# Patient Record
Sex: FEMALE | Race: WHITE | NOT HISPANIC OR LATINO | Employment: OTHER | URBAN - NONMETROPOLITAN AREA
[De-identification: names, ages, dates, MRNs, and addresses within clinical notes are randomized per-mention and may not be internally consistent; named-entity substitution may affect disease eponyms.]

---

## 2017-11-13 ENCOUNTER — OFFICE VISIT (OUTPATIENT)
Dept: FAMILY MEDICINE CLINIC | Facility: CLINIC | Age: 72
End: 2017-11-13

## 2017-11-13 VITALS
WEIGHT: 197 LBS | BODY MASS INDEX: 31.66 KG/M2 | RESPIRATION RATE: 16 BRPM | TEMPERATURE: 97.8 F | SYSTOLIC BLOOD PRESSURE: 118 MMHG | DIASTOLIC BLOOD PRESSURE: 57 MMHG | HEIGHT: 66 IN | HEART RATE: 71 BPM | OXYGEN SATURATION: 97 %

## 2017-11-13 DIAGNOSIS — M79.10 MYALGIA: ICD-10-CM

## 2017-11-13 DIAGNOSIS — G89.29 CHRONIC PAIN OF BOTH KNEES: ICD-10-CM

## 2017-11-13 DIAGNOSIS — M25.562 CHRONIC PAIN OF BOTH KNEES: ICD-10-CM

## 2017-11-13 DIAGNOSIS — F51.01 PRIMARY INSOMNIA: ICD-10-CM

## 2017-11-13 DIAGNOSIS — Z11.59 ENCOUNTER FOR HEPATITIS C SCREENING TEST FOR LOW RISK PATIENT: ICD-10-CM

## 2017-11-13 DIAGNOSIS — M54.42 CHRONIC BILATERAL LOW BACK PAIN WITH LEFT-SIDED SCIATICA: ICD-10-CM

## 2017-11-13 DIAGNOSIS — N39.0 URINARY TRACT INFECTION WITHOUT HEMATURIA, SITE UNSPECIFIED: ICD-10-CM

## 2017-11-13 DIAGNOSIS — M25.561 CHRONIC PAIN OF BOTH KNEES: ICD-10-CM

## 2017-11-13 DIAGNOSIS — R53.83 FATIGUE, UNSPECIFIED TYPE: ICD-10-CM

## 2017-11-13 DIAGNOSIS — G47.33 OSA (OBSTRUCTIVE SLEEP APNEA): Primary | ICD-10-CM

## 2017-11-13 DIAGNOSIS — L40.9 PSORIASIS: ICD-10-CM

## 2017-11-13 DIAGNOSIS — G89.29 CHRONIC BILATERAL LOW BACK PAIN WITH LEFT-SIDED SCIATICA: ICD-10-CM

## 2017-11-13 DIAGNOSIS — M19.90 ARTHRITIS: ICD-10-CM

## 2017-11-13 DIAGNOSIS — I10 ESSENTIAL HYPERTENSION: ICD-10-CM

## 2017-11-13 DIAGNOSIS — J43.8 OTHER EMPHYSEMA (HCC): ICD-10-CM

## 2017-11-13 DIAGNOSIS — E11.9 DIABETES MELLITUS WITHOUT COMPLICATION (HCC): ICD-10-CM

## 2017-11-13 PROCEDURE — 99204 OFFICE O/P NEW MOD 45 MIN: CPT | Performed by: INTERNAL MEDICINE

## 2017-11-13 RX ORDER — HYDROCODONE BITARTRATE AND ACETAMINOPHEN 10; 325 MG/1; MG/1
1 TABLET ORAL EVERY 6 HOURS PRN
COMMUNITY
End: 2017-11-13

## 2017-11-13 RX ORDER — ISOSORBIDE MONONITRATE 30 MG/1
30 TABLET, EXTENDED RELEASE ORAL DAILY
COMMUNITY
End: 2017-11-13 | Stop reason: SDUPTHER

## 2017-11-13 RX ORDER — VENLAFAXINE HYDROCHLORIDE 150 MG/1
150 CAPSULE, EXTENDED RELEASE ORAL 2 TIMES DAILY
COMMUNITY
End: 2017-11-13 | Stop reason: SDUPTHER

## 2017-11-13 RX ORDER — ISOSORBIDE MONONITRATE 30 MG/1
30 TABLET, EXTENDED RELEASE ORAL DAILY
Qty: 90 TABLET | Refills: 1 | Status: SHIPPED | OUTPATIENT
Start: 2017-11-13 | End: 2019-07-01

## 2017-11-13 RX ORDER — FENOFIBRATE 160 MG/1
160 TABLET ORAL DAILY
Qty: 90 TABLET | Refills: 1 | Status: SHIPPED | OUTPATIENT
Start: 2017-11-13 | End: 2018-07-30

## 2017-11-13 RX ORDER — METHOCARBAMOL 500 MG/1
1000 TABLET, FILM COATED ORAL 2 TIMES DAILY
COMMUNITY

## 2017-11-13 RX ORDER — RANITIDINE 150 MG/1
150 TABLET ORAL NIGHTLY
COMMUNITY
End: 2017-11-13 | Stop reason: SDUPTHER

## 2017-11-13 RX ORDER — METOPROLOL TARTRATE 50 MG/1
50 TABLET, FILM COATED ORAL 2 TIMES DAILY
Qty: 180 TABLET | Refills: 3 | Status: SHIPPED | OUTPATIENT
Start: 2017-11-13 | End: 2021-12-10 | Stop reason: DRUGHIGH

## 2017-11-13 RX ORDER — AMLODIPINE BESYLATE 10 MG/1
10 TABLET ORAL DAILY
Qty: 90 TABLET | Refills: 1 | Status: SHIPPED | OUTPATIENT
Start: 2017-11-13 | End: 2018-07-30

## 2017-11-13 RX ORDER — QUINAPRIL 40 MG/1
40 TABLET ORAL 2 TIMES DAILY
Qty: 180 TABLET | Refills: 1 | Status: SHIPPED | OUTPATIENT
Start: 2017-11-13 | End: 2018-07-30 | Stop reason: SINTOL

## 2017-11-13 RX ORDER — NITROFURANTOIN MACROCRYSTALS 100 MG/1
100 CAPSULE ORAL DAILY
COMMUNITY
End: 2017-11-13 | Stop reason: SDUPTHER

## 2017-11-13 RX ORDER — HYDROCODONE BITARTRATE AND ACETAMINOPHEN 5; 325 MG/1; MG/1
1 TABLET ORAL EVERY 6 HOURS PRN
COMMUNITY
End: 2017-11-13

## 2017-11-13 RX ORDER — LORAZEPAM 1 MG/1
2 TABLET ORAL 2 TIMES DAILY
COMMUNITY
End: 2020-12-23

## 2017-11-13 RX ORDER — FENOFIBRATE 160 MG/1
160 TABLET ORAL 4 TIMES DAILY
COMMUNITY
End: 2017-11-13 | Stop reason: SDUPTHER

## 2017-11-13 RX ORDER — NITROFURANTOIN MACROCRYSTALS 100 MG/1
100 CAPSULE ORAL DAILY
Qty: 30 CAPSULE | Refills: 5 | Status: SHIPPED | OUTPATIENT
Start: 2017-11-13 | End: 2020-12-24

## 2017-11-13 RX ORDER — METOPROLOL TARTRATE 50 MG/1
50 TABLET, FILM COATED ORAL 2 TIMES DAILY
COMMUNITY
End: 2017-11-13 | Stop reason: SDUPTHER

## 2017-11-13 RX ORDER — RANITIDINE 150 MG/1
150 TABLET ORAL 2 TIMES DAILY
Qty: 180 TABLET | Refills: 3 | Status: SHIPPED | OUTPATIENT
Start: 2017-11-13 | End: 2019-07-01

## 2017-11-13 RX ORDER — VENLAFAXINE HYDROCHLORIDE 150 MG/1
150 CAPSULE, EXTENDED RELEASE ORAL 2 TIMES DAILY
Qty: 180 CAPSULE | Refills: 3 | Status: SHIPPED | OUTPATIENT
Start: 2017-11-13 | End: 2018-12-20 | Stop reason: SDUPTHER

## 2017-11-13 RX ORDER — QUINAPRIL 40 MG/1
40 TABLET ORAL 2 TIMES DAILY
Qty: 60 TABLET | Refills: 11 | Status: SHIPPED | OUTPATIENT
Start: 2017-11-13 | End: 2017-11-13 | Stop reason: SDUPTHER

## 2017-11-13 RX ORDER — ZOLPIDEM TARTRATE 10 MG/1
10 TABLET ORAL NIGHTLY PRN
COMMUNITY
End: 2017-11-13

## 2017-11-13 RX ORDER — QUINAPRIL 40 MG/1
40 TABLET ORAL 2 TIMES DAILY
COMMUNITY
End: 2017-11-13 | Stop reason: SDUPTHER

## 2017-11-13 RX ORDER — CYCLOBENZAPRINE HCL 10 MG
10 TABLET ORAL 2 TIMES DAILY
COMMUNITY
End: 2019-07-01 | Stop reason: ALTCHOICE

## 2017-11-13 NOTE — PROGRESS NOTES
Subjective     Patient ID: Katharine Fitzpatrick is a 72 y.o. female. Patient is here for management of multiple medical problems.     Chief Complaint   Patient presents with   • Arthritis     Initial visit to establish care, patient states she is here for joint and back pain and headaches     History of Present Illness       meve her to be with family 1 month ago.. Pt was in [place].      C/o arthritis in knees and back.   Pt has bee on motrin and Celebrex.   Pt uses hydrocodone prn.    Pt not drinking water regularly.    \    Pt with poor sleep.  Pt sates she is no longer on Ambien.  Pt tried all the time. Wakes tired.           Smokes 1/2 ppd or less. Smoked 50 + year            The following portions of the patient's history were reviewed and updated as appropriate: allergies, current medications, past family history, past medical history, past social history, past surgical history and problem list.    Review of Systems   Constitutional: Positive for fatigue.   Respiratory: Negative for cough and shortness of breath.    Psychiatric/Behavioral: Positive for sleep disturbance. Negative for dysphoric mood.   All other systems reviewed and are negative.      Current Outpatient Prescriptions:   •  adalimumab (HUMIRA) 40 MG/0.8ML Prefilled Syringe Kit injection, Inject 40 mg under the skin 1 (One) Time., Disp: , Rfl:   •  amLODIPine (NORVASC) 10 MG tablet, Take 1 tablet by mouth Daily., Disp: 90 tablet, Rfl: 1  •  cyclobenzaprine (FLEXERIL) 10 MG tablet, Take 10 mg by mouth 2 (Two) Times a Day., Disp: , Rfl:   •  fenofibrate 160 MG tablet, Take 1 tablet by mouth Daily., Disp: 90 tablet, Rfl: 1  •  fluticasone-salmeterol (ADVAIR DISKUS) 250-50 MCG/DOSE DISKUS, Inhale 1 puff 2 (Two) Times a Day., Disp: 60 each, Rfl: 5  •  insulin aspart (novoLOG FLEXPEN) 100 UNIT/ML solution pen-injector sc pen, Inject  under the skin 3 (Three) Times a Day With Meals. Patient taking 25 units as needed, Disp: , Rfl:   •  Insulin Glargine (LANTUS  "SOLOSTAR) 100 UNIT/ML injection pen, Inject 38 Units under the skin 2 (Two) Times a Day., Disp: , Rfl:   •  isosorbide mononitrate (IMDUR) 30 MG 24 hr tablet, Take 1 tablet by mouth Daily., Disp: 90 tablet, Rfl: 1  •  LORazepam (ATIVAN) 1 MG tablet, Take 2 mg by mouth 2 (Two) Times a Day. Patient takes 2 tablets 2 times a day., Disp: , Rfl:   •  methocarbamol (ROBAXIN) 500 MG tablet, Take 1,000 mg by mouth 2 (Two) Times a Day. Patient takes 2 tablets 2 times a day., Disp: , Rfl:   •  metoprolol tartrate (LOPRESSOR) 50 MG tablet, Take 1 tablet by mouth 2 (Two) Times a Day., Disp: 180 tablet, Rfl: 3  •  nitrofurantoin (MACRODANTIN) 100 MG capsule, Take 1 capsule by mouth Daily., Disp: 30 capsule, Rfl: 5  •  quinapril (ACCUPRIL) 40 MG tablet, Take 1 tablet by mouth 2 (Two) Times a Day., Disp: 60 tablet, Rfl: 11  •  raNITIdine (ZANTAC) 150 MG tablet, Take 1 tablet by mouth 2 (Two) Times a Day., Disp: 180 tablet, Rfl: 3  •  venlafaxine XR (EFFEXOR-XR) 150 MG 24 hr capsule, Take 1 capsule by mouth 2 (Two) Times a Day., Disp: 180 capsule, Rfl: 3  •  Tdap (BOOSTRIX) 5-2.5-18.5 LF-MCG/0.5 injection, Inject 0.5 mL into the shoulder, thigh, or buttocks 1 (One) Time for 1 dose., Disp: 0.5 mL, Rfl: 0    Current Facility-Administered Medications:   •  pneumococcal polysaccharide 23-valent (PNEUMOVAX-23) vaccine 0.5 mL, 0.5 mL, Intramuscular, Once, Adam Glasgow MD    Objective      Blood pressure 118/57, pulse 71, temperature 97.8 °F (36.6 °C), temperature source Temporal Artery , resp. rate 16, height 66\" (167.6 cm), weight 197 lb (89.4 kg), SpO2 97 %.    Physical Exam     General Appearance:    Alert, cooperative, no distress, appears stated age   Head:    Normocephalic, without obvious abnormality, atraumatic   Eyes:    PERRL, conjunctiva/corneas clear, EOM's intact   Ears:    Normal TM's and external ear canals, both ears   Nose:   Nares normal, septum midline, mucosa normal, no drainage   or sinus tenderness   Throat:   " Dry tacky mm.    Neck:   Supple, symmetrical, trachea midline, no adenopathy;        thyroid:  No enlargement/tenderness/nodules; no carotid    bruit or JVD   Back:     Symmetric, no curvature, ROM normal, no CVA tenderness   Lungs:     Wheezing and prolonged bs to auscultation bilaterally, respirations unlabored   Chest wall:    No tenderness or deformity   Heart:    Regular rate and rhythm, S1 and S2 normal, no murmur,        rub or gallop   Abdomen:     Soft, non-tender, bowel sounds active all four quadrants,     no masses, no organomegaly   Extremities:   Extremities normal, atraumatic, no cyanosis or edema   Pulses:   2+ and symmetric all extremities   Skin:   Skin color, texture, turgor normal, no rashes or lesions   Lymph nodes:   Cervical, supraclavicular, and axillary nodes normal   Neurologic:   CNII-XII intact. Normal strength, sensation and reflexes       throughout      No results found for this or any previous visit.      Assessment/Plan   Pt needs to drink water and stop soda and juice.    Pt declined chantix.      Katharine was seen today for arthritis.    Diagnoses and all orders for this visit:    VERN (obstructive sleep apnea)  -     Ambulatory Referral to Rheumatology  -     Tdap (BOOSTRIX) 5-2.5-18.5 LF-MCG/0.5 injection; Inject 0.5 mL into the shoulder, thigh, or buttocks 1 (One) Time for 1 dose.  -     pneumococcal polysaccharide 23-valent (PNEUMOVAX-23) vaccine 0.5 mL; Inject 0.5 mL into the shoulder, thigh, or buttocks 1 (One) Time.  -     Hepatitis panel, acute; Future  -     Hemoglobin A1c  -     Lipid Panel  -     MicroAlbumin, Urine, Random - Urine, Clean Catch  -     Urinalysis With Microscopic - Urine, Clean Catch  -     Urine Culture - Urine, Urine, Clean Catch  -     CBC & Differential  -     Comprehensive Metabolic Panel  -     TSH  -     T4, Free  -     Vitamin B12  -     Ambulatory Referral to Neurology    Primary insomnia  -     Ambulatory Referral to Rheumatology  -     Tdap  (BOOSTRIX) 5-2.5-18.5 LF-MCG/0.5 injection; Inject 0.5 mL into the shoulder, thigh, or buttocks 1 (One) Time for 1 dose.  -     pneumococcal polysaccharide 23-valent (PNEUMOVAX-23) vaccine 0.5 mL; Inject 0.5 mL into the shoulder, thigh, or buttocks 1 (One) Time.  -     Hepatitis panel, acute; Future  -     Hemoglobin A1c  -     Lipid Panel  -     MicroAlbumin, Urine, Random - Urine, Clean Catch  -     Urinalysis With Microscopic - Urine, Clean Catch  -     Urine Culture - Urine, Urine, Clean Catch  -     CBC & Differential  -     Comprehensive Metabolic Panel  -     TSH  -     T4, Free  -     Vitamin B12    Psoriasis  -     Ambulatory Referral to Rheumatology  -     Tdap (BOOSTRIX) 5-2.5-18.5 LF-MCG/0.5 injection; Inject 0.5 mL into the shoulder, thigh, or buttocks 1 (One) Time for 1 dose.  -     pneumococcal polysaccharide 23-valent (PNEUMOVAX-23) vaccine 0.5 mL; Inject 0.5 mL into the shoulder, thigh, or buttocks 1 (One) Time.  -     Hepatitis panel, acute; Future  -     Hemoglobin A1c  -     Lipid Panel  -     MicroAlbumin, Urine, Random - Urine, Clean Catch  -     Urinalysis With Microscopic - Urine, Clean Catch  -     Urine Culture - Urine, Urine, Clean Catch  -     CBC & Differential  -     Comprehensive Metabolic Panel  -     TSH  -     T4, Free  -     Vitamin B12    Chronic bilateral low back pain with left-sided sciatica  -     Ambulatory Referral to Rheumatology  -     Tdap (BOOSTRIX) 5-2.5-18.5 LF-MCG/0.5 injection; Inject 0.5 mL into the shoulder, thigh, or buttocks 1 (One) Time for 1 dose.  -     pneumococcal polysaccharide 23-valent (PNEUMOVAX-23) vaccine 0.5 mL; Inject 0.5 mL into the shoulder, thigh, or buttocks 1 (One) Time.  -     Hepatitis panel, acute; Future  -     Hemoglobin A1c  -     Lipid Panel  -     MicroAlbumin, Urine, Random - Urine, Clean Catch  -     Urinalysis With Microscopic - Urine, Clean Catch  -     Urine Culture - Urine, Urine, Clean Catch  -     CBC & Differential  -      Comprehensive Metabolic Panel  -     TSH  -     T4, Free  -     Vitamin B12    Essential hypertension  -     Ambulatory Referral to Rheumatology  -     Tdap (BOOSTRIX) 5-2.5-18.5 LF-MCG/0.5 injection; Inject 0.5 mL into the shoulder, thigh, or buttocks 1 (One) Time for 1 dose.  -     pneumococcal polysaccharide 23-valent (PNEUMOVAX-23) vaccine 0.5 mL; Inject 0.5 mL into the shoulder, thigh, or buttocks 1 (One) Time.  -     Hepatitis panel, acute; Future  -     Hemoglobin A1c  -     Lipid Panel  -     MicroAlbumin, Urine, Random - Urine, Clean Catch  -     Urinalysis With Microscopic - Urine, Clean Catch  -     Urine Culture - Urine, Urine, Clean Catch  -     CBC & Differential  -     Comprehensive Metabolic Panel  -     TSH  -     T4, Free  -     Vitamin B12    Chronic pain of both knees  -     Ambulatory Referral to Rheumatology  -     Tdap (BOOSTRIX) 5-2.5-18.5 LF-MCG/0.5 injection; Inject 0.5 mL into the shoulder, thigh, or buttocks 1 (One) Time for 1 dose.  -     pneumococcal polysaccharide 23-valent (PNEUMOVAX-23) vaccine 0.5 mL; Inject 0.5 mL into the shoulder, thigh, or buttocks 1 (One) Time.  -     Hepatitis panel, acute; Future  -     Hemoglobin A1c  -     Lipid Panel  -     MicroAlbumin, Urine, Random - Urine, Clean Catch  -     Urinalysis With Microscopic - Urine, Clean Catch  -     Urine Culture - Urine, Urine, Clean Catch  -     CBC & Differential  -     Comprehensive Metabolic Panel  -     TSH  -     T4, Free  -     Vitamin B12    Other emphysema  -     Ambulatory Referral to Rheumatology  -     Tdap (BOOSTRIX) 5-2.5-18.5 LF-MCG/0.5 injection; Inject 0.5 mL into the shoulder, thigh, or buttocks 1 (One) Time for 1 dose.  -     pneumococcal polysaccharide 23-valent (PNEUMOVAX-23) vaccine 0.5 mL; Inject 0.5 mL into the shoulder, thigh, or buttocks 1 (One) Time.  -     Hepatitis panel, acute; Future  -     Hemoglobin A1c  -     Lipid Panel  -     MicroAlbumin, Urine, Random - Urine, Clean Catch  -      Urinalysis With Microscopic - Urine, Clean Catch  -     Urine Culture - Urine, Urine, Clean Catch  -     CBC & Differential  -     Comprehensive Metabolic Panel  -     TSH  -     T4, Free  -     Vitamin B12    Arthritis  -     Ambulatory Referral to Rheumatology  -     Tdap (BOOSTRIX) 5-2.5-18.5 LF-MCG/0.5 injection; Inject 0.5 mL into the shoulder, thigh, or buttocks 1 (One) Time for 1 dose.  -     pneumococcal polysaccharide 23-valent (PNEUMOVAX-23) vaccine 0.5 mL; Inject 0.5 mL into the shoulder, thigh, or buttocks 1 (One) Time.  -     Hepatitis panel, acute; Future  -     Hemoglobin A1c  -     Lipid Panel  -     MicroAlbumin, Urine, Random - Urine, Clean Catch  -     Urinalysis With Microscopic - Urine, Clean Catch  -     Urine Culture - Urine, Urine, Clean Catch  -     CBC & Differential  -     Comprehensive Metabolic Panel  -     TSH  -     T4, Free  -     Vitamin B12    Urinary tract infection without hematuria, site unspecified  -     Ambulatory Referral to Rheumatology  -     Tdap (BOOSTRIX) 5-2.5-18.5 LF-MCG/0.5 injection; Inject 0.5 mL into the shoulder, thigh, or buttocks 1 (One) Time for 1 dose.  -     pneumococcal polysaccharide 23-valent (PNEUMOVAX-23) vaccine 0.5 mL; Inject 0.5 mL into the shoulder, thigh, or buttocks 1 (One) Time.  -     Hepatitis panel, acute; Future  -     Hemoglobin A1c  -     Lipid Panel  -     MicroAlbumin, Urine, Random - Urine, Clean Catch  -     Urinalysis With Microscopic - Urine, Clean Catch  -     Urine Culture - Urine, Urine, Clean Catch  -     CBC & Differential  -     Comprehensive Metabolic Panel  -     TSH  -     T4, Free  -     Vitamin B12    Diabetes mellitus without complication  -     Ambulatory Referral to Rheumatology  -     Tdap (BOOSTRIX) 5-2.5-18.5 LF-MCG/0.5 injection; Inject 0.5 mL into the shoulder, thigh, or buttocks 1 (One) Time for 1 dose.  -     pneumococcal polysaccharide 23-valent (PNEUMOVAX-23) vaccine 0.5 mL; Inject 0.5 mL into the shoulder,  thigh, or buttocks 1 (One) Time.  -     Hepatitis panel, acute; Future  -     Hemoglobin A1c  -     Lipid Panel  -     MicroAlbumin, Urine, Random - Urine, Clean Catch  -     Urinalysis With Microscopic - Urine, Clean Catch  -     Urine Culture - Urine, Urine, Clean Catch  -     CBC & Differential  -     Comprehensive Metabolic Panel  -     TSH  -     T4, Free  -     Vitamin B12    Encounter for hepatitis C screening test for low risk patient  -     Ambulatory Referral to Rheumatology  -     Tdap (BOOSTRIX) 5-2.5-18.5 LF-MCG/0.5 injection; Inject 0.5 mL into the shoulder, thigh, or buttocks 1 (One) Time for 1 dose.  -     pneumococcal polysaccharide 23-valent (PNEUMOVAX-23) vaccine 0.5 mL; Inject 0.5 mL into the shoulder, thigh, or buttocks 1 (One) Time.  -     Hepatitis panel, acute; Future  -     Hemoglobin A1c  -     Lipid Panel  -     MicroAlbumin, Urine, Random - Urine, Clean Catch  -     Urinalysis With Microscopic - Urine, Clean Catch  -     Urine Culture - Urine, Urine, Clean Catch  -     CBC & Differential  -     Comprehensive Metabolic Panel  -     TSH  -     T4, Free  -     Vitamin B12    Fatigue, unspecified type  -     Ambulatory Referral to Rheumatology  -     Tdap (BOOSTRIX) 5-2.5-18.5 LF-MCG/0.5 injection; Inject 0.5 mL into the shoulder, thigh, or buttocks 1 (One) Time for 1 dose.  -     pneumococcal polysaccharide 23-valent (PNEUMOVAX-23) vaccine 0.5 mL; Inject 0.5 mL into the shoulder, thigh, or buttocks 1 (One) Time.  -     Hepatitis panel, acute; Future  -     Hemoglobin A1c  -     Lipid Panel  -     MicroAlbumin, Urine, Random - Urine, Clean Catch  -     Urinalysis With Microscopic - Urine, Clean Catch  -     Urine Culture - Urine, Urine, Clean Catch  -     CBC & Differential  -     Comprehensive Metabolic Panel  -     TSH  -     T4, Free  -     Vitamin B12    Myalgia  -     Myoglobin, Serum  -     CK Total & CKMB    Other orders  -     venlafaxine XR (EFFEXOR-XR) 150 MG 24 hr capsule; Take 1  capsule by mouth 2 (Two) Times a Day.  -     raNITIdine (ZANTAC) 150 MG tablet; Take 1 tablet by mouth 2 (Two) Times a Day.  -     quinapril (ACCUPRIL) 40 MG tablet; Take 1 tablet by mouth 2 (Two) Times a Day.  -     nitrofurantoin (MACRODANTIN) 100 MG capsule; Take 1 capsule by mouth Daily.  -     metoprolol tartrate (LOPRESSOR) 50 MG tablet; Take 1 tablet by mouth 2 (Two) Times a Day.  -     isosorbide mononitrate (IMDUR) 30 MG 24 hr tablet; Take 1 tablet by mouth Daily.  -     fluticasone-salmeterol (ADVAIR DISKUS) 250-50 MCG/DOSE DISKUS; Inhale 1 puff 2 (Two) Times a Day.  -     fenofibrate 160 MG tablet; Take 1 tablet by mouth Daily.  -     amLODIPine (NORVASC) 10 MG tablet; Take 1 tablet by mouth Daily.      No Follow-up on file.          Patient Instructions   D-mannose  1 pill 2 x a day.        Adam Glasgow MD    Assessment/Plan

## 2017-11-18 ENCOUNTER — RESULTS ENCOUNTER (OUTPATIENT)
Dept: FAMILY MEDICINE CLINIC | Facility: CLINIC | Age: 72
End: 2017-11-18

## 2017-11-18 DIAGNOSIS — I10 ESSENTIAL HYPERTENSION: ICD-10-CM

## 2017-11-18 DIAGNOSIS — L40.9 PSORIASIS: ICD-10-CM

## 2017-11-18 DIAGNOSIS — Z11.59 ENCOUNTER FOR HEPATITIS C SCREENING TEST FOR LOW RISK PATIENT: ICD-10-CM

## 2017-11-18 DIAGNOSIS — M25.562 CHRONIC PAIN OF BOTH KNEES: ICD-10-CM

## 2017-11-18 DIAGNOSIS — G89.29 CHRONIC BILATERAL LOW BACK PAIN WITH LEFT-SIDED SCIATICA: ICD-10-CM

## 2017-11-18 DIAGNOSIS — N39.0 URINARY TRACT INFECTION WITHOUT HEMATURIA, SITE UNSPECIFIED: ICD-10-CM

## 2017-11-18 DIAGNOSIS — R53.83 FATIGUE, UNSPECIFIED TYPE: ICD-10-CM

## 2017-11-18 DIAGNOSIS — M19.90 ARTHRITIS: ICD-10-CM

## 2017-11-18 DIAGNOSIS — G89.29 CHRONIC PAIN OF BOTH KNEES: ICD-10-CM

## 2017-11-18 DIAGNOSIS — F51.01 PRIMARY INSOMNIA: ICD-10-CM

## 2017-11-18 DIAGNOSIS — G47.33 OSA (OBSTRUCTIVE SLEEP APNEA): ICD-10-CM

## 2017-11-18 DIAGNOSIS — M54.42 CHRONIC BILATERAL LOW BACK PAIN WITH LEFT-SIDED SCIATICA: ICD-10-CM

## 2017-11-18 DIAGNOSIS — M25.561 CHRONIC PAIN OF BOTH KNEES: ICD-10-CM

## 2017-11-18 DIAGNOSIS — E11.9 DIABETES MELLITUS WITHOUT COMPLICATION (HCC): ICD-10-CM

## 2017-11-18 DIAGNOSIS — J43.8 OTHER EMPHYSEMA (HCC): ICD-10-CM

## 2018-01-25 ENCOUNTER — OFFICE VISIT (OUTPATIENT)
Dept: CARDIOLOGY | Facility: CLINIC | Age: 73
End: 2018-01-25

## 2018-01-25 VITALS
HEART RATE: 59 BPM | SYSTOLIC BLOOD PRESSURE: 110 MMHG | WEIGHT: 199 LBS | BODY MASS INDEX: 31.98 KG/M2 | DIASTOLIC BLOOD PRESSURE: 68 MMHG | HEIGHT: 66 IN

## 2018-01-25 DIAGNOSIS — R07.2 PRECORDIAL PAIN: Primary | ICD-10-CM

## 2018-01-25 DIAGNOSIS — Z79.4 TYPE 2 DIABETES MELLITUS WITHOUT COMPLICATION, WITH LONG-TERM CURRENT USE OF INSULIN (HCC): ICD-10-CM

## 2018-01-25 DIAGNOSIS — E11.9 TYPE 2 DIABETES MELLITUS WITHOUT COMPLICATION, WITH LONG-TERM CURRENT USE OF INSULIN (HCC): ICD-10-CM

## 2018-01-25 DIAGNOSIS — E78.5 DYSLIPIDEMIA: ICD-10-CM

## 2018-01-25 DIAGNOSIS — Z72.0 TOBACCO ABUSE: ICD-10-CM

## 2018-01-25 DIAGNOSIS — I10 ESSENTIAL HYPERTENSION: ICD-10-CM

## 2018-01-25 DIAGNOSIS — R01.1 MURMUR, CARDIAC: ICD-10-CM

## 2018-01-25 PROCEDURE — 99204 OFFICE O/P NEW MOD 45 MIN: CPT | Performed by: INTERNAL MEDICINE

## 2018-01-25 PROCEDURE — 93000 ELECTROCARDIOGRAM COMPLETE: CPT | Performed by: INTERNAL MEDICINE

## 2018-01-25 PROCEDURE — 99406 BEHAV CHNG SMOKING 3-10 MIN: CPT | Performed by: INTERNAL MEDICINE

## 2018-01-25 NOTE — PROGRESS NOTES
[facility]  Consultation H&P  Katharine Fitzpatrick  1945  [phone]    VISIT DATE:  01/25/2018    PCP: Adam Glasgow MD  [address] [place] [id number]    IDENTIFICATION: A 72 y.o. female, originally from [place], a retired sutrgical. RN. Moved to be closer to family    CC:  Chief Complaint   Patient presents with   • Shortness of Breath     New Patient    • Chest Pain       PROBLEM LIST:  1. CP  2. HTN  3. HLD, on crestor   1. 1/10/18 lipids:   HDL 45 LDL 87  4. DM II  1. Dx 2010, insulin since 2004  2. 1/10/18 A1c 7.3  5. Tobacco abuse  1. 1/2 PPD  6. RA  1. Sees Dr. Morgan, on humria  7. Depression/anxiety    Allergies  Allergies   Allergen Reactions   • Penicillins Rash       Current Medications    Current Outpatient Prescriptions:   •  adalimumab (HUMIRA) 40 MG/0.8ML Prefilled Syringe Kit injection, Inject 40 mg under the skin 1 (One) Time., Disp: , Rfl:   •  amLODIPine (NORVASC) 10 MG tablet, Take 1 tablet by mouth Daily., Disp: 90 tablet, Rfl: 1  •  cyclobenzaprine (FLEXERIL) 10 MG tablet, Take 10 mg by mouth 2 (Two) Times a Day., Disp: , Rfl:   •  fenofibrate 160 MG tablet, Take 1 tablet by mouth Daily., Disp: 90 tablet, Rfl: 1  •  fluticasone-salmeterol (ADVAIR DISKUS) 250-50 MCG/DOSE DISKUS, Inhale 1 puff 2 (Two) Times a Day., Disp: 60 each, Rfl: 5  •  insulin aspart (novoLOG FLEXPEN) 100 UNIT/ML solution pen-injector sc pen, Inject  under the skin 3 (Three) Times a Day With Meals. Patient taking 25 units as needed, Disp: , Rfl:   •  Insulin Glargine (LANTUS SOLOSTAR) 100 UNIT/ML injection pen, Inject 38 Units under the skin 2 (Two) Times a Day., Disp: , Rfl:   •  isosorbide mononitrate (IMDUR) 30 MG 24 hr tablet, Take 1 tablet by mouth Daily., Disp: 90 tablet, Rfl: 1  •  LORazepam (ATIVAN) 1 MG tablet, Take 2 mg by mouth 2 (Two) Times a Day. Patient takes 2 tablets 2 times a day., Disp: , Rfl:   •  methocarbamol (ROBAXIN) 500 MG tablet, Take 1,000  mg by mouth 2 (Two) Times a Day. Patient takes 2 tablets 2 times a day., Disp: , Rfl:   •  metoprolol tartrate (LOPRESSOR) 50 MG tablet, Take 1 tablet by mouth 2 (Two) Times a Day., Disp: 180 tablet, Rfl: 3  •  nitrofurantoin (MACRODANTIN) 100 MG capsule, Take 1 capsule by mouth Daily., Disp: 30 capsule, Rfl: 5  •  quinapril (ACCUPRIL) 40 MG tablet, Take 1 tablet by mouth 2 (Two) Times a Day., Disp: 180 tablet, Rfl: 1  •  raNITIdine (ZANTAC) 150 MG tablet, Take 1 tablet by mouth 2 (Two) Times a Day. (Patient taking differently: Take 150 mg by mouth Daily.), Disp: 180 tablet, Rfl: 3  •  venlafaxine XR (EFFEXOR-XR) 150 MG 24 hr capsule, Take 1 capsule by mouth 2 (Two) Times a Day., Disp: 180 capsule, Rfl: 3  •  ZOLPIDEM TARTRATE PO, Take  by mouth At Night As Needed., Disp: , Rfl:      History of Present Illness   HPI  This is a 72-year-old female with the above mentioned PMH who presents for consult from PCP for evaluation of chest pain.    Pt reports CP that occurs 1-2 times per week most weeks. She describes crushing pressure that has occurred randomly, usually at rest.  She endorses dyspnea on exertion with walking for longer periods doing activities like walking through the grocery store.  She has been diabetic for 8 years and on insulin for 5 years.  Her most recent A1c was 7.3.  She is treated for hyperlipidemia and hypertension with no recent changes.  She is a long-time smoker and has recently cut back to one half PPD. She had previously seen cardiology 15 years ago where she had a stress and echo, data deficient.    Pt denies any dyspnea at rest, orthopnea, PND, palpitations, lower extremity edema, or claudication. Pt denies history of CHF, DVT, PE, MI, CVA, TIA, or rheumatic fever.  She does have family history of early CAD.       ROS  Review of Systems   Constitution: Positive for malaise/fatigue.   Cardiovascular: Positive for chest pain and dyspnea on exertion.   Respiratory: Positive for shortness of  "breath.    Skin: Positive for dry skin.   Musculoskeletal: Positive for arthritis and joint pain.   Neurological: Positive for headaches.   All other systems reviewed and are negative.      SOCIAL HX  Social History     Social History   • Marital status:      Spouse name: N/A   • Number of children: N/A   • Years of education: N/A     Occupational History   • Not on file.     Social History Main Topics   • Smoking status: Current Every Day Smoker     Packs/day: 0.50   • Smokeless tobacco: Never Used   • Alcohol use No   • Drug use: No   • Sexual activity: Defer     Other Topics Concern   • Not on file     Social History Narrative       FAMILY HX  Family History   Problem Relation Age of Onset   • Cancer Mother    • Uterine cancer Mother    • Heart attack Father        Vitals:    01/25/18 1016   BP: 110/68   BP Location: Right arm   Patient Position: Sitting   Pulse: 59   Weight: 90.3 kg (199 lb)   Height: 167.6 cm (66\")       PHYSICAL EXAMINATION:  Physical Exam   Constitutional: She is oriented to person, place, and time. She appears well-developed and well-nourished. No distress.   obese   HENT:   Head: Normocephalic and atraumatic.   Right Ear: External ear normal.   Left Ear: External ear normal.   Nose: Nose normal.   Eyes: Conjunctivae and EOM are normal.   Neck: Neck supple. No hepatojugular reflux and no JVD present. Carotid bruit is not present. No thyromegaly present.   Cardiovascular: Normal rate, regular rhythm, S1 normal, S2 normal, intact distal pulses and normal pulses.  Exam reveals no gallop, no distant heart sounds and no midsystolic click.    Murmur heard.  Pulses:       Radial pulses are 2+ on the right side, and 2+ on the left side.        Dorsalis pedis pulses are 2+ on the right side, and 2+ on the left side.        Posterior tibial pulses are 2+ on the right side, and 2+ on the left side.   Pulmonary/Chest: Effort normal and breath sounds normal. No respiratory distress. She has no " decreased breath sounds. She has no wheezes. She has no rhonchi. She has no rales.   Abdominal: Soft. Bowel sounds are normal. There is no hepatosplenomegaly. There is no tenderness.   Musculoskeletal: Normal range of motion. She exhibits edema (trace).   Neurological: She is alert and oriented to person, place, and time.   No focal deficits.   Skin: Skin is warm and dry. No erythema.   Psychiatric: She has a normal mood and affect. Thought content normal.   Nursing note and vitals reviewed.      Diagnostic Data:    ECG 12 Lead  Date/Time: 1/25/2018 10:40 AM  Performed by: CESIA HERNANDEZ  Authorized by: CESIA HERNANDEZ   Rhythm: sinus bradycardia  BPM: 59  QRS axis: left  Comments: Poor r wave progression        ASSESSMENT:   Diagnosis Plan   1. Precordial pain  ECG 12 Lead    Stress Test With Myocardial Perfusion (1 Day)   2. Essential hypertension     3. Dyslipidemia     4. Type 2 diabetes mellitus without complication, with long-term current use of insulin     5. Tobacco abuse     6. Murmur, cardiac  Adult Transthoracic Echo Complete W/ Cont if Necessary Per Protocol     PLAN:  1. With atypical CP in a 72 year old female diabetic, we will treat as an anginal equivalent and risk stratify with noninvasive ischemic eval  2. Controlled, continue antihypertensives  3. Controlled, continue statin therapy with crestor  4. DM II modestly controlled on insulin only. Pt counseled that cardiovascular risk increases with a1cs >7.0.   5. Pt counseled for 5 mn on the importance of smoking cessation and methods to aid in cessation. Pt desires to quit this year and would like to try it on her own.   6. Heart murmur heard. Will evaluate with echocardiogram on day of stress as well.     Scribed for Cesia Hernandez MD by Geraldine Lou PA-C. 1/25/2018  10:53 AM  ICesia MD, personally performed the services described in this documentation as scribed by the above named individual in my presence, and it is both  accurate and complete.  1/25/2018  12:45 PM    Joe Hernandez MD, FACC

## 2018-01-30 ENCOUNTER — TELEPHONE (OUTPATIENT)
Dept: CARDIOLOGY | Facility: CLINIC | Age: 73
End: 2018-01-30

## 2018-01-30 NOTE — TELEPHONE ENCOUNTER
Patient called and stated that she has no idea what is going on with her heart as she was not told a thing and she needs another appt. Advised patient that at her appt we ordered some testing to help determine what is going on with her heart and they will be setting this up soon. Patients said she was not told this. Informed her that Im sure she was giving lots of information and that's why Im reviewing with her. Patient verbalized understanding.

## 2018-02-07 ENCOUNTER — HOSPITAL ENCOUNTER (OUTPATIENT)
Dept: CARDIOLOGY | Facility: HOSPITAL | Age: 73
Discharge: HOME | End: 2018-02-07
Admitting: PHYSICIAN ASSISTANT

## 2018-02-07 ENCOUNTER — HOSPITAL ENCOUNTER (OUTPATIENT)
Dept: CARDIOLOGY | Facility: HOSPITAL | Age: 73
Discharge: HOME | End: 2018-02-07

## 2018-02-07 DIAGNOSIS — R07.2 PRECORDIAL PAIN: ICD-10-CM

## 2018-02-07 LAB
BH CV ECHO MEAS - AO MAX PG (FULL): 13.8 MMHG
BH CV ECHO MEAS - AO MAX PG: 18.8 MMHG
BH CV ECHO MEAS - AO MEAN PG (FULL): 6 MMHG
BH CV ECHO MEAS - AO MEAN PG: 9 MMHG
BH CV ECHO MEAS - AO ROOT AREA (BSA CORRECTED): 1.3
BH CV ECHO MEAS - AO ROOT AREA: 5.3 CM^2
BH CV ECHO MEAS - AO ROOT DIAM: 2.6 CM
BH CV ECHO MEAS - AO V2 MAX: 217 CM/SEC
BH CV ECHO MEAS - AO V2 MEAN: 136 CM/SEC
BH CV ECHO MEAS - AO V2 VTI: 53.3 CM
BH CV ECHO MEAS - AVA(I,A): 2 CM^2
BH CV ECHO MEAS - AVA(I,D): 2 CM^2
BH CV ECHO MEAS - AVA(V,A): 1.6 CM^2
BH CV ECHO MEAS - AVA(V,D): 1.6 CM^2
BH CV ECHO MEAS - BSA(HAYCOCK): 2.1 M^2
BH CV ECHO MEAS - BSA: 2 M^2
BH CV ECHO MEAS - BZI_BMI: 32.1 KILOGRAMS/M^2
BH CV ECHO MEAS - BZI_METRIC_HEIGHT: 167.6 CM
BH CV ECHO MEAS - BZI_METRIC_WEIGHT: 90.3 KG
BH CV ECHO MEAS - CONTRAST EF (2CH): 79.2 ML/M^2
BH CV ECHO MEAS - CONTRAST EF 4CH: 86.1 ML/M^2
BH CV ECHO MEAS - EDV(CUBED): 88.7 ML
BH CV ECHO MEAS - EDV(MOD-SP2): 53 ML
BH CV ECHO MEAS - EDV(MOD-SP4): 72 ML
BH CV ECHO MEAS - EDV(TEICH): 90.5 ML
BH CV ECHO MEAS - EF(CUBED): 89.7 %
BH CV ECHO MEAS - EF(MOD-SP2): 79.2 %
BH CV ECHO MEAS - EF(MOD-SP4): 86.1 %
BH CV ECHO MEAS - EF(TEICH): 84.3 %
BH CV ECHO MEAS - ESV(CUBED): 9.1 ML
BH CV ECHO MEAS - ESV(MOD-SP2): 11 ML
BH CV ECHO MEAS - ESV(MOD-SP4): 10 ML
BH CV ECHO MEAS - ESV(TEICH): 14.2 ML
BH CV ECHO MEAS - FS: 53.1 %
BH CV ECHO MEAS - IVS/LVPW: 1.2
BH CV ECHO MEAS - IVSD: 1.3 CM
BH CV ECHO MEAS - LA DIMENSION: 3.8 CM
BH CV ECHO MEAS - LA/AO: 1.5
BH CV ECHO MEAS - LV DIASTOLIC VOL/BSA (35-75): 36.1 ML/M^2
BH CV ECHO MEAS - LV MASS(C)D: 195.4 GRAMS
BH CV ECHO MEAS - LV MASS(C)DI: 97.9 GRAMS/M^2
BH CV ECHO MEAS - LV MAX PG: 5 MMHG
BH CV ECHO MEAS - LV MEAN PG: 3 MMHG
BH CV ECHO MEAS - LV SYSTOLIC VOL/BSA (12-30): 5 ML/M^2
BH CV ECHO MEAS - LV V1 MAX: 112 CM/SEC
BH CV ECHO MEAS - LV V1 MEAN: 76.1 CM/SEC
BH CV ECHO MEAS - LV V1 VTI: 34.6 CM
BH CV ECHO MEAS - LVIDD: 4.5 CM
BH CV ECHO MEAS - LVIDS: 2.1 CM
BH CV ECHO MEAS - LVLD AP2: 5.8 CM
BH CV ECHO MEAS - LVLD AP4: 6.4 CM
BH CV ECHO MEAS - LVLS AP2: 4.5 CM
BH CV ECHO MEAS - LVLS AP4: 4.4 CM
BH CV ECHO MEAS - LVOT AREA (M): 3.1 CM^2
BH CV ECHO MEAS - LVOT AREA: 3.1 CM^2
BH CV ECHO MEAS - LVOT DIAM: 2 CM
BH CV ECHO MEAS - LVPWD: 1.1 CM
BH CV ECHO MEAS - MV A MAX VEL: 128 CM/SEC
BH CV ECHO MEAS - MV E MAX VEL: 113 CM/SEC
BH CV ECHO MEAS - MV E/A: 0.88
BH CV ECHO MEAS - RAP SYSTOLE: 8 MMHG
BH CV ECHO MEAS - RVDD: 3.1 CM
BH CV ECHO MEAS - RVSP: 18 MMHG
BH CV ECHO MEAS - SI(AO): 141.8 ML/M^2
BH CV ECHO MEAS - SI(CUBED): 39.9 ML/M^2
BH CV ECHO MEAS - SI(LVOT): 54.5 ML/M^2
BH CV ECHO MEAS - SI(MOD-SP2): 21 ML/M^2
BH CV ECHO MEAS - SI(MOD-SP4): 31.1 ML/M^2
BH CV ECHO MEAS - SI(TEICH): 38.2 ML/M^2
BH CV ECHO MEAS - SV(AO): 283 ML
BH CV ECHO MEAS - SV(CUBED): 79.6 ML
BH CV ECHO MEAS - SV(LVOT): 108.7 ML
BH CV ECHO MEAS - SV(MOD-SP2): 42 ML
BH CV ECHO MEAS - SV(MOD-SP4): 62 ML
BH CV ECHO MEAS - SV(TEICH): 76.3 ML
BH CV ECHO MEAS - TR MAX VEL: 157 CM/SEC
BH CV NUCLEAR PRIOR STUDY: 2
BH CV STRESS BP STAGE 1: NORMAL
BH CV STRESS BP STAGE 2: NORMAL
BH CV STRESS BP STAGE 3: NORMAL
BH CV STRESS COMMENTS STAGE 1: NORMAL
BH CV STRESS COMMENTS STAGE 2: NORMAL
BH CV STRESS DOSE REGADENOSON STAGE 1: 0.4
BH CV STRESS DURATION MIN STAGE 1: 1
BH CV STRESS DURATION MIN STAGE 2: 3
BH CV STRESS DURATION MIN STAGE 3: 2
BH CV STRESS DURATION SEC STAGE 1: 0
BH CV STRESS DURATION SEC STAGE 2: 0
BH CV STRESS DURATION SEC STAGE 3: 0
BH CV STRESS HR STAGE 1: 60
BH CV STRESS HR STAGE 2: 72
BH CV STRESS HR STAGE 3: 76
BH CV STRESS PROTOCOL 1: NORMAL
BH CV STRESS RECOVERY BP: NORMAL MMHG
BH CV STRESS RECOVERY HR: 71 BPM
BH CV STRESS STAGE 1: 1
BH CV STRESS STAGE 2: 2
BH CV STRESS STAGE 3: 3
BH CV XLRA - RV BASE: 3.6 CM
BH CV XLRA - RV LENGTH: 5.4 CM
BH CV XLRA - RV MID: 2.5 CM
LV EF 2D ECHO EST: 85 %
LV EF NUC BP: 75 %
MAXIMAL PREDICTED HEART RATE: 148 BPM
PERCENT MAX PREDICTED HR: 55.41 %
STRESS BASELINE BP: NORMAL MMHG
STRESS BASELINE HR: 56 BPM
STRESS PERCENT HR: 65 %
STRESS POST PEAK BP: NORMAL MMHG
STRESS POST PEAK HR: 82 BPM
STRESS TARGET HR: 126 BPM

## 2018-02-07 PROCEDURE — 93018 CV STRESS TEST I&R ONLY: CPT | Performed by: INTERNAL MEDICINE

## 2018-02-07 PROCEDURE — 25010000002 SULFUR HEXAFLUORIDE MICROSPH 60.7-25 MG RECONSTITUTED SUSPENSION: Performed by: INTERNAL MEDICINE

## 2018-02-07 PROCEDURE — 78452 HT MUSCLE IMAGE SPECT MULT: CPT | Performed by: INTERNAL MEDICINE

## 2018-02-07 PROCEDURE — 25010000002 REGADENOSON 0.4 MG/5ML SOLUTION: Performed by: INTERNAL MEDICINE

## 2018-02-07 PROCEDURE — A9500 TC99M SESTAMIBI: HCPCS | Performed by: INTERNAL MEDICINE

## 2018-02-07 PROCEDURE — 93306 TTE W/DOPPLER COMPLETE: CPT

## 2018-02-07 PROCEDURE — 93306 TTE W/DOPPLER COMPLETE: CPT | Performed by: INTERNAL MEDICINE

## 2018-02-07 PROCEDURE — 93017 CV STRESS TEST TRACING ONLY: CPT

## 2018-02-07 PROCEDURE — 0 TECHNETIUM SESTAMIBI: Performed by: INTERNAL MEDICINE

## 2018-02-07 PROCEDURE — 78452 HT MUSCLE IMAGE SPECT MULT: CPT

## 2018-02-07 RX ADMIN — SULFUR HEXAFLUORIDE 3 ML: KIT at 11:45

## 2018-02-07 RX ADMIN — TECHNETIUM TC 99M SESTAMIBI 1 DOSE: 1 INJECTION INTRAVENOUS at 08:25

## 2018-02-07 RX ADMIN — TECHNETIUM TC 99M SESTAMIBI 1 DOSE: 1 INJECTION INTRAVENOUS at 10:15

## 2018-02-07 RX ADMIN — REGADENOSON 0.4 MG: 0.08 INJECTION, SOLUTION INTRAVENOUS at 10:20

## 2018-02-16 ENCOUNTER — TELEPHONE (OUTPATIENT)
Dept: CARDIOLOGY | Facility: CLINIC | Age: 73
End: 2018-02-16

## 2018-02-16 NOTE — TELEPHONE ENCOUNTER
Called patient and left VM that she did have some hardening of the aortic valve without leaking or blocking flow. This is something we will just watch. Everything else looked normal and follow up as scheduled.

## 2018-02-16 NOTE — TELEPHONE ENCOUNTER
She has some hardening of the aortic valve without it leaking or blocking flow, which is fine. We will watch it over time. Everything else is normal.

## 2018-02-16 NOTE — TELEPHONE ENCOUNTER
Patient called about echo results. Please advise what to tell patient as she continues to state she has no idea what is going on with her heart.

## 2018-02-26 ENCOUNTER — TRANSCRIBE ORDERS (OUTPATIENT)
Dept: ULTRASOUND IMAGING | Facility: HOSPITAL | Age: 73
End: 2018-02-26

## 2018-02-26 DIAGNOSIS — I73.9 PAD (PERIPHERAL ARTERY DISEASE) (HCC): Primary | ICD-10-CM

## 2018-06-19 ENCOUNTER — OFFICE VISIT (OUTPATIENT)
Dept: SURGERY | Facility: CLINIC | Age: 73
End: 2018-06-19

## 2018-06-19 VITALS
RESPIRATION RATE: 18 BRPM | OXYGEN SATURATION: 92 % | HEART RATE: 88 BPM | TEMPERATURE: 98.4 F | HEIGHT: 67 IN | SYSTOLIC BLOOD PRESSURE: 118 MMHG | WEIGHT: 204 LBS | DIASTOLIC BLOOD PRESSURE: 66 MMHG | BODY MASS INDEX: 32.02 KG/M2

## 2018-06-19 DIAGNOSIS — L98.9 SKIN LESION: Primary | ICD-10-CM

## 2018-06-19 PROCEDURE — 99202 OFFICE O/P NEW SF 15 MIN: CPT | Performed by: SURGERY

## 2018-06-19 NOTE — PROGRESS NOTES
Patient: Katharine Fitzpatrick    YOB: 1945    Date: 06/19/2018    Primary Care Provider: Murtaza Elder MD    Chief Complaint   Patient presents with   • Cyst     on forehead.       Subjective .     History of present illness: Patient with mid forehead skin lesion.  Has been there for years.  Increasing in size and causing her pain.  No drainage.    The following portions of the patient's history were reviewed and updated as appropriate: allergies, current medications, past family history, past medical history, past social history, past surgical history and problem list.      Review of Systems   Constitutional: Negative for chills, fever and unexpected weight change.   HENT: Negative for hearing loss, trouble swallowing and voice change.    Eyes: Negative for visual disturbance.   Respiratory: Negative for apnea, cough, chest tightness, shortness of breath and wheezing.    Cardiovascular: Negative for chest pain, palpitations and leg swelling.   Gastrointestinal: Negative for abdominal distention, abdominal pain, anal bleeding, blood in stool, constipation, diarrhea, nausea, rectal pain and vomiting.   Endocrine: Negative for cold intolerance and heat intolerance.   Genitourinary: Negative for difficulty urinating, dysuria and flank pain.   Musculoskeletal: Negative for back pain and gait problem.   Skin: Positive for color change. Negative for rash and wound.   Neurological: Negative for dizziness, syncope, speech difficulty, weakness, light-headedness, numbness and headaches.   Hematological: Negative for adenopathy. Does not bruise/bleed easily.   Psychiatric/Behavioral: Negative for confusion. The patient is not nervous/anxious.        Allergies:  Allergies   Allergen Reactions   • Penicillins Rash       Medications:    Current Outpatient Prescriptions:   •  adalimumab (HUMIRA) 40 MG/0.8ML Prefilled Syringe Kit injection, Inject 40 mg under the skin 1 (One) Time., Disp: , Rfl:   •  amLODIPine  "(NORVASC) 10 MG tablet, Take 1 tablet by mouth Daily., Disp: 90 tablet, Rfl: 1  •  cyclobenzaprine (FLEXERIL) 10 MG tablet, Take 10 mg by mouth 2 (Two) Times a Day., Disp: , Rfl:   •  fenofibrate 160 MG tablet, Take 1 tablet by mouth Daily., Disp: 90 tablet, Rfl: 1  •  fluticasone-salmeterol (ADVAIR DISKUS) 250-50 MCG/DOSE DISKUS, Inhale 1 puff 2 (Two) Times a Day., Disp: 60 each, Rfl: 5  •  insulin aspart (novoLOG FLEXPEN) 100 UNIT/ML solution pen-injector sc pen, Inject  under the skin 3 (Three) Times a Day With Meals. Patient taking 25 units as needed, Disp: , Rfl:   •  Insulin Glargine (LANTUS SOLOSTAR) 100 UNIT/ML injection pen, Inject 38 Units under the skin 2 (Two) Times a Day., Disp: , Rfl:   •  isosorbide mononitrate (IMDUR) 30 MG 24 hr tablet, Take 1 tablet by mouth Daily., Disp: 90 tablet, Rfl: 1  •  LORazepam (ATIVAN) 1 MG tablet, Take 2 mg by mouth 2 (Two) Times a Day. Patient takes 2 tablets 2 times a day., Disp: , Rfl:   •  methocarbamol (ROBAXIN) 500 MG tablet, Take 1,000 mg by mouth 2 (Two) Times a Day. Patient takes 2 tablets 2 times a day., Disp: , Rfl:   •  metoprolol tartrate (LOPRESSOR) 50 MG tablet, Take 1 tablet by mouth 2 (Two) Times a Day., Disp: 180 tablet, Rfl: 3  •  nitrofurantoin (MACRODANTIN) 100 MG capsule, Take 1 capsule by mouth Daily., Disp: 30 capsule, Rfl: 5  •  quinapril (ACCUPRIL) 40 MG tablet, Take 1 tablet by mouth 2 (Two) Times a Day., Disp: 180 tablet, Rfl: 1  •  raNITIdine (ZANTAC) 150 MG tablet, Take 1 tablet by mouth 2 (Two) Times a Day. (Patient taking differently: Take 150 mg by mouth Daily.), Disp: 180 tablet, Rfl: 3  •  venlafaxine XR (EFFEXOR-XR) 150 MG 24 hr capsule, Take 1 capsule by mouth 2 (Two) Times a Day., Disp: 180 capsule, Rfl: 3    History\"  Past Medical History:   Diagnosis Date   • Anxiety    • Arthritis    • Colitis    • Colon polyp    • Depression    • Diabetes mellitus    • Headache    • Heart murmur    • Hyperlipidemia    • Hypertension    • Peptic " "ulceration    • Urinary tract infection        Past Surgical History:   Procedure Laterality Date   • CHOLECYSTECTOMY  2010   • HYSTERECTOMY  2007   • SALPINGO OOPHORECTOMY     • SPINAL FUSION  2002       Family History   Problem Relation Age of Onset   • Cancer Mother    • Uterine cancer Mother    • Heart attack Father        Social History   Substance Use Topics   • Smoking status: Current Every Day Smoker     Packs/day: 0.50   • Smokeless tobacco: Never Used   • Alcohol use No        Objective     Vital Signs:   Vitals:    06/19/18 1107   BP: 118/66   Pulse: 88   Resp: 18   Temp: 98.4 °F (36.9 °C)   TempSrc: Temporal Artery    SpO2: 92%   Weight: 92.5 kg (204 lb)   Height: 168.9 cm (66.5\")       Physical Exam:   General Appearance:    Alert, cooperative, in no acute distress   Skin:  1cm midforehead had firm lesion of uncertain etiology.       Results Review:   None    Assessment/Plan     1. Skin lesion        Painful lesion of the midforehead of uncertain etiology.  Perhaps a sebaceous cyst.  It is increasing in size and causing her pain.  I offered to remove this for her.  She understands the procedure as well as the risk of bleeding and infection and she wishes to proceed.    Electronically signed by Viktor Fulton MD  06/19/18      .  Portions of this note have been scribed for Viktor Fulton MD by Haylie Bajwa. 6/19/2018  11:27 AM            "

## 2018-07-05 ENCOUNTER — PROCEDURE VISIT (OUTPATIENT)
Dept: SURGERY | Facility: CLINIC | Age: 73
End: 2018-07-05

## 2018-07-05 VITALS
DIASTOLIC BLOOD PRESSURE: 72 MMHG | OXYGEN SATURATION: 95 % | TEMPERATURE: 97.7 F | HEIGHT: 66 IN | WEIGHT: 203.93 LBS | SYSTOLIC BLOOD PRESSURE: 130 MMHG | BODY MASS INDEX: 32.77 KG/M2 | HEART RATE: 65 BPM

## 2018-07-05 DIAGNOSIS — D17.30 SUBCUTANEOUS LIPOMA: Primary | ICD-10-CM

## 2018-07-05 DIAGNOSIS — L98.9 DISORDER OF SKIN: Primary | ICD-10-CM

## 2018-07-05 PROCEDURE — 21011 EXC FACE LES SC <2 CM: CPT | Performed by: SURGERY

## 2018-07-05 NOTE — PROGRESS NOTES
Procedure: Excision  Location: forehead    We have planned for excision of this.  She understood the risks of bleeding and infection and wishes to proceed.     The patient was brought to the procedure room. Consent and time out were performed. The area was prepped and draped in the usual fashion. 1% lidocaine with epinephrine was infused locally. An ellyptical incision was made around the lesion. Full thickness excision was performed.  This was in the subcutaneous location.  The lesion size was 8 mm. The wound was closed with simple interrupted nylon closure.  There was no significant blood loss and hemostasis had been well controlled.  Follow-up in a week for suture removal.  This did appear to be a possible lipoma.

## 2018-07-13 ENCOUNTER — CLINICAL SUPPORT (OUTPATIENT)
Dept: SURGERY | Facility: CLINIC | Age: 73
End: 2018-07-13

## 2018-07-13 VITALS
DIASTOLIC BLOOD PRESSURE: 84 MMHG | WEIGHT: 203 LBS | RESPIRATION RATE: 18 BRPM | HEIGHT: 67 IN | TEMPERATURE: 98.4 F | OXYGEN SATURATION: 98 % | SYSTOLIC BLOOD PRESSURE: 126 MMHG | HEART RATE: 76 BPM | BODY MASS INDEX: 31.86 KG/M2

## 2018-07-13 DIAGNOSIS — Z09 FOLLOW UP: Primary | ICD-10-CM

## 2018-07-13 PROCEDURE — 99024 POSTOP FOLLOW-UP VISIT: CPT | Performed by: SURGERY

## 2018-07-13 NOTE — PROGRESS NOTES
Patient comes in today to have suture removal from a recent excision of skin lesion on the forehead which was done in the office by Dr. Fulton.  Pathology report did show nodular basal cell carcinoma with tumor @ edge and deep margins. The sutures were removed and there was no redness or drainage from the sight.   Pt was given a follow-up appointment to see Dr. Fulton in the office for evaluation for residual basal cell carcinoma. The patient has no complaints.

## 2018-07-17 NOTE — PROGRESS NOTES
I do want to see the patient in follow-up.  You can let her know that this was a small cancer and we may have to re-excise to make sure we got all of it.

## 2018-07-19 ENCOUNTER — OFFICE VISIT (OUTPATIENT)
Dept: SURGERY | Facility: CLINIC | Age: 73
End: 2018-07-19

## 2018-07-19 VITALS
HEART RATE: 92 BPM | OXYGEN SATURATION: 95 % | DIASTOLIC BLOOD PRESSURE: 70 MMHG | WEIGHT: 204 LBS | TEMPERATURE: 98.2 F | HEIGHT: 66 IN | BODY MASS INDEX: 32.78 KG/M2 | SYSTOLIC BLOOD PRESSURE: 140 MMHG

## 2018-07-19 DIAGNOSIS — C44.319 BASAL CELL CARCINOMA OF SKIN OF OTHER PART OF FACE: Primary | ICD-10-CM

## 2018-07-19 DIAGNOSIS — C44.319 BASAL CELL CARCINOMA OF FOREHEAD: Primary | ICD-10-CM

## 2018-07-19 PROCEDURE — 12051 INTMD RPR FACE/MM 2.5 CM/<: CPT | Performed by: SURGERY

## 2018-07-19 PROCEDURE — 11641 EXC F/E/E/N/L MAL+MRG 0.6-1: CPT | Performed by: SURGERY

## 2018-07-19 NOTE — PROGRESS NOTES
"Patient: Katharine Fitzpatrick    YOB: 1945    Date: 07/19/2018    Primary Care Provider: Murtaza Elder MD    Chief Complaint   Patient presents with   • Skin Lesion       History: I saw the patient in the office today for an evaluation following her recent lesion excision performed on 07/06/18. The pathology did show basal cell carcinoma, tumor involves the edge and deep margins. She states she is doing well and has no complaints.     The following portions of the patient's history were reviewed and updated as appropriate: allergies, current medications, past family history, past medical history, past social history, past surgical history and problem list.      Review of Systems   Constitutional: Negative for chills, fatigue and fever.   Respiratory: Negative for cough.    Cardiovascular: Negative for chest pain.   Gastrointestinal: Negative for abdominal pain, diarrhea, nausea and vomiting.       Vital Signs  Vitals:    07/19/18 1346   BP: 140/70   Pulse: 92   Temp: 98.2 °F (36.8 °C)   SpO2: 95%   Weight: 92.5 kg (204 lb)   Height: 168.9 cm (66.5\")       Allergies:  Allergies   Allergen Reactions   • Penicillins Rash       Medications:    Current Outpatient Prescriptions:   •  adalimumab (HUMIRA) 40 MG/0.8ML Prefilled Syringe Kit injection, Inject 40 mg under the skin 1 (One) Time., Disp: , Rfl:   •  amLODIPine (NORVASC) 10 MG tablet, Take 1 tablet by mouth Daily., Disp: 90 tablet, Rfl: 1  •  cyclobenzaprine (FLEXERIL) 10 MG tablet, Take 10 mg by mouth 2 (Two) Times a Day., Disp: , Rfl:   •  fenofibrate 160 MG tablet, Take 1 tablet by mouth Daily., Disp: 90 tablet, Rfl: 1  •  fluticasone-salmeterol (ADVAIR DISKUS) 250-50 MCG/DOSE DISKUS, Inhale 1 puff 2 (Two) Times a Day., Disp: 60 each, Rfl: 5  •  insulin aspart (novoLOG FLEXPEN) 100 UNIT/ML solution pen-injector sc pen, Inject  under the skin 3 (Three) Times a Day With Meals. Patient taking 25 units as needed, Disp: , Rfl:   •  Insulin Glargine " (LANTUS SOLOSTAR) 100 UNIT/ML injection pen, Inject 38 Units under the skin 2 (Two) Times a Day., Disp: , Rfl:   •  isosorbide mononitrate (IMDUR) 30 MG 24 hr tablet, Take 1 tablet by mouth Daily., Disp: 90 tablet, Rfl: 1  •  LORazepam (ATIVAN) 1 MG tablet, Take 2 mg by mouth 2 (Two) Times a Day. Patient takes 2 tablets 2 times a day., Disp: , Rfl:   •  methocarbamol (ROBAXIN) 500 MG tablet, Take 1,000 mg by mouth 2 (Two) Times a Day. Patient takes 2 tablets 2 times a day., Disp: , Rfl:   •  metoprolol tartrate (LOPRESSOR) 50 MG tablet, Take 1 tablet by mouth 2 (Two) Times a Day., Disp: 180 tablet, Rfl: 3  •  nitrofurantoin (MACRODANTIN) 100 MG capsule, Take 1 capsule by mouth Daily., Disp: 30 capsule, Rfl: 5  •  quinapril (ACCUPRIL) 40 MG tablet, Take 1 tablet by mouth 2 (Two) Times a Day., Disp: 180 tablet, Rfl: 1  •  raNITIdine (ZANTAC) 150 MG tablet, Take 1 tablet by mouth 2 (Two) Times a Day. (Patient taking differently: Take 150 mg by mouth Daily.), Disp: 180 tablet, Rfl: 3  •  venlafaxine XR (EFFEXOR-XR) 150 MG 24 hr capsule, Take 1 capsule by mouth 2 (Two) Times a Day., Disp: 180 capsule, Rfl: 3    Physical Exam:   General Appearance:    Alert, cooperative, in no acute distress     Results Review:   None     Assessment/Plan     1. Basal cell carcinoma of skin of other part of face      I recommend reexcision of the forehead.  She understands procedure as well as the reason for the procedure.  Also understands the risk of bleeding and infection.  1% lidocaine was used locally after the area was prepped in the usual fashion.  An elliptical incision was made around the previous excision site and full-thickness excision down to fascia was performed. 1 cm in size.  Additional margins were also performed circumferentially and deep.  Wound was closed with interrupted deep dermal Vicryl suture and then the skin was closed with interrupted simple nylon closure.  There were scant blood loss.  Patient tolerated  procedure well.  There were no complications.  Electronically signed by Viktor Fulton MD  07/19/18    Portions of this note has been scribed for Viktor Fulton MD by Daylin Arreaga. 7/19/2018  3:00 PM

## 2018-07-27 ENCOUNTER — CLINICAL SUPPORT (OUTPATIENT)
Dept: SURGERY | Facility: CLINIC | Age: 73
End: 2018-07-27

## 2018-07-27 VITALS
RESPIRATION RATE: 18 BRPM | WEIGHT: 204 LBS | SYSTOLIC BLOOD PRESSURE: 138 MMHG | BODY MASS INDEX: 32.02 KG/M2 | DIASTOLIC BLOOD PRESSURE: 80 MMHG | OXYGEN SATURATION: 98 % | TEMPERATURE: 98 F | HEIGHT: 67 IN | HEART RATE: 68 BPM

## 2018-07-27 DIAGNOSIS — Z09 S/P EXCISION OF SKIN LESION, FOLLOW-UP EXAM: Primary | ICD-10-CM

## 2018-07-27 PROCEDURE — 99024 POSTOP FOLLOW-UP VISIT: CPT | Performed by: SURGERY

## 2018-07-27 NOTE — PROGRESS NOTES
Patient comes in today to have suture removal from a recent excision of forehead lesion which was done by Dr. Fulton, pathology report was benign. The sutures were removed and there was no redness or drainage from the sight. The patient has no complaints.

## 2018-07-30 ENCOUNTER — OFFICE VISIT (OUTPATIENT)
Dept: CARDIOLOGY | Facility: CLINIC | Age: 73
End: 2018-07-30

## 2018-07-30 VITALS
HEIGHT: 66 IN | WEIGHT: 190 LBS | SYSTOLIC BLOOD PRESSURE: 130 MMHG | DIASTOLIC BLOOD PRESSURE: 64 MMHG | BODY MASS INDEX: 30.53 KG/M2 | HEART RATE: 80 BPM

## 2018-07-30 DIAGNOSIS — F17.200 SMOKER: ICD-10-CM

## 2018-07-30 DIAGNOSIS — I10 ESSENTIAL HYPERTENSION: ICD-10-CM

## 2018-07-30 DIAGNOSIS — E11.65 TYPE 2 DIABETES MELLITUS WITH HYPERGLYCEMIA, WITHOUT LONG-TERM CURRENT USE OF INSULIN (HCC): ICD-10-CM

## 2018-07-30 DIAGNOSIS — I25.10 CORONARY ARTERY DISEASE INVOLVING NATIVE CORONARY ARTERY OF NATIVE HEART WITHOUT ANGINA PECTORIS: Primary | ICD-10-CM

## 2018-07-30 DIAGNOSIS — E78.2 MIXED HYPERLIPIDEMIA: ICD-10-CM

## 2018-07-30 PROCEDURE — 99406 BEHAV CHNG SMOKING 3-10 MIN: CPT | Performed by: INTERNAL MEDICINE

## 2018-07-30 PROCEDURE — 99214 OFFICE O/P EST MOD 30 MIN: CPT | Performed by: INTERNAL MEDICINE

## 2018-07-30 RX ORDER — LOSARTAN POTASSIUM 50 MG/1
50 TABLET ORAL DAILY
Qty: 90 TABLET | Refills: 3 | Status: SHIPPED | OUTPATIENT
Start: 2018-07-30 | End: 2018-07-31 | Stop reason: SDUPTHER

## 2018-07-30 NOTE — PROGRESS NOTES
[facility] at [facility]  Consultation H&P  Katharine Fitzpatrick  1945  [phone]    VISIT DATE:  7/30/2018      PCP: Murtaza Elder MD  [address] [id number]    IDENTIFICATION: A 73 y.o. female, originally from [place], a retired sutrgical. RN. Moved to be closer to family    CC:  Chief Complaint   Patient presents with   • Essential hypertension       PROBLEM LIST:  1. CP  2. HTN  3. Murmur  2/18 echo w LVOT turbulence  4. HLD, on crestor   1. 1/10/18 lipids:   HDL 45 LDL 87  5. DM II  1. Dx 2010, insulin since 2004  2. 1/10/18 A1c 7.3  6. Tobacco abuse  1. 1/2 PPD  7. RA  1. Sees Dr. Morgan, on humria  8. Depression/anxiety    Allergies  Allergies   Allergen Reactions   • Penicillins Rash       Current Medications    Current Outpatient Prescriptions:   •  adalimumab (HUMIRA) 40 MG/0.8ML Prefilled Syringe Kit injection, Inject 40 mg under the skin 1 (One) Time., Disp: , Rfl:   •  cyclobenzaprine (FLEXERIL) 10 MG tablet, Take 10 mg by mouth 2 (Two) Times a Day., Disp: , Rfl:   •  fluticasone-salmeterol (ADVAIR DISKUS) 250-50 MCG/DOSE DISKUS, Inhale 1 puff 2 (Two) Times a Day., Disp: 60 each, Rfl: 5  •  insulin aspart (novoLOG FLEXPEN) 100 UNIT/ML solution pen-injector sc pen, Inject  under the skin 3 (Three) Times a Day With Meals. Patient taking 25 units as needed, Disp: , Rfl:   •  Insulin Glargine (LANTUS SOLOSTAR) 100 UNIT/ML injection pen, Inject 20 Units under the skin into the appropriate area as directed 2 (Two) Times a Day., Disp: , Rfl:   •  isosorbide mononitrate (IMDUR) 30 MG 24 hr tablet, Take 1 tablet by mouth Daily., Disp: 90 tablet, Rfl: 1  •  LORazepam (ATIVAN) 1 MG tablet, Take 2 mg by mouth 2 (Two) Times a Day. Patient takes 2 tablets 2 times a day., Disp: , Rfl:   •  methocarbamol (ROBAXIN) 500 MG tablet, Take 1,000 mg by mouth 2 (Two) Times a Day. Patient takes 2 tablets 2 times a day., Disp: , Rfl:   •  metoprolol tartrate (LOPRESSOR)  50 MG tablet, Take 1 tablet by mouth 2 (Two) Times a Day., Disp: 180 tablet, Rfl: 3  •  nitrofurantoin (MACRODANTIN) 100 MG capsule, Take 1 capsule by mouth Daily., Disp: 30 capsule, Rfl: 5  •  raNITIdine (ZANTAC) 150 MG tablet, Take 1 tablet by mouth 2 (Two) Times a Day. (Patient taking differently: Take 150 mg by mouth Daily.), Disp: 180 tablet, Rfl: 3  •  venlafaxine XR (EFFEXOR-XR) 150 MG 24 hr capsule, Take 1 capsule by mouth 2 (Two) Times a Day., Disp: 180 capsule, Rfl: 3     History of Present Illness   HPI  This is a 72-year-old female with the above mentioned PMH who presents for consult from PCP for evaluation of chest pain.    Pt reports CP that occurs 1-2 times per week most weeks. She describes crushing pressure that has occurred randomly, usually at rest.  She endorses dyspnea on exertion with walking for longer periods doing activities like walking through the grocery store.  She has been diabetic for 8 years and on insulin for 5 years.  Her most recent A1c was 7.3.  She is treated for hyperlipidemia and hypertension with no recent changes.  She is a long-time smoker and has recently cut back to one half PPD. She had previously seen cardiology 15 years ago where she had a stress and echo, data deficient.    Pt denies any dyspnea at rest, orthopnea, PND, palpitations, lower extremity edema, or claudication. Pt denies history of CHF, DVT, PE, MI, CVA, TIA, or rheumatic fever.  She does have family history of early CAD.       ROS  ROS    SOCIAL HX  Social History     Social History   • Marital status:      Spouse name: N/A   • Number of children: N/A   • Years of education: N/A     Occupational History   • Not on file.     Social History Main Topics   • Smoking status: Current Every Day Smoker     Packs/day: 0.50   • Smokeless tobacco: Never Used   • Alcohol use No   • Drug use: No   • Sexual activity: Defer     Other Topics Concern   • Not on file     Social History Narrative   • No narrative on  "file       FAMILY HX  Family History   Problem Relation Age of Onset   • Cancer Mother    • Uterine cancer Mother    • Heart attack Father        Vitals:    07/30/18 1437   BP: 130/64   BP Location: Right arm   Patient Position: Sitting   Pulse: 80   Weight: 86.2 kg (190 lb)   Height: 167.6 cm (66\")       PHYSICAL EXAMINATION:  Physical Exam   Constitutional: She is oriented to person, place, and time. She appears well-developed and well-nourished. No distress.   obese   HENT:   Head: Normocephalic and atraumatic.   Right Ear: External ear normal.   Left Ear: External ear normal.   Nose: Nose normal.   Eyes: Conjunctivae and EOM are normal.   Neck: Neck supple. No hepatojugular reflux and no JVD present. Carotid bruit is not present. No thyromegaly present.   Cardiovascular: Normal rate, regular rhythm, S1 normal, S2 normal, intact distal pulses and normal pulses.  Exam reveals no gallop, no distant heart sounds and no midsystolic click.    Murmur heard.  Pulses:       Radial pulses are 2+ on the right side, and 2+ on the left side.        Dorsalis pedis pulses are 2+ on the right side, and 2+ on the left side.        Posterior tibial pulses are 2+ on the right side, and 2+ on the left side.   Pulmonary/Chest: Effort normal and breath sounds normal. No respiratory distress. She has no decreased breath sounds. She has no wheezes. She has no rhonchi. She has no rales.   Abdominal: Soft. Bowel sounds are normal. There is no hepatosplenomegaly. There is no tenderness.   Musculoskeletal: Normal range of motion. She exhibits edema (trace).   Neurological: She is alert and oriented to person, place, and time.   No focal deficits.   Skin: Skin is warm and dry. No erythema.   Psychiatric: She has a normal mood and affect. Thought content normal.   Nursing note and vitals reviewed.      Diagnostic Data:  ProceduresASSESSMENT:     Diagnosis Plan   1. Coronary artery disease involving native coronary artery of native heart " without angina pectoris     2. Essential hypertension     3. Mixed hyperlipidemia     4. Type 2 diabetes mellitus with hyperglycemia, without long-term current use of insulin (CMS/[facility])     5. Smoker       PLAN:  1. Low risk noninvasive ischemic eval 2/18  2. Controlled, continue antihypertensives.   Transition quinapril to losartan 50 due to cough  3. Controlled, continue statin therapy with crestor  4. DM II modestly controlled on insulin only. Pt counseled that cardiovascular risk increases with a1cs >7.0.   5. Pt counseled for 5 mn on the importance of smoking cessation and methods to aid in cessation. Pt desires to quit this year and would like to try it on her own.   6. Murmur w LVOT turbulence no AS.       7/30/2018  2:55 PM    Joe Hernandez MD, FACC

## 2018-07-31 RX ORDER — LOSARTAN POTASSIUM 50 MG/1
50 TABLET ORAL DAILY
Qty: 90 TABLET | Refills: 3 | Status: SHIPPED | OUTPATIENT
Start: 2018-07-31 | End: 2022-06-21

## 2018-08-03 ENCOUNTER — TRANSCRIBE ORDERS (OUTPATIENT)
Dept: ADMINISTRATIVE | Facility: HOSPITAL | Age: 73
End: 2018-08-03

## 2018-08-03 ENCOUNTER — HOSPITAL ENCOUNTER (OUTPATIENT)
Dept: GENERAL RADIOLOGY | Facility: HOSPITAL | Age: 73
Discharge: HOME | End: 2018-08-03
Attending: INTERNAL MEDICINE | Admitting: INTERNAL MEDICINE

## 2018-08-03 DIAGNOSIS — R05.9 COUGH: Primary | ICD-10-CM

## 2018-08-03 PROCEDURE — 71046 X-RAY EXAM CHEST 2 VIEWS: CPT

## 2018-08-27 ENCOUNTER — TRANSCRIBE ORDERS (OUTPATIENT)
Dept: ADMINISTRATIVE | Facility: HOSPITAL | Age: 73
End: 2018-08-27

## 2018-08-27 ENCOUNTER — APPOINTMENT (OUTPATIENT)
Dept: LAB | Facility: HOSPITAL | Age: 73
End: 2018-08-27
Attending: INTERNAL MEDICINE

## 2018-08-27 DIAGNOSIS — R07.9 CHEST PAIN, UNSPECIFIED TYPE: Primary | ICD-10-CM

## 2018-08-27 DIAGNOSIS — E11.9 DIABETES MELLITUS WITHOUT COMPLICATION (HCC): ICD-10-CM

## 2018-08-27 PROCEDURE — 85025 COMPLETE CBC W/AUTO DIFF WBC: CPT | Performed by: INTERNAL MEDICINE

## 2018-08-27 PROCEDURE — 83036 HEMOGLOBIN GLYCOSYLATED A1C: CPT | Performed by: INTERNAL MEDICINE

## 2018-08-27 PROCEDURE — 84484 ASSAY OF TROPONIN QUANT: CPT | Performed by: INTERNAL MEDICINE

## 2018-08-27 PROCEDURE — 36415 COLL VENOUS BLD VENIPUNCTURE: CPT | Performed by: INTERNAL MEDICINE

## 2018-08-27 PROCEDURE — 80053 COMPREHEN METABOLIC PANEL: CPT | Performed by: INTERNAL MEDICINE

## 2018-10-28 ENCOUNTER — HOSPITAL ENCOUNTER (EMERGENCY)
Facility: HOSPITAL | Age: 73
Discharge: HOME | End: 2018-10-28
Attending: EMERGENCY MEDICINE | Admitting: EMERGENCY MEDICINE

## 2018-10-28 VITALS
DIASTOLIC BLOOD PRESSURE: 77 MMHG | RESPIRATION RATE: 16 BRPM | BODY MASS INDEX: 30.18 KG/M2 | HEIGHT: 66 IN | WEIGHT: 187.8 LBS | HEART RATE: 90 BPM | TEMPERATURE: 98.3 F | SYSTOLIC BLOOD PRESSURE: 160 MMHG | OXYGEN SATURATION: 93 %

## 2018-10-28 DIAGNOSIS — F41.9 ANXIETY DISORDER, UNSPECIFIED TYPE: Primary | ICD-10-CM

## 2018-10-28 PROCEDURE — 99283 EMERGENCY DEPT VISIT LOW MDM: CPT

## 2018-10-28 RX ORDER — HYDROXYZINE PAMOATE 50 MG/1
50 CAPSULE ORAL ONCE
Status: COMPLETED | OUTPATIENT
Start: 2018-10-28 | End: 2018-10-28

## 2018-10-28 RX ORDER — HYDROXYZINE PAMOATE 50 MG/1
50 CAPSULE ORAL 3 TIMES DAILY PRN
Qty: 30 CAPSULE | Refills: 0 | Status: SHIPPED | OUTPATIENT
Start: 2018-10-28 | End: 2020-12-23

## 2018-10-28 RX ADMIN — HYDROXYZINE PAMOATE 50 MG: 50 CAPSULE ORAL at 17:50

## 2018-10-31 ENCOUNTER — HOSPITAL ENCOUNTER (EMERGENCY)
Facility: HOSPITAL | Age: 73
Discharge: HOME | End: 2018-10-31
Attending: EMERGENCY MEDICINE | Admitting: EMERGENCY MEDICINE

## 2018-10-31 VITALS
SYSTOLIC BLOOD PRESSURE: 142 MMHG | DIASTOLIC BLOOD PRESSURE: 66 MMHG | BODY MASS INDEX: 33.13 KG/M2 | HEART RATE: 70 BPM | TEMPERATURE: 98.3 F | OXYGEN SATURATION: 95 % | HEIGHT: 63 IN | WEIGHT: 187 LBS | RESPIRATION RATE: 18 BRPM

## 2018-10-31 DIAGNOSIS — F41.9 ANXIETY: Primary | ICD-10-CM

## 2018-10-31 PROCEDURE — 99282 EMERGENCY DEPT VISIT SF MDM: CPT

## 2018-11-01 NOTE — ED PROVIDER NOTES
Subjective   History of Present Illness  Patient is a 73-year-old female who presents today indicating that she is unable to get into see her primary care provider and she has been out of her lorazepam for 2 weeks.  She usually take 2 mg in the morning and 2 mg at bedtime.  She states that she recently moved here from [place] and has not been able to establish care with a psychiatrist.  She was seen here on the 28th and was given hydroxyzine and she states that it does not help.  She states that she needs me to give her lorazepam until she can follow up with Dr. Elder.  She denies chest pain or shortness of breath.  She states that she is just very anxious and can't sleep.  She denies suicidal or homicidal ideations.  Review of Systems   All other systems reviewed and are negative.      Past Medical History:   Diagnosis Date   • Anxiety    • Arthritis    • Colitis    • Colon polyp    • Depression    • Diabetes mellitus (CMS/HCC)    • Headache    • Heart murmur    • Hyperlipidemia    • Hypertension    • Peptic ulceration    • Urinary tract infection        Allergies   Allergen Reactions   • Penicillins Rash       Past Surgical History:   Procedure Laterality Date   • CHOLECYSTECTOMY  2010   • HYSTERECTOMY  2007   • SALPINGO OOPHORECTOMY     • SPINAL FUSION  2002       Family History   Problem Relation Age of Onset   • Cancer Mother    • Uterine cancer Mother    • Heart attack Father        Social History     Social History   • Marital status:      Social History Main Topics   • Smoking status: Current Every Day Smoker     Packs/day: 0.50   • Smokeless tobacco: Never Used   • Alcohol use No   • Drug use: No   • Sexual activity: Defer     Other Topics Concern   • Not on file           Objective   Physical Exam   Constitutional: She appears well-developed and well-nourished.   HENT:   Head: Normocephalic and atraumatic.   Mouth/Throat: Oropharynx is clear and moist.   Eyes: Pupils are equal, round, and  reactive to light. EOM are normal.   Neck: Normal range of motion.   Cardiovascular: Normal rate.    Pulmonary/Chest: Effort normal.   Musculoskeletal: Normal range of motion.   Skin: Skin is warm and dry. Capillary refill takes less than 2 seconds.   Psychiatric: She has a normal mood and affect. Her behavior is normal. Thought content normal.   Nursing note and vitals reviewed.      Procedures           ED Course        Patient does not appear to be having acute anxiety.  Her vital signs are stable.  Her heart rate 70.  She repeatedly asked for the lorazepam 2 mg twice a day.  She states she has an appointment with someone at 3:00 this afternoon which she believes is a nurse practitioner.  [facility] as she is requesting a psychiatrist.  I explained to her that I would not be giving her lorazepam to the emergency Department because that was against our policy.  She then asked to speak to a physician and so Dr. Horan spoke with her and informed her of the same that we would not be prescribing the rest of him today.  He was discharged in stable condition.          [facility]      Final diagnoses:   Anxiety            Prema Chandler, APRN  10/31/18 [id number]

## 2018-11-30 RX ORDER — AMLODIPINE BESYLATE 10 MG/1
TABLET ORAL
Qty: 90 TABLET | Refills: 1 | OUTPATIENT
Start: 2018-11-30

## 2018-11-30 RX ORDER — FENOFIBRATE 160 MG/1
TABLET ORAL
Qty: 90 TABLET | Refills: 1 | OUTPATIENT
Start: 2018-11-30

## 2018-12-18 ENCOUNTER — TRANSCRIBE ORDERS (OUTPATIENT)
Dept: ADMINISTRATIVE | Facility: HOSPITAL | Age: 73
End: 2018-12-18

## 2018-12-18 DIAGNOSIS — R51.9 NONINTRACTABLE HEADACHE, UNSPECIFIED CHRONICITY PATTERN, UNSPECIFIED HEADACHE TYPE: Primary | ICD-10-CM

## 2018-12-20 ENCOUNTER — HOSPITAL ENCOUNTER (OUTPATIENT)
Dept: MRI IMAGING | Facility: HOSPITAL | Age: 73
Discharge: HOME | End: 2018-12-20
Attending: INTERNAL MEDICINE | Admitting: INTERNAL MEDICINE

## 2018-12-20 DIAGNOSIS — R51.9 NONINTRACTABLE HEADACHE, UNSPECIFIED CHRONICITY PATTERN, UNSPECIFIED HEADACHE TYPE: ICD-10-CM

## 2018-12-20 PROCEDURE — 70551 MRI BRAIN STEM W/O DYE: CPT

## 2018-12-20 RX ORDER — VENLAFAXINE HYDROCHLORIDE 150 MG/1
CAPSULE, EXTENDED RELEASE ORAL
Qty: 180 CAPSULE | Refills: 3 | Status: SHIPPED | OUTPATIENT
Start: 2018-12-20

## 2019-02-08 ENCOUNTER — OFFICE VISIT (OUTPATIENT)
Dept: OBSTETRICS AND GYNECOLOGY | Facility: CLINIC | Age: 74
End: 2019-02-08

## 2019-02-08 VITALS
DIASTOLIC BLOOD PRESSURE: 70 MMHG | HEIGHT: 66 IN | WEIGHT: 191 LBS | BODY MASS INDEX: 30.7 KG/M2 | SYSTOLIC BLOOD PRESSURE: 144 MMHG

## 2019-02-08 DIAGNOSIS — B37.2 YEAST DERMATITIS: Primary | ICD-10-CM

## 2019-02-08 PROCEDURE — 99204 OFFICE O/P NEW MOD 45 MIN: CPT | Performed by: OBSTETRICS & GYNECOLOGY

## 2019-02-08 RX ORDER — SULFAMETHOXAZOLE AND TRIMETHOPRIM 800; 160 MG/1; MG/1
1 TABLET ORAL 2 TIMES DAILY
Qty: 10 TABLET | Refills: 0 | Status: SHIPPED | OUTPATIENT
Start: 2019-02-08 | End: 2019-02-13

## 2019-02-08 RX ORDER — NYSTATIN AND TRIAMCINOLONE ACETONIDE 100000; 1 [USP'U]/G; MG/G
OINTMENT TOPICAL 2 TIMES DAILY
Qty: 60 G | Refills: 2 | Status: SHIPPED | OUTPATIENT
Start: 2019-02-08 | End: 2020-03-02

## 2019-02-08 NOTE — PROGRESS NOTES
Subjective   Chief Complaint   Patient presents with   • Rash     Redness on inner thighs/legs     Katharine Fitzpatrick is a 73 y.o. year old .  No LMP recorded. Patient is postmenopausal.  She presents to be seen because of redness and rash on inner thighs for about one month. Progressive but staying in the same area. Has been using moinstat--no help--pruritus is variable. DM is better controlled with last A1c below 7.      OTHER COMPLAINTS:  Nothing else    The following portions of the patient's history were reviewed and updated as appropriate:  She  has a past medical history of Anxiety, Arthritis, Colitis, Colon polyp, Depression, Diabetes mellitus (CMS/HCC), Headache, Heart murmur, Hyperlipidemia, Hypertension, Peptic ulceration, and Urinary tract infection.  She does not have any pertinent problems on file.  She  has a past surgical history that includes Spinal fusion (); Cholecystectomy (); Hysterectomy (); and Salpingoophorectomy.  Her family history includes Cancer in her mother; Heart attack in her father; Uterine cancer in her mother.  She  reports that she has been smoking.  She has been smoking about 0.50 packs per day. she has never used smokeless tobacco. She reports that she does not drink alcohol or use drugs.  Current Outpatient Medications   Medication Sig Dispense Refill   • adalimumab (HUMIRA) 40 MG/0.8ML Prefilled Syringe Kit injection Inject 40 mg under the skin 1 (One) Time.     • cyclobenzaprine (FLEXERIL) 10 MG tablet Take 10 mg by mouth 2 (Two) Times a Day.     • fluticasone-salmeterol (ADVAIR DISKUS) 250-50 MCG/DOSE DISKUS Inhale 1 puff 2 (Two) Times a Day. 60 each 5   • hydrOXYzine (VISTARIL) 50 MG capsule Take 1 capsule by mouth 3 (Three) Times a Day As Needed for Itching. 30 capsule 0   • insulin aspart (novoLOG FLEXPEN) 100 UNIT/ML solution pen-injector sc pen Inject  under the skin 3 (Three) Times a Day With Meals. Patient taking 25 units as needed     • Insulin  Glargine (LANTUS SOLOSTAR) 100 UNIT/ML injection pen Inject 20 Units under the skin into the appropriate area as directed 2 (Two) Times a Day.     • isosorbide mononitrate (IMDUR) 30 MG 24 hr tablet Take 1 tablet by mouth Daily. 90 tablet 1   • LORazepam (ATIVAN) 1 MG tablet Take 2 mg by mouth 2 (Two) Times a Day. Patient takes 2 tablets 2 times a day.     • losartan (COZAAR) 50 MG tablet Take 1 tablet by mouth Daily. 90 tablet 3   • methocarbamol (ROBAXIN) 500 MG tablet Take 1,000 mg by mouth 2 (Two) Times a Day. Patient takes 2 tablets 2 times a day.     • metoprolol tartrate (LOPRESSOR) 50 MG tablet Take 1 tablet by mouth 2 (Two) Times a Day. 180 tablet 3   • nitrofurantoin (MACRODANTIN) 100 MG capsule Take 1 capsule by mouth Daily. 30 capsule 5   • raNITIdine (ZANTAC) 150 MG tablet Take 1 tablet by mouth 2 (Two) Times a Day. (Patient taking differently: Take 150 mg by mouth Daily.) 180 tablet 3   • venlafaxine XR (EFFEXOR-XR) 150 MG 24 hr capsule TAKE 1 CAPSULE TWICE DAILY 180 capsule 3     No current facility-administered medications for this visit.      Current Outpatient Medications on File Prior to Visit   Medication Sig   • adalimumab (HUMIRA) 40 MG/0.8ML Prefilled Syringe Kit injection Inject 40 mg under the skin 1 (One) Time.   • cyclobenzaprine (FLEXERIL) 10 MG tablet Take 10 mg by mouth 2 (Two) Times a Day.   • fluticasone-salmeterol (ADVAIR DISKUS) 250-50 MCG/DOSE DISKUS Inhale 1 puff 2 (Two) Times a Day.   • hydrOXYzine (VISTARIL) 50 MG capsule Take 1 capsule by mouth 3 (Three) Times a Day As Needed for Itching.   • insulin aspart (novoLOG FLEXPEN) 100 UNIT/ML solution pen-injector sc pen Inject  under the skin 3 (Three) Times a Day With Meals. Patient taking 25 units as needed   • Insulin Glargine (LANTUS SOLOSTAR) 100 UNIT/ML injection pen Inject 20 Units under the skin into the appropriate area as directed 2 (Two) Times a Day.   • isosorbide mononitrate (IMDUR) 30 MG 24 hr tablet Take 1 tablet by  "mouth Daily.   • LORazepam (ATIVAN) 1 MG tablet Take 2 mg by mouth 2 (Two) Times a Day. Patient takes 2 tablets 2 times a day.   • losartan (COZAAR) 50 MG tablet Take 1 tablet by mouth Daily.   • methocarbamol (ROBAXIN) 500 MG tablet Take 1,000 mg by mouth 2 (Two) Times a Day. Patient takes 2 tablets 2 times a day.   • metoprolol tartrate (LOPRESSOR) 50 MG tablet Take 1 tablet by mouth 2 (Two) Times a Day.   • nitrofurantoin (MACRODANTIN) 100 MG capsule Take 1 capsule by mouth Daily.   • raNITIdine (ZANTAC) 150 MG tablet Take 1 tablet by mouth 2 (Two) Times a Day. (Patient taking differently: Take 150 mg by mouth Daily.)   • venlafaxine XR (EFFEXOR-XR) 150 MG 24 hr capsule TAKE 1 CAPSULE TWICE DAILY     No current facility-administered medications on file prior to visit.      She is allergic to penicillins.    Social History    Tobacco Use      Smoking status: Current Every Day Smoker        Packs/day: 0.50      Smokeless tobacco: Never Used    Review of Systems  Consitutional POS: nothing reported    NEG: anorexia or night sweats   Gastointestinal POS: nothing reported    NEG: bloating, change in bowel habits, melena or reflux symptoms   Genitourinary POS: nothing reported    NEG: dysuria or hematuria   Integument POS: rashes    NEG: moles that are changing in size, shape, color   Breast POS: nothing reported    NEG: persistent breast lump, skin dimpling or nipple discharge         Respiratory: negative  Cardiovascular: negative          Objective   /70   Ht 167.6 cm (66\")   Wt 86.6 kg (191 lb)   BMI 30.83 kg/m²     General:  well developed; well nourished  no acute distress   Skin:  No suspicious lesions seen  Rash noted on intertig   Thyroid: not examined   Lungs:  breathing is unlabored   Heart:  Not performed.   Breasts:  Not performed.   Abdomen: soft, non-tender; no masses  no umbilical or inguinal hernias are present  no hepato-splenomegaly   Pelvis: Clinical staff was present for exam  Erythema in " the intra-trigeminus zones as well as under the pannus, no evidence of exudate     Psychiatric: Alert and oriented ×3, mood and affect appropriate  HEENT: Atraumatic, normocephalic, normal scleral icterus  Extremities: 2+ pulses bilaterally, no edema      Lab Review   No data reviewed    Imaging   No data reviewed        Assessment   1. 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 Bactrim DS BID x 5 days, Nystatin/triamcinolone ointment twice a day for 14-21 days  2. If become sreucrernt will then have to do nystatin powder regularly    No orders of the defined types were placed in this encounter.         This note was electronically signed.      February 8, 2019

## 2019-03-19 ENCOUNTER — TRANSCRIBE ORDERS (OUTPATIENT)
Dept: ADMINISTRATIVE | Facility: HOSPITAL | Age: 74
End: 2019-03-19

## 2019-03-19 DIAGNOSIS — Z12.39 SCREENING BREAST EXAMINATION: Primary | ICD-10-CM

## 2019-04-09 ENCOUNTER — HOSPITAL ENCOUNTER (OUTPATIENT)
Dept: GENERAL RADIOLOGY | Facility: HOSPITAL | Age: 74
Discharge: HOME | End: 2019-04-09
Admitting: PAIN MEDICINE

## 2019-04-09 ENCOUNTER — TRANSCRIBE ORDERS (OUTPATIENT)
Dept: GENERAL RADIOLOGY | Facility: HOSPITAL | Age: 74
End: 2019-04-09

## 2019-04-09 DIAGNOSIS — F19.20 OTHER PSYCHOACTIVE SUBSTANCE DEPENDENCE, UNCOMPLICATED (HCC): ICD-10-CM

## 2019-04-09 DIAGNOSIS — F19.20 OTHER PSYCHOACTIVE SUBSTANCE DEPENDENCE, UNCOMPLICATED (HCC): Primary | ICD-10-CM

## 2019-04-09 PROCEDURE — 72100 X-RAY EXAM L-S SPINE 2/3 VWS: CPT

## 2019-04-26 ENCOUNTER — APPOINTMENT (OUTPATIENT)
Dept: MAMMOGRAPHY | Facility: HOSPITAL | Age: 74
End: 2019-04-26

## 2019-06-30 ENCOUNTER — HOSPITAL ENCOUNTER (EMERGENCY)
Facility: HOSPITAL | Age: 74
Discharge: HOME | End: 2019-06-30
Attending: EMERGENCY MEDICINE | Admitting: EMERGENCY MEDICINE

## 2019-06-30 VITALS
HEART RATE: 97 BPM | SYSTOLIC BLOOD PRESSURE: 170 MMHG | DIASTOLIC BLOOD PRESSURE: 80 MMHG | WEIGHT: 182.4 LBS | RESPIRATION RATE: 18 BRPM | TEMPERATURE: 98.5 F | BODY MASS INDEX: 29.32 KG/M2 | HEIGHT: 66 IN | OXYGEN SATURATION: 93 %

## 2019-06-30 DIAGNOSIS — Z76.5 DRUG-SEEKING BEHAVIOR: ICD-10-CM

## 2019-06-30 DIAGNOSIS — F13.930 BENZODIAZEPINE WITHDRAWAL WITHOUT COMPLICATION (HCC): ICD-10-CM

## 2019-06-30 DIAGNOSIS — R46.89 MANIPULATIVE BEHAVIOR: ICD-10-CM

## 2019-06-30 DIAGNOSIS — F13.20 BENZODIAZEPINE DEPENDENCE, CONTINUOUS (HCC): Primary | ICD-10-CM

## 2019-06-30 PROCEDURE — 99283 EMERGENCY DEPT VISIT LOW MDM: CPT

## 2019-06-30 RX ORDER — LORAZEPAM 1 MG/1
1 TABLET ORAL EVERY 6 HOURS PRN
Qty: 14 TABLET | Refills: 0 | Status: SHIPPED | OUTPATIENT
Start: 2019-06-30 | End: 2019-06-30

## 2019-06-30 RX ORDER — LORAZEPAM 0.5 MG/1
2 TABLET ORAL ONCE
Status: COMPLETED | OUTPATIENT
Start: 2019-06-30 | End: 2019-06-30

## 2019-06-30 RX ADMIN — LORAZEPAM 2 MG: 0.5 TABLET ORAL at 11:31

## 2019-07-01 ENCOUNTER — OFFICE VISIT (OUTPATIENT)
Dept: CARDIOLOGY | Facility: CLINIC | Age: 74
End: 2019-07-01

## 2019-07-01 VITALS
HEART RATE: 74 BPM | DIASTOLIC BLOOD PRESSURE: 88 MMHG | SYSTOLIC BLOOD PRESSURE: 142 MMHG | WEIGHT: 180.4 LBS | OXYGEN SATURATION: 96 % | HEIGHT: 67 IN | BODY MASS INDEX: 28.31 KG/M2

## 2019-07-01 DIAGNOSIS — E78.2 MIXED HYPERLIPIDEMIA: ICD-10-CM

## 2019-07-01 DIAGNOSIS — E11.65 TYPE 2 DIABETES MELLITUS WITH HYPERGLYCEMIA, WITH LONG-TERM CURRENT USE OF INSULIN (HCC): ICD-10-CM

## 2019-07-01 DIAGNOSIS — I10 ESSENTIAL HYPERTENSION: ICD-10-CM

## 2019-07-01 DIAGNOSIS — R07.1 CHEST PAIN ON BREATHING: Primary | ICD-10-CM

## 2019-07-01 DIAGNOSIS — Z79.4 TYPE 2 DIABETES MELLITUS WITH HYPERGLYCEMIA, WITH LONG-TERM CURRENT USE OF INSULIN (HCC): ICD-10-CM

## 2019-07-01 PROCEDURE — 99214 OFFICE O/P EST MOD 30 MIN: CPT | Performed by: INTERNAL MEDICINE

## 2019-07-01 PROCEDURE — 93000 ELECTROCARDIOGRAM COMPLETE: CPT | Performed by: INTERNAL MEDICINE

## 2019-07-01 RX ORDER — NITROGLYCERIN 0.4 MG/1
0.4 TABLET SUBLINGUAL
Status: DISCONTINUED | OUTPATIENT
Start: 2019-07-01 | End: 2019-07-02
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
## 2019-07-02 RX ORDER — NITROGLYCERIN 0.4 MG/1
TABLET SUBLINGUAL
Qty: 100 TABLET | Refills: 11 | Status: SHIPPED | OUTPATIENT
Start: 2019-07-02

## 2019-08-01 ENCOUNTER — HOSPITAL ENCOUNTER (OUTPATIENT)
Dept: MAMMOGRAPHY | Facility: HOSPITAL | Age: 74
Discharge: HOME | End: 2019-08-01
Admitting: OBSTETRICS & GYNECOLOGY

## 2019-08-01 ENCOUNTER — APPOINTMENT (OUTPATIENT)
Dept: MAMMOGRAPHY | Facility: HOSPITAL | Age: 74
End: 2019-08-01

## 2019-08-01 DIAGNOSIS — Z12.39 SCREENING BREAST EXAMINATION: ICD-10-CM

## 2019-08-01 PROCEDURE — 77063 BREAST TOMOSYNTHESIS BI: CPT

## 2019-08-01 PROCEDURE — 77067 SCR MAMMO BI INCL CAD: CPT

## 2019-09-27 ENCOUNTER — APPOINTMENT (OUTPATIENT)
Dept: MAMMOGRAPHY | Facility: HOSPITAL | Age: 74
End: 2019-09-27

## 2019-11-05 ENCOUNTER — TRANSCRIBE ORDERS (OUTPATIENT)
Dept: ADMINISTRATIVE | Facility: HOSPITAL | Age: 74
End: 2019-11-05

## 2019-11-05 DIAGNOSIS — R11.0 NAUSEA: Primary | ICD-10-CM

## 2019-12-03 ENCOUNTER — HOSPITAL ENCOUNTER (OUTPATIENT)
Dept: NUCLEAR MEDICINE | Facility: HOSPITAL | Age: 74
Discharge: HOME | End: 2019-12-03

## 2019-12-03 DIAGNOSIS — R11.0 NAUSEA: ICD-10-CM

## 2019-12-03 PROCEDURE — 0 TECHNETIUM SULFUR COLLOID: Performed by: INTERNAL MEDICINE

## 2019-12-03 PROCEDURE — 78264 GASTRIC EMPTYING IMG STUDY: CPT

## 2019-12-03 PROCEDURE — A9541 TC99M SULFUR COLLOID: HCPCS | Performed by: INTERNAL MEDICINE

## 2019-12-03 RX ADMIN — TECHNETIUM TC 99M SULFUR COLLOID 1 DOSE: KIT at 07:20

## 2019-12-04 NOTE — PROGRESS NOTES
"Subjective   Katharine Fitzpatrick is a 74 y.o. female.   Chief Complaint   Patient presents with   • Nausea   • Colonoscopy     History of Present Illness   Ms. Fitzpatrick is a 74-year-old female patient who comes the office today to discuss screening colonoscopy.  Additionally, she complains of nausea and chronic constipation.  She reports that her last colonoscopy was performed 4 years ago in [place].  She describes having \"precancerous polyps.\"  She reports that 1 of these polyps was large and was not completely removed.  She has a longstanding history of chronic constipation, which may be slightly worse.  She has tried multiple laxatives including senna, Aragon milk of magnesia, and Dulcolax, all without relief.  She reports a longstanding history of using laxatives to have a bowel movement.  She denies bright red blood per rectum or melena.  It does not seem that she has ever tried MiraLAX.  She reports her recent 12 pound weight loss, which has been unintentional.  She reports occasional right-sided abdominal pain.  Additionally, she reports having nausea for the past 1 week.  She cannot identify any particular exacerbating or alleviating factors.  Reports that the nausea she had resolved on its own without particular intervention.  She denies any ongoing nausea at this point.        The following portions of the patient's history were reviewed and updated as appropriate: allergies, current medications, past family history, past medical history, past social history, past surgical history and problem list.    Patient Active Problem List   Diagnosis   • Essential hypertension   • Dyslipidemia   • Type 2 diabetes mellitus without complication, with long-term current use of insulin (CMS/HCC)   • Tobacco abuse       Past Medical History:   Diagnosis Date   • Anxiety    • Arthritis    • Colon polyp     adenomatous polyps   • Depression    • Diabetes mellitus (CMS/HCC)    • Headache    • Heart murmur    • Hyperlipidemia  "   • Hypertension    • Peptic ulceration    • Urinary tract infection        Past Surgical History:   Procedure Laterality Date   • ABDOMINAL HYSTERECTOMY  2007   • COLONOSCOPY W/ POLYPECTOMY     • LAPAROSCOPIC CHOLECYSTECTOMY  2010   • SALPINGO OOPHORECTOMY Bilateral 2009   • SPINAL FUSION  2002       Medications:     Current Outpatient Medications:   •  albuterol sulfate HFA (PROAIR HFA) 108 (90 Base) MCG/ACT inhaler, ProAir HFA 90 mcg/actuation aerosol inhaler, Disp: , Rfl:   •  amLODIPine (NORVASC) 5 MG tablet, amlodipine 5 mg tablet  1 qd, Disp: , Rfl:   •  fluticasone-salmeterol (ADVAIR DISKUS) 250-50 MCG/DOSE DISKUS, Inhale 1 puff 2 (Two) Times a Day., Disp: 60 each, Rfl: 5  •  hydrOXYzine (VISTARIL) 50 MG capsule, Take 1 capsule by mouth 3 (Three) Times a Day As Needed for Itching., Disp: 30 capsule, Rfl: 0  •  insulin aspart (novoLOG FLEXPEN) 100 UNIT/ML solution pen-injector sc pen, Inject  under the skin 3 (Three) Times a Day With Meals. Patient taking 25 units as needed, Disp: , Rfl:   •  LORazepam (ATIVAN) 1 MG tablet, Take 2 mg by mouth 2 (Two) Times a Day. Patient takes 2 tablets 2 times a day., Disp: , Rfl:   •  losartan (COZAAR) 50 MG tablet, Take 1 tablet by mouth Daily., Disp: 90 tablet, Rfl: 3  •  methocarbamol (ROBAXIN) 500 MG tablet, Take 1,000 mg by mouth 2 (Two) Times a Day. Patient takes 2 tablets 2 times a day., Disp: , Rfl:   •  metoprolol tartrate (LOPRESSOR) 50 MG tablet, Take 1 tablet by mouth 2 (Two) Times a Day., Disp: 180 tablet, Rfl: 3  •  nitrofurantoin (MACRODANTIN) 100 MG capsule, Take 1 capsule by mouth Daily., Disp: 30 capsule, Rfl: 5  •  nitroglycerin (NITROSTAT) 0.4 MG SL tablet, 1 under the tongue as needed for angina, may repeat q5mins for up three doses, Disp: 100 tablet, Rfl: 11  •  nystatin-triamcinolone (MYCOLOG) 913171-1.1 UNIT/GM-% ointment, Apply  topically to the appropriate area as directed 2 (Two) Times a Day. BID 14-21 days, Disp: 60 g, Rfl: 2  •  venlafaxine XR  (EFFEXOR-XR) 150 MG 24 hr capsule, TAKE 1 CAPSULE TWICE DAILY, Disp: 180 capsule, Rfl: 3    Allergies:   Allergies   Allergen Reactions   • Penicillins Rash         Family History   Problem Relation Age of Onset   • Uterine cancer Mother         widely metastatic   • Breast cancer Mother    • Heart attack Father        Social History     Socioeconomic History   • Marital status:      Spouse name: Not on file   • Number of children: Not on file   • Years of education: Not on file   • Highest education level: Not on file   Tobacco Use   • Smoking status: Current Every Day Smoker     Packs/day: 0.50     Years: 56.00     Pack years: 28.00     Types: Cigarettes   • Smokeless tobacco: Never Used   Substance and Sexual Activity   • Alcohol use: No   • Drug use: No   • Sexual activity: Defer     Partners: Male     Birth control/protection: Post-menopausal, Surgical       Review of Systems   Constitutional: Negative for chills, fever and unexpected weight change.   HENT: Negative for hearing loss, trouble swallowing and voice change.    Eyes: Negative for visual disturbance.   Respiratory: Negative for apnea, cough, chest tightness, shortness of breath and wheezing.    Cardiovascular: Negative for chest pain, palpitations and leg swelling.   Gastrointestinal: Positive for abdominal pain, constipation and nausea. Negative for abdominal distention, anal bleeding, blood in stool, diarrhea, rectal pain and vomiting.   Endocrine: Negative for cold intolerance and heat intolerance.   Genitourinary: Negative for difficulty urinating, dysuria and flank pain.   Musculoskeletal: Negative for back pain and gait problem.   Skin: Negative for color change, rash and wound.   Neurological: Negative for dizziness, syncope, speech difficulty, weakness, light-headedness, numbness and headaches.   Hematological: Negative for adenopathy. Does not bruise/bleed easily.   Psychiatric/Behavioral: Negative for confusion. The patient is not  "nervous/anxious.        Objective    /82   Pulse 70   Temp 98.4 °F (36.9 °C) (Temporal)   Ht 167.6 cm (66\")   Wt 81.6 kg (179 lb 12.8 oz)   SpO2 96%   BMI 29.02 kg/m²     Physical Exam   Constitutional: She is oriented to person, place, and time. She appears well-developed and well-nourished.   HENT:   Head: Normocephalic and atraumatic.   Eyes: No scleral icterus.   Neck: Neck supple.   Cardiovascular: Regular rhythm.   Pulmonary/Chest: Effort normal.   Abdominal: Soft. She exhibits no distension. There is no tenderness.   Neurological: She is alert and oriented to person, place, and time.   Skin: Skin is warm and dry.   Psychiatric: She has a normal mood and affect. Her behavior is normal.       Assessment/Plan   Katharine was seen today for nausea and colonoscopy.    Diagnoses and all orders for this visit:    Encounter for colonoscopy due to history of adenomatous colonic polyps  -     Case Request; Standing  -     Case Request    Other orders  -     Follow Anesthesia Guidelines / Standing Orders; Future  -     Provide NPO Instructions to Patient; Future  -     Chlorhexidine Skin Prep; Future  -     polyethylene glycol (MIRALAX) powder; Take 238 g by mouth 1 (One) Time for 1 dose. Mix 238 grams of miralax with 64 oz garotade and drink at 4 pm day before colonoscopy  -     bisacodyl (DULCOLAX) 5 MG EC tablet; Take 4 tablets once by mouth at 1 pm on day before colonoscopy.        We discussed colonoscopy for colon cancer screening/surveillance purposes.  We discussed the indications for surveillance colonoscopy as well as the risks, benefits and alternatives to this procedure. Risks including but not limited to perforation, bleeding,need for blood transfusion or emergent surgery ,and missed neoplasm were reviewed in detail with the patient. The necessary bowel preparation and pre-procedure clear liquid diet was explained in detail.  A written instructional handout was also provided.  Electronic " prescriptions for miralax and dulcolax were sent to the patient's pharmacy.  The patient was given an opportunity to ask questions.  The patient verbalized understanding of these recommendations and the plan of care. The patient is willing to proceed with colonoscopy and has signed informed consent in the office today.  My office will arrange scheduling for the colonoscopy procedure and pre-admission testing.

## 2019-12-05 ENCOUNTER — OFFICE VISIT (OUTPATIENT)
Dept: SURGERY | Facility: CLINIC | Age: 74
End: 2019-12-05

## 2019-12-05 VITALS
OXYGEN SATURATION: 96 % | DIASTOLIC BLOOD PRESSURE: 82 MMHG | BODY MASS INDEX: 28.9 KG/M2 | WEIGHT: 179.8 LBS | HEART RATE: 70 BPM | SYSTOLIC BLOOD PRESSURE: 140 MMHG | HEIGHT: 66 IN | TEMPERATURE: 98.4 F

## 2019-12-05 DIAGNOSIS — Z86.010 ENCOUNTER FOR COLONOSCOPY DUE TO HISTORY OF ADENOMATOUS COLONIC POLYPS: Primary | ICD-10-CM

## 2019-12-05 DIAGNOSIS — Z12.11 ENCOUNTER FOR COLONOSCOPY DUE TO HISTORY OF ADENOMATOUS COLONIC POLYPS: Primary | ICD-10-CM

## 2019-12-05 PROCEDURE — 99214 OFFICE O/P EST MOD 30 MIN: CPT | Performed by: SURGERY

## 2019-12-05 RX ORDER — SODIUM CHLORIDE, SODIUM LACTATE, POTASSIUM CHLORIDE, CALCIUM CHLORIDE 600; 310; 30; 20 MG/100ML; MG/100ML; MG/100ML; MG/100ML
50 INJECTION, SOLUTION INTRAVENOUS CONTINUOUS
Status: CANCELLED | OUTPATIENT
Start: 2020-01-27

## 2019-12-05 RX ORDER — POLYETHYLENE GLYCOL 3350 17 G/17G
238 POWDER, FOR SOLUTION ORAL ONCE
Qty: 238 G | Refills: 0 | Status: SHIPPED | OUTPATIENT
Start: 2019-12-05 | End: 2019-12-05

## 2019-12-05 RX ORDER — ALBUTEROL SULFATE 90 UG/1
2 AEROSOL, METERED RESPIRATORY (INHALATION) EVERY 4 HOURS PRN
COMMUNITY
End: 2020-07-01

## 2019-12-05 RX ORDER — BISACODYL 5 MG/1
TABLET, DELAYED RELEASE ORAL
Qty: 4 TABLET | Refills: 0 | Status: SHIPPED | OUTPATIENT
Start: 2019-12-05 | End: 2020-02-17 | Stop reason: HOSPADM

## 2019-12-05 RX ORDER — SODIUM CHLORIDE 0.9 % (FLUSH) 0.9 %
10 SYRINGE (ML) INJECTION AS NEEDED
Status: CANCELLED | OUTPATIENT
Start: 2020-01-27

## 2019-12-05 RX ORDER — AMLODIPINE BESYLATE 5 MG/1
10 TABLET ORAL DAILY
COMMUNITY
End: 2020-12-23

## 2019-12-05 RX ORDER — SODIUM CHLORIDE 0.9 % (FLUSH) 0.9 %
3 SYRINGE (ML) INJECTION EVERY 12 HOURS SCHEDULED
Status: CANCELLED | OUTPATIENT
Start: 2020-01-27

## 2019-12-16 RX ORDER — VENLAFAXINE HYDROCHLORIDE 150 MG/1
CAPSULE, EXTENDED RELEASE ORAL
Qty: 180 CAPSULE | Refills: 3 | OUTPATIENT
Start: 2019-12-16

## 2019-12-17 PROBLEM — Z86.010 ENCOUNTER FOR COLONOSCOPY DUE TO HISTORY OF ADENOMATOUS COLONIC POLYPS: Status: ACTIVE | Noted: 2019-12-17

## 2019-12-17 PROBLEM — Z12.11 ENCOUNTER FOR COLONOSCOPY DUE TO HISTORY OF ADENOMATOUS COLONIC POLYPS: Status: ACTIVE | Noted: 2019-12-17

## 2019-12-23 RX ORDER — INSULIN GLARGINE 100 [IU]/ML
10 INJECTION, SOLUTION SUBCUTANEOUS NIGHTLY
COMMUNITY
End: 2021-05-21 | Stop reason: SDUPTHER

## 2019-12-23 RX ORDER — ISOSORBIDE DINITRATE 30 MG/1
30 TABLET ORAL DAILY
COMMUNITY
End: 2021-12-10 | Stop reason: DRUGHIGH

## 2019-12-23 RX ORDER — ARIPIPRAZOLE 2 MG/1
2 TABLET ORAL DAILY
COMMUNITY
End: 2020-07-01

## 2019-12-23 RX ORDER — FENOFIBRATE 160 MG/1
160 TABLET ORAL DAILY
COMMUNITY

## 2019-12-23 RX ORDER — ROSUVASTATIN CALCIUM 10 MG/1
10 TABLET, COATED ORAL DAILY
COMMUNITY
End: 2021-03-10

## 2019-12-26 ENCOUNTER — TELEPHONE (OUTPATIENT)
Dept: SURGERY | Facility: CLINIC | Age: 74
End: 2019-12-26

## 2020-01-09 ENCOUNTER — TELEPHONE (OUTPATIENT)
Dept: SURGERY | Facility: CLINIC | Age: 75
End: 2020-01-09

## 2020-01-13 NOTE — PROGRESS NOTES
[facility] at [facility]  Office Progress Note  Katharine Fitzpatrick  1945      Visit Date: 01/15/20    PCP: Murtaza Elder MD  [address] [facility] [address] [place], [place] [id number]    IDENTIFICATION: A 74 y.o. female  originally from [place], a retired surgical. RN. Moved to be closer to family    PROBLEM LIST:  1. CP  1. 2/7/2018 MPS: Lexiscan Cardiolite WNL, EF 70%  2. HTN  3. Murmur  1. 2/18 echo w LVOT turbulence peak gradient 17mmHg  2. 6/19 LBBB  4. HLD, on crestor   1. 1/10/18 lipids:   HDL 45 LDL 87  5. DM II  1. Dx 2010, insulin since 2004  2. 1/10/18 A1c 7.3  3. 12/14/18 a1c 7.6  6. Tobacco abuse  1. 1/2 PPD  7. RA  1. Sees Dr. Morgan, on humria  8. Depression/anxiety    Chief Complaint   Patient presents with   • Coronary Artery Disease       Allergies  Allergies   Allergen Reactions   • Penicillins Rash       Current Medications    Current Outpatient Medications:   •  albuterol sulfate HFA (PROAIR HFA) 108 (90 Base) MCG/ACT inhaler, Inhale 2 puffs Every 4 (Four) Hours As Needed., Disp: , Rfl:   •  amLODIPine (NORVASC) 5 MG tablet, Take 10 mg by mouth Daily., Disp: , Rfl:   •  ARIPiprazole (ABILIFY) 2 MG tablet, Take 2 mg by mouth Daily., Disp: , Rfl:   •  bisacodyl (DULCOLAX) 5 MG EC tablet, Take 4 tablets once by mouth at 1 pm on day before colonoscopy., Disp: 4 tablet, Rfl: 0  •  fenofibrate 160 MG tablet, Take 160 mg by mouth Daily., Disp: , Rfl:   •  fluticasone-salmeterol (ADVAIR DISKUS) 250-50 MCG/DOSE DISKUS, Inhale 1 puff 2 (Two) Times a Day., Disp: 60 each, Rfl: 5  •  hydrOXYzine (VISTARIL) 50 MG capsule, Take 1 capsule by mouth 3 (Three) Times a Day As Needed for Itching., Disp: 30 capsule, Rfl: 0  •  insulin aspart (novoLOG FLEXPEN) 100 UNIT/ML solution pen-injector sc pen, Inject  under the skin into the appropriate area as directed 3 (Three) Times a Day With Meals. SSI, Disp: , Rfl:   •  Insulin Glargine (BASAGLAR KWIKPEN) 100 UNIT/ML injection pen, Inject 10 Units  under the skin into the appropriate area as directed Every Night., Disp: , Rfl:   •  isosorbide dinitrate (ISORDIL) 30 MG tablet, Take 30 mg by mouth 4 (Four) Times a Day., Disp: , Rfl:   •  LORazepam (ATIVAN) 1 MG tablet, Take 2 mg by mouth 2 (Two) Times a Day. Patient takes 2 tablets 2 times a day., Disp: , Rfl:   •  losartan (COZAAR) 50 MG tablet, Take 1 tablet by mouth Daily., Disp: 90 tablet, Rfl: 3  •  methocarbamol (ROBAXIN) 500 MG tablet, Take 1,000 mg by mouth 2 (Two) Times a Day. Patient takes 2 tablets 2 times a day., Disp: , Rfl:   •  metoprolol tartrate (LOPRESSOR) 50 MG tablet, Take 1 tablet by mouth 2 (Two) Times a Day., Disp: 180 tablet, Rfl: 3  •  nitrofurantoin (MACRODANTIN) 100 MG capsule, Take 1 capsule by mouth Daily., Disp: 30 capsule, Rfl: 5  •  nitroglycerin (NITROSTAT) 0.4 MG SL tablet, 1 under the tongue as needed for angina, may repeat q5mins for up three doses (Patient taking differently: Place 0.4 mg under the tongue Every 5 (Five) Minutes As Needed (pt states has never taken this). 1 under the tongue as needed for angina, may repeat q5mins for up three doses), Disp: 100 tablet, Rfl: 11  •  nystatin-triamcinolone (MYCOLOG) 491221-1.1 UNIT/GM-% ointment, Apply  topically to the appropriate area as directed 2 (Two) Times a Day. BID 14-21 days (Patient taking differently: Apply 1 application topically to the appropriate area as directed 2 (Two) Times a Day. BID 14-21 days), Disp: 60 g, Rfl: 2  •  rosuvastatin (CRESTOR) 10 MG tablet, Take 10 mg by mouth Daily., Disp: , Rfl:   •  venlafaxine XR (EFFEXOR-XR) 150 MG 24 hr capsule, TAKE 1 CAPSULE TWICE DAILY (Patient taking differently: Take 150 mg by mouth 2 (Two) Times a Day.), Disp: 180 capsule, Rfl: 3      History of Present Illness   Katharine Fitzpatrick is a 74 y.o. year old female here for follow up.  Chronic stable anginal equivalent unfortunately continues to smoke and has had some recent bronchitis pneumonitis for which she is taking steroid  "Dosepak.  She has had elevation of her blood sugars even prior to steroid utilization          OBJECTIVE:  Vitals:    01/15/20 1533   BP: 134/70   BP Location: Right arm   Patient Position: Sitting   Pulse: 68   SpO2: 94%   Weight: 82.1 kg (181 lb)   Height: 167.6 cm (66\")     Physical Exam   Constitutional: She appears well-developed and well-nourished.   Neck: Normal range of motion. Neck supple. No hepatojugular reflux and no JVD present. Carotid bruit is not present. No tracheal deviation present. No thyromegaly present.   Cardiovascular: Normal rate, regular rhythm, S1 normal, S2 normal, intact distal pulses and normal pulses. PMI is not displaced. Exam reveals no gallop, no distant heart sounds, no friction rub, no midsystolic click and no opening snap.   Murmur heard.  Pulses:       Radial pulses are 2+ on the right side, and 2+ on the left side.        Dorsalis pedis pulses are 2+ on the right side, and 2+ on the left side.        Posterior tibial pulses are 2+ on the right side, and 2+ on the left side.   Pulmonary/Chest: Effort normal. She has wheezes. She has no rales.   Abdominal: Soft. Bowel sounds are normal. She exhibits no mass. There is no tenderness. There is no guarding.       Diagnostic Data:  Procedures      ASSESSMENT:   Diagnosis Plan   1. Angina pectoris (CMS/HCC)     2. Essential hypertension     3. Mixed hyperlipidemia     4. Type 2 diabetes mellitus with hyperglycemia, without long-term current use of insulin (CMS/HCC)         PLAN:  Stable angina with low risk ischemic testing historically low threshold for invasive ischemic evaluation with any crescendo pattern, high risk EKG, isoenzyme elevation    Hypertension controlled combination therapy amlodipine metoprolol losartan    Dyslipidemia on statin and fenofibrate therapy    Diabetes insulin requiring with uncontrolled A1c greater than 9 per her last evaluation counseled regards lower carbohydrate intake    Greater than 10 minutes " smoking cessation counseling undertaken    Murtaza Elder MD, thank you for referring Ms. Fitzpatrick for evaluation.  I have forwarded my electronically generated recommendations to you for review.  Please do not hesitate to call with any questions.      Joe Hernandez MD, FACC

## 2020-01-15 ENCOUNTER — OFFICE VISIT (OUTPATIENT)
Dept: CARDIOLOGY | Facility: CLINIC | Age: 75
End: 2020-01-15

## 2020-01-15 VITALS
SYSTOLIC BLOOD PRESSURE: 134 MMHG | HEART RATE: 68 BPM | BODY MASS INDEX: 29.09 KG/M2 | DIASTOLIC BLOOD PRESSURE: 70 MMHG | OXYGEN SATURATION: 94 % | WEIGHT: 181 LBS | HEIGHT: 66 IN

## 2020-01-15 DIAGNOSIS — I10 ESSENTIAL HYPERTENSION: ICD-10-CM

## 2020-01-15 DIAGNOSIS — E78.2 MIXED HYPERLIPIDEMIA: ICD-10-CM

## 2020-01-15 DIAGNOSIS — I20.9 ANGINA PECTORIS (HCC): Primary | ICD-10-CM

## 2020-01-15 DIAGNOSIS — E11.65 TYPE 2 DIABETES MELLITUS WITH HYPERGLYCEMIA, WITHOUT LONG-TERM CURRENT USE OF INSULIN (HCC): ICD-10-CM

## 2020-01-15 PROCEDURE — 99214 OFFICE O/P EST MOD 30 MIN: CPT | Performed by: INTERNAL MEDICINE

## 2020-01-15 PROCEDURE — 99407 BEHAV CHNG SMOKING > 10 MIN: CPT | Performed by: INTERNAL MEDICINE

## 2020-01-27 ENCOUNTER — DOCUMENTATION (OUTPATIENT)
Dept: SURGERY | Facility: CLINIC | Age: 75
End: 2020-01-27

## 2020-01-27 ENCOUNTER — TELEPHONE (OUTPATIENT)
Dept: SURGERY | Facility: CLINIC | Age: 75
End: 2020-01-27

## 2020-01-27 NOTE — TELEPHONE ENCOUNTER
Patient is having other medical issues, needs to cancel colon and will call back when able to reschedule

## 2020-02-17 ENCOUNTER — HOSPITAL ENCOUNTER (OUTPATIENT)
Facility: HOSPITAL | Age: 75
Setting detail: HOSPITAL OUTPATIENT SURGERY
Discharge: HOME | End: 2020-02-17
Attending: SURGERY | Admitting: SURGERY

## 2020-02-17 ENCOUNTER — ANESTHESIA EVENT (OUTPATIENT)
Dept: GASTROENTEROLOGY | Facility: HOSPITAL | Age: 75
End: 2020-02-17

## 2020-02-17 ENCOUNTER — ANESTHESIA (OUTPATIENT)
Dept: GASTROENTEROLOGY | Facility: HOSPITAL | Age: 75
End: 2020-02-17

## 2020-02-17 VITALS
RESPIRATION RATE: 18 BRPM | WEIGHT: 171 LBS | DIASTOLIC BLOOD PRESSURE: 60 MMHG | OXYGEN SATURATION: 96 % | TEMPERATURE: 97.7 F | HEIGHT: 66 IN | HEART RATE: 64 BPM | BODY MASS INDEX: 27.48 KG/M2 | SYSTOLIC BLOOD PRESSURE: 142 MMHG

## 2020-02-17 DIAGNOSIS — Z12.11 ENCOUNTER FOR COLONOSCOPY DUE TO HISTORY OF ADENOMATOUS COLONIC POLYPS: ICD-10-CM

## 2020-02-17 DIAGNOSIS — Z86.010 ENCOUNTER FOR COLONOSCOPY DUE TO HISTORY OF ADENOMATOUS COLONIC POLYPS: ICD-10-CM

## 2020-02-17 LAB — GLUCOSE BLDC GLUCOMTR-MCNC: 209 MG/DL (ref 70–130)

## 2020-02-17 PROCEDURE — S0260 H&P FOR SURGERY: HCPCS | Performed by: SURGERY

## 2020-02-17 PROCEDURE — 25010000002 PROPOFOL 10 MG/ML EMULSION: Performed by: NURSE ANESTHETIST, CERTIFIED REGISTERED

## 2020-02-17 PROCEDURE — 45380 COLONOSCOPY AND BIOPSY: CPT | Performed by: SURGERY

## 2020-02-17 PROCEDURE — 82962 GLUCOSE BLOOD TEST: CPT

## 2020-02-17 PROCEDURE — 88305 TISSUE EXAM BY PATHOLOGIST: CPT | Performed by: SURGERY

## 2020-02-17 PROCEDURE — 94640 AIRWAY INHALATION TREATMENT: CPT

## 2020-02-17 RX ORDER — SODIUM CHLORIDE 0.9 % (FLUSH) 0.9 %
3 SYRINGE (ML) INJECTION EVERY 12 HOURS SCHEDULED
Status: DISCONTINUED | OUTPATIENT
Start: 2020-02-17 | End: 2020-02-17 | Stop reason: HOSPADM

## 2020-02-17 RX ORDER — IPRATROPIUM BROMIDE AND ALBUTEROL SULFATE 2.5; .5 MG/3ML; MG/3ML
3 SOLUTION RESPIRATORY (INHALATION)
Status: DISCONTINUED | OUTPATIENT
Start: 2020-02-17 | End: 2020-02-17 | Stop reason: HOSPADM

## 2020-02-17 RX ORDER — MAGNESIUM HYDROXIDE 1200 MG/15ML
LIQUID ORAL AS NEEDED
Status: DISCONTINUED | OUTPATIENT
Start: 2020-02-17 | End: 2020-02-17 | Stop reason: HOSPADM

## 2020-02-17 RX ORDER — ONDANSETRON 2 MG/ML
4 INJECTION INTRAMUSCULAR; INTRAVENOUS ONCE AS NEEDED
Status: CANCELLED | OUTPATIENT
Start: 2020-02-17 | End: 2020-02-17

## 2020-02-17 RX ORDER — LIDOCAINE HYDROCHLORIDE 20 MG/ML
INJECTION, SOLUTION INTRAVENOUS AS NEEDED
Status: DISCONTINUED | OUTPATIENT
Start: 2020-02-17 | End: 2020-02-17 | Stop reason: SURG

## 2020-02-17 RX ORDER — SODIUM CHLORIDE, SODIUM LACTATE, POTASSIUM CHLORIDE, CALCIUM CHLORIDE 600; 310; 30; 20 MG/100ML; MG/100ML; MG/100ML; MG/100ML
50 INJECTION, SOLUTION INTRAVENOUS CONTINUOUS
Status: DISCONTINUED | OUTPATIENT
Start: 2020-02-17 | End: 2020-02-17 | Stop reason: HOSPADM

## 2020-02-17 RX ORDER — PROPOFOL 10 MG/ML
VIAL (ML) INTRAVENOUS AS NEEDED
Status: DISCONTINUED | OUTPATIENT
Start: 2020-02-17 | End: 2020-02-17 | Stop reason: SURG

## 2020-02-17 RX ORDER — KETAMINE HYDROCHLORIDE 50 MG/ML
INJECTION, SOLUTION, CONCENTRATE INTRAMUSCULAR; INTRAVENOUS AS NEEDED
Status: DISCONTINUED | OUTPATIENT
Start: 2020-02-17 | End: 2020-02-17 | Stop reason: SURG

## 2020-02-17 RX ORDER — SODIUM CHLORIDE 0.9 % (FLUSH) 0.9 %
10 SYRINGE (ML) INJECTION AS NEEDED
Status: DISCONTINUED | OUTPATIENT
Start: 2020-02-17 | End: 2020-02-17 | Stop reason: HOSPADM

## 2020-02-17 RX ADMIN — SODIUM CHLORIDE, POTASSIUM CHLORIDE, SODIUM LACTATE AND CALCIUM CHLORIDE: 600; 310; 30; 20 INJECTION, SOLUTION INTRAVENOUS at 12:52

## 2020-02-17 RX ADMIN — PROPOFOL 300 MG: 10 INJECTION, EMULSION INTRAVENOUS at 13:47

## 2020-02-17 RX ADMIN — LIDOCAINE HYDROCHLORIDE 100 MG: 20 INJECTION, SOLUTION INTRAVENOUS at 13:00

## 2020-02-17 RX ADMIN — IPRATROPIUM BROMIDE AND ALBUTEROL SULFATE 3 ML: .5; 3 SOLUTION RESPIRATORY (INHALATION) at 11:23

## 2020-02-17 RX ADMIN — KETAMINE HYDROCHLORIDE 50 MG: 50 INJECTION, SOLUTION INTRAMUSCULAR; INTRAVENOUS at 13:00

## 2020-02-17 NOTE — ANESTHESIA PREPROCEDURE EVALUATION
Anesthesia Evaluation     Patient summary reviewed and Nursing notes reviewed   no history of anesthetic complications:  NPO Solid Status: > 8 hours  NPO Liquid Status: > 8 hours           Airway   Mallampati: II  TM distance: >3 FB  Neck ROM: full  no difficulty expected  Dental - normal exam     Pulmonary - normal exam   (+) a smoker Current Smoked day of surgery, asthma,  Cardiovascular - normal exam    ECG reviewed  Rhythm: regular  Rate: normal    (+) hypertension 2 medications or greater, valvular problems/murmurs murmur, hyperlipidemia,       Neuro/Psych  (+) headaches, psychiatric history Anxiety and Depression,     GI/Hepatic/Renal/Endo    (+) obesity,  PUD,  diabetes mellitus using insulin,     Musculoskeletal     Abdominal    Substance History - negative use     OB/GYN negative ob/gyn ROS         Other   arthritis,    history of cancer    ROS/Med Hx Other: · Left ventricular systolic function is hyperdynamic (EF > 70).  · There is calcification of the aortic valve.                Anesthesia Plan    ASA 3     MAC   (Pt told that intravenous sedation will be used as the primary anesthetic along with local anesthesia if necessary. Every effort will be made to make sure the patient is comfortable.     The patient was told they may or may not have recall for the procedure. It was further explained that if the MAC was not adequate that a general anesthetic with either an LMA or endotracheal tube would be required.     Will proceed with the plan of care.)  intravenous induction     Anesthetic plan, all risks, benefits, and alternatives have been provided, discussed and informed consent has been obtained with: patient.

## 2020-02-17 NOTE — ANESTHESIA POSTPROCEDURE EVALUATION
Patient: Katharine Fitzpatrick    Procedure Summary     Date:  02/17/20 Room / Location:  [facility] ENDOSCOPY 3 / [facility] ENDOSCOPY    Anesthesia Start:  1252 Anesthesia Stop:  1353    Procedure:  COLONOSCOPY WITH COLD FORCEP POLYPECTOMY (N/A ) Diagnosis:       Encounter for colonoscopy due to history of adenomatous colonic polyps      (Encounter for colonoscopy due to history of adenomatous colonic polyps [Z12.11, Z86.010])    Surgeon:  Belkis Peraza MD Provider:  Alexander Mary CRNA    Anesthesia Type:  MAC ASA Status:  3          Anesthesia Type: MAC    Vitals  Vitals Value Taken Time   /60 2/17/2020  2:25 PM   Temp 97.7 °F (36.5 °C) 2/17/2020  1:55 PM   Pulse 64 2/17/2020  2:25 PM   Resp 18 2/17/2020  2:25 PM   SpO2 96 % 2/17/2020  2:25 PM           Post Anesthesia Care and Evaluation    Patient location during evaluation: PHASE II  Patient participation: complete - patient participated  Level of consciousness: awake  Pain score: 1  Pain management: adequate  Airway patency: patent  Anesthetic complications: No anesthetic complications  PONV Status: controlled  Cardiovascular status: acceptable and stable  Respiratory status: acceptable  Hydration status: acceptable

## 2020-02-20 LAB
LAB AP CASE REPORT: NORMAL
PATH REPORT.FINAL DX SPEC: NORMAL

## 2020-03-02 RX ORDER — NYSTATIN AND TRIAMCINOLONE ACETONIDE 100000; 1 [USP'U]/G; MG/G
OINTMENT TOPICAL 2 TIMES DAILY
Qty: 45 G | Refills: 3 | Status: SHIPPED | OUTPATIENT
Start: 2020-03-02 | End: 2021-03-10

## 2020-04-24 ENCOUNTER — HOSPITAL ENCOUNTER (EMERGENCY)
Facility: HOSPITAL | Age: 75
Discharge: HOME | End: 2020-04-24
Attending: EMERGENCY MEDICINE | Admitting: EMERGENCY MEDICINE

## 2020-04-24 ENCOUNTER — APPOINTMENT (OUTPATIENT)
Dept: GENERAL RADIOLOGY | Facility: HOSPITAL | Age: 75
End: 2020-04-24

## 2020-04-24 VITALS
DIASTOLIC BLOOD PRESSURE: 96 MMHG | HEART RATE: 58 BPM | RESPIRATION RATE: 20 BRPM | HEIGHT: 66 IN | TEMPERATURE: 98.4 F | OXYGEN SATURATION: 93 % | WEIGHT: 165 LBS | SYSTOLIC BLOOD PRESSURE: 145 MMHG | BODY MASS INDEX: 26.52 KG/M2

## 2020-04-24 DIAGNOSIS — J44.1 COPD EXACERBATION (HCC): ICD-10-CM

## 2020-04-24 DIAGNOSIS — R09.02 HYPOXIA: Primary | ICD-10-CM

## 2020-04-24 LAB
A-A DO2: 34.1 MMHG
ALBUMIN SERPL-MCNC: 4.2 G/DL (ref 3.5–5.2)
ALBUMIN/GLOB SERPL: 1.1 G/DL
ALP SERPL-CCNC: 83 U/L (ref 39–117)
ALT SERPL W P-5'-P-CCNC: 18 U/L (ref 1–33)
ANION GAP SERPL CALCULATED.3IONS-SCNC: 13.2 MMOL/L (ref 5–15)
ARTERIAL PATENCY WRIST A: POSITIVE
AST SERPL-CCNC: 22 U/L (ref 1–32)
ATMOSPHERIC PRESS: 730 MMHG
BASE EXCESS BLDA CALC-SCNC: 3.9 MMOL/L (ref 0–2)
BASOPHILS # BLD AUTO: 0.17 10*3/MM3 (ref 0–0.2)
BASOPHILS NFR BLD AUTO: 1.5 % (ref 0–1.5)
BDY SITE: ABNORMAL
BILIRUB SERPL-MCNC: 0.2 MG/DL (ref 0.2–1.2)
BUN BLD-MCNC: 14 MG/DL (ref 8–23)
BUN/CREAT SERPL: 19.2 (ref 7–25)
CALCIUM SPEC-SCNC: 9.5 MG/DL (ref 8.6–10.5)
CHLORIDE SERPL-SCNC: 95 MMOL/L (ref 98–107)
CO2 SERPL-SCNC: 28.8 MMOL/L (ref 22–29)
COHGB MFR BLD: 4.3 % (ref 0–2)
CREAT BLD-MCNC: 0.73 MG/DL (ref 0.57–1)
DEPRECATED RDW RBC AUTO: 40.2 FL (ref 37–54)
EOSINOPHIL # BLD AUTO: 0.36 10*3/MM3 (ref 0–0.4)
EOSINOPHIL NFR BLD AUTO: 3.2 % (ref 0.3–6.2)
ERYTHROCYTE [DISTWIDTH] IN BLOOD BY AUTOMATED COUNT: 12.1 % (ref 12.3–15.4)
GFR SERPL CREATININE-BSD FRML MDRD: 78 ML/MIN/1.73
GLOBULIN UR ELPH-MCNC: 3.8 GM/DL
GLUCOSE BLD-MCNC: 151 MG/DL (ref 65–99)
HCO3 BLDA-SCNC: 30.4 MMOL/L (ref 22–28)
HCT VFR BLD AUTO: 47.6 % (ref 34–46.6)
HCT VFR BLD CALC: 47.6 %
HGB BLD-MCNC: 16 G/DL (ref 12–15.9)
HGB BLDA-MCNC: 15.5 G/DL (ref 12–18)
HOLD SPECIMEN: NORMAL
HOLD SPECIMEN: NORMAL
HOROWITZ INDEX BLD+IHG-RTO: 21 %
IMM GRANULOCYTES # BLD AUTO: 0.03 10*3/MM3 (ref 0–0.05)
IMM GRANULOCYTES NFR BLD AUTO: 0.3 % (ref 0–0.5)
LYMPHOCYTES # BLD AUTO: 2.59 10*3/MM3 (ref 0.7–3.1)
LYMPHOCYTES NFR BLD AUTO: 23.3 % (ref 19.6–45.3)
MCH RBC QN AUTO: 30.7 PG (ref 26.6–33)
MCHC RBC AUTO-ENTMCNC: 33.6 G/DL (ref 31.5–35.7)
MCV RBC AUTO: 91.4 FL (ref 79–97)
METHGB BLD QL: 0.8 % (ref 0–1.5)
MODALITY: ABNORMAL
MONOCYTES # BLD AUTO: 1.21 10*3/MM3 (ref 0.1–0.9)
MONOCYTES NFR BLD AUTO: 10.9 % (ref 5–12)
NEUTROPHILS # BLD AUTO: 6.77 10*3/MM3 (ref 1.7–7)
NEUTROPHILS NFR BLD AUTO: 60.8 % (ref 42.7–76)
NOTE: ABNORMAL
NRBC BLD AUTO-RTO: 0 /100 WBC (ref 0–0.2)
NT-PROBNP SERPL-MCNC: 475.7 PG/ML (ref 5–1800)
OXYHGB MFR BLDV: 82.8 % (ref 94–99)
PCO2 BLDA: 51.8 MM HG (ref 35–45)
PCO2 TEMP ADJ BLD: ABNORMAL MM[HG]
PH BLDA: 7.38 PH UNITS (ref 7.3–7.5)
PH, TEMP CORRECTED: ABNORMAL
PLATELET # BLD AUTO: 378 10*3/MM3 (ref 140–450)
PMV BLD AUTO: 8.7 FL (ref 6–12)
PO2 BLDA: 50.9 MM HG (ref 75–100)
PO2 TEMP ADJ BLD: ABNORMAL MM[HG]
POTASSIUM BLD-SCNC: 4.5 MMOL/L (ref 3.5–5.2)
PROT SERPL-MCNC: 8 G/DL (ref 6–8.5)
RBC # BLD AUTO: 5.21 10*6/MM3 (ref 3.77–5.28)
SAO2 % BLDCOA: 87.3 % (ref 94–100)
SODIUM BLD-SCNC: 137 MMOL/L (ref 136–145)
TROPONIN T SERPL-MCNC: <0.01 NG/ML (ref 0–0.03)
VENTILATOR MODE: ABNORMAL
WBC NRBC COR # BLD: 11.13 10*3/MM3 (ref 3.4–10.8)
WHOLE BLOOD HOLD SPECIMEN: NORMAL
WHOLE BLOOD HOLD SPECIMEN: NORMAL

## 2020-04-24 PROCEDURE — 25010000002 MAGNESIUM SULFATE 2 GM/50ML SOLUTION: Performed by: PHYSICIAN ASSISTANT

## 2020-04-24 PROCEDURE — 94799 UNLISTED PULMONARY SVC/PX: CPT

## 2020-04-24 PROCEDURE — 80053 COMPREHEN METABOLIC PANEL: CPT

## 2020-04-24 PROCEDURE — 96375 TX/PRO/DX INJ NEW DRUG ADDON: CPT

## 2020-04-24 PROCEDURE — 93005 ELECTROCARDIOGRAM TRACING: CPT

## 2020-04-24 PROCEDURE — 25010000002 METHYLPREDNISOLONE PER 125 MG: Performed by: PHYSICIAN ASSISTANT

## 2020-04-24 PROCEDURE — 82375 ASSAY CARBOXYHB QUANT: CPT

## 2020-04-24 PROCEDURE — 84484 ASSAY OF TROPONIN QUANT: CPT

## 2020-04-24 PROCEDURE — 96365 THER/PROPH/DIAG IV INF INIT: CPT

## 2020-04-24 PROCEDURE — 82805 BLOOD GASES W/O2 SATURATION: CPT

## 2020-04-24 PROCEDURE — 36600 WITHDRAWAL OF ARTERIAL BLOOD: CPT

## 2020-04-24 PROCEDURE — 99284 EMERGENCY DEPT VISIT MOD MDM: CPT

## 2020-04-24 PROCEDURE — 87635 SARS-COV-2 COVID-19 AMP PRB: CPT | Performed by: PHYSICIAN ASSISTANT

## 2020-04-24 PROCEDURE — 25010000002 ONDANSETRON PER 1 MG: Performed by: PHYSICIAN ASSISTANT

## 2020-04-24 PROCEDURE — 85025 COMPLETE CBC W/AUTO DIFF WBC: CPT

## 2020-04-24 PROCEDURE — 83880 ASSAY OF NATRIURETIC PEPTIDE: CPT

## 2020-04-24 PROCEDURE — 83050 HGB METHEMOGLOBIN QUAN: CPT

## 2020-04-24 PROCEDURE — 71045 X-RAY EXAM CHEST 1 VIEW: CPT

## 2020-04-24 RX ORDER — ALBUTEROL SULFATE 90 UG/1
2 AEROSOL, METERED RESPIRATORY (INHALATION) EVERY 6 HOURS PRN
Qty: 1 INHALER | Refills: 0 | Status: SHIPPED | OUTPATIENT
Start: 2020-04-24 | End: 2020-07-01

## 2020-04-24 RX ORDER — MAGNESIUM SULFATE HEPTAHYDRATE 40 MG/ML
2 INJECTION, SOLUTION INTRAVENOUS ONCE
Status: COMPLETED | OUTPATIENT
Start: 2020-04-24 | End: 2020-04-24

## 2020-04-24 RX ORDER — ONDANSETRON 2 MG/ML
4 INJECTION INTRAMUSCULAR; INTRAVENOUS ONCE
Status: COMPLETED | OUTPATIENT
Start: 2020-04-24 | End: 2020-04-24

## 2020-04-24 RX ORDER — SODIUM CHLORIDE 0.9 % (FLUSH) 0.9 %
10 SYRINGE (ML) INJECTION AS NEEDED
Status: DISCONTINUED | OUTPATIENT
Start: 2020-04-24 | End: 2020-04-24 | Stop reason: HOSPADM

## 2020-04-24 RX ORDER — PREDNISONE 20 MG/1
40 TABLET ORAL DAILY
Qty: 10 TABLET | Refills: 0 | Status: SHIPPED | OUTPATIENT
Start: 2020-04-24 | End: 2020-04-29

## 2020-04-24 RX ORDER — METHYLPREDNISOLONE SODIUM SUCCINATE 125 MG/2ML
125 INJECTION, POWDER, LYOPHILIZED, FOR SOLUTION INTRAMUSCULAR; INTRAVENOUS ONCE
Status: COMPLETED | OUTPATIENT
Start: 2020-04-24 | End: 2020-04-24

## 2020-04-24 RX ADMIN — MAGNESIUM SULFATE IN WATER 2 G: 40 INJECTION, SOLUTION INTRAVENOUS at 14:31

## 2020-04-24 RX ADMIN — ONDANSETRON 4 MG: 2 INJECTION INTRAMUSCULAR; INTRAVENOUS at 16:13

## 2020-04-24 RX ADMIN — ALBUTEROL SULFATE 4 MG: 2 SYRUP ORAL at 14:28

## 2020-04-24 RX ADMIN — METHYLPREDNISOLONE SODIUM SUCCINATE 125 MG: 125 INJECTION, POWDER, FOR SOLUTION INTRAMUSCULAR; INTRAVENOUS at 14:29

## 2020-04-24 NOTE — DISCHARGE INSTRUCTIONS
Please use your home oxygen as directed.  Call Dr. Elder's office as soon as possible to schedule follow-up visit.  Return to the ER for any worsening symptoms.    Please self quarantine at home.  We will call you with your COVID-19 test results when they are available.  If you must leave your house please practice social distancing.  If you develop any worsening symptoms please return to the ER for repeat evaluation.

## 2020-04-24 NOTE — ED PROVIDER NOTES
"Subjective   This patient states since yesterday morning she has had right-sided chest pain worse with cough, deep breath and movement.  She is a daily smoker.  She states she has been on 2 different antibiotics over the past several weeks for a \"cold\" ordered for her by her PCP without no chest x-rays were performed.  No fever or chills.  She states she does have a history of mild asthma.  She does not use supplemental oxygen at home.  She states her sputum is yellow/white without blood.  She states she believes she may have pleurisy          Review of Systems   Constitutional: Negative.    HENT: Negative.    Eyes: Negative.    Respiratory: Positive for cough and shortness of breath.    Cardiovascular: Positive for chest pain.   Gastrointestinal: Negative.    Genitourinary: Negative.    Musculoskeletal: Negative.    Skin: Negative.    Neurological: Negative.    Psychiatric/Behavioral: Negative.        Past Medical History:   Diagnosis Date   • Anesthesia 02/14/2020    Patient reported she is slow to wake after anesthesia   • Anxiety    • Asthma    • Body piercing     both ears   • Cataract, bilateral     s/p surgery   • Colon polyp     adenomatous polyps   • Depression    • Diabetes mellitus (CMS/HCC)    • Elevated cholesterol    • Headache    • Heart murmur    • History of chest pain 02/14/2020    Patient reported \"they think it's nerves\" and reported last episode was 6-7 months ago   • Hyperlipidemia    • Hypertension    • Peptic ulceration    • RA (rheumatoid arthritis) (CMS/HCC)    • Skin cancer     face   • Urinary tract infection    • Wears dentures     full set   • Wears glasses        Allergies   Allergen Reactions   • Penicillins Rash       Past Surgical History:   Procedure Laterality Date   • ABDOMINAL HYSTERECTOMY  2007   • APPENDECTOMY     • COLONOSCOPY  2015?   • COLONOSCOPY N/A 2/17/2020    Procedure: COLONOSCOPY WITH COLD FORCEP POLYPECTOMY;  Surgeon: Belkis Peraza MD;  Location: [facility]" ENDOSCOPY;  Service: Gastroenterology;  Laterality: N/A;   • COLONOSCOPY W/ POLYPECTOMY     • LAPAROSCOPIC CHOLECYSTECTOMY  2010   • MOUTH SURGERY      full extraction of teeth   • SALPINGO OOPHORECTOMY Bilateral 2009   • SKIN BIOPSY     • SPINAL FUSION  2002       Family History   Problem Relation Age of Onset   • Uterine cancer Mother         widely metastatic   • Breast cancer Mother    • Heart attack Father        Social History     Socioeconomic History   • Marital status:      Spouse name: Not on file   • Number of children: Not on file   • Years of education: Not on file   • Highest education level: Not on file   Tobacco Use   • Smoking status: Current Every Day Smoker     Packs/day: 0.50     Years: 56.00     Pack years: 28.00     Types: Cigarettes   • Smokeless tobacco: Never Used   Substance and Sexual Activity   • Alcohol use: No   • Drug use: No   • Sexual activity: Defer           Objective   Physical Exam   Constitutional: She is oriented to person, place, and time. She appears well-developed and well-nourished.   HENT:   Head: Normocephalic and atraumatic.   Eyes: EOM are normal.   Neck: Normal range of motion.   Cardiovascular: Normal rate, regular rhythm and normal heart sounds.   Pulmonary/Chest: Effort normal. She has decreased breath sounds. She has wheezes. She has no rhonchi. She has no rales.   Abdominal: Soft.   Musculoskeletal: Normal range of motion.        Right lower leg: Normal. She exhibits no edema.        Left lower leg: Normal. She exhibits no edema.   Neurological: She is alert and oriented to person, place, and time.   Skin: Skin is warm and dry.   Psychiatric: She has a normal mood and affect.   Nursing note and vitals reviewed.      Procedures           ED Course  ED Course as of Apr 24 1555   Fri Apr 24, 2020   1358 WBC(!): 11.13 [TM]   1441 EKG interpreted by me sinus bradycardia with old left bundle branch block compared to 2019.  Rate 57    [PF]      ED Course User  Index  [PF] [place] W, DO  [TM] Pepe Najera PA-C                                           [facility]  1440  After approximately 5 minutes of supplemental oxygen being turned off patient's oxygen saturations 88% on room air lying in bed.  Will obtain ABG shortly.    1528  Patient states she feels immensely better, saturating mid 90s on 2 L nasal cannula.  States her chest pain has resolved.  I spoke with Damián Bolivar with [employer] [phone] he will be to the ER shortly to bring the patient a portable oxygen device for home use.  Written order for home oxygen and a copy of the patient's chart with diagnosis provided to him.  Patient does not want to be admitted to the facility due to risk for exposure to COVID-19.  Her lowest oxygenation on room air at rest was documented 86%.  She wishes to go home and will follow-up outpatient and return if worse.    1553  Patient complains of mild nausea after nasopharyngeal swab for COVID-19.  Will give 1 dose of Zofran.    Evaluation, management, disposition and decisions made in context of COVID-19 pandemic.  In this patient, the increased risk of nosocomial infection is a particular concern.  In my judgment the balance of clinical factors dictate expedited evaluation and discharge from the ED in the interest of this patient's health and safety.     I discussed with the patient/family that given their symptoms the current recommendation is to self quarantine in their place of residence until COVID-19 test results are back.  They understand if they must leave their home they must practice social distancing. They understand to return to the ER for any worsening symptoms.      Final diagnoses:   Hypoxia   COPD exacerbation (CMS/[facility])            Pepe Najera PA-C  04/24/20 1532       Pepe Najera PA-C  04/24/20 1549       Pepe Najera PA-C  04/24/20 1554       Pepe Najera PA-C  04/24/20 1555

## 2020-04-25 LAB — SARS-COV-2 RNA RESP QL NAA+PROBE: NOT DETECTED

## 2020-04-27 ENCOUNTER — TELEPHONE (OUTPATIENT)
Dept: PREADMISSION TESTING | Facility: HOSPITAL | Age: 75
End: 2020-04-27

## 2020-04-27 ENCOUNTER — EPISODE CHANGES (OUTPATIENT)
Dept: CASE MANAGEMENT | Facility: OTHER | Age: 75
End: 2020-04-27

## 2020-04-27 NOTE — TELEPHONE ENCOUNTER
Called to inform pt regarding negative COVID19 test results, but was unable to.  Left message for pt to call me back.

## 2020-04-27 NOTE — TELEPHONE ENCOUNTER
"Pt returned my call.  Informed regarding recent COVID19 negative test result.  When I asked pt if she was feeling better, she stated, \"no, about the same.\"  Instructed to f/u with PCP.  Verbalized understanding.  "

## 2020-05-03 ENCOUNTER — EPISODE CHANGES (OUTPATIENT)
Dept: CASE MANAGEMENT | Facility: OTHER | Age: 75
End: 2020-05-03

## 2020-06-19 ENCOUNTER — TRANSCRIBE ORDERS (OUTPATIENT)
Dept: ADMINISTRATIVE | Facility: HOSPITAL | Age: 75
End: 2020-06-19

## 2020-06-19 DIAGNOSIS — R10.9 ABDOMINAL PAIN, UNSPECIFIED ABDOMINAL LOCATION: Primary | ICD-10-CM

## 2020-06-26 ENCOUNTER — HOSPITAL ENCOUNTER (OUTPATIENT)
Dept: CT IMAGING | Facility: HOSPITAL | Age: 75
Discharge: HOME | End: 2020-06-26
Admitting: INTERNAL MEDICINE

## 2020-06-26 DIAGNOSIS — R10.9 ABDOMINAL PAIN, UNSPECIFIED ABDOMINAL LOCATION: ICD-10-CM

## 2020-06-26 LAB — CREAT BLDA-MCNC: 0.7 MG/DL (ref 0.6–1.3)

## 2020-06-26 PROCEDURE — 0 DIATRIZOATE MEGLUMINE & SODIUM PER 1 ML: Performed by: INTERNAL MEDICINE

## 2020-06-26 PROCEDURE — 82565 ASSAY OF CREATININE: CPT

## 2020-06-26 PROCEDURE — 74177 CT ABD & PELVIS W/CONTRAST: CPT

## 2020-06-26 RX ADMIN — DIATRIZOATE MEGLUMINE AND DIATRIZOATE SODIUM 30 ML: 660; 100 LIQUID ORAL; RECTAL at 14:52

## 2020-07-01 ENCOUNTER — TELEPHONE (OUTPATIENT)
Dept: GASTROENTEROLOGY | Facility: CLINIC | Age: 75
End: 2020-07-01

## 2020-07-01 ENCOUNTER — OFFICE VISIT (OUTPATIENT)
Dept: GASTROENTEROLOGY | Facility: CLINIC | Age: 75
End: 2020-07-01

## 2020-07-01 VITALS
BODY MASS INDEX: 29.73 KG/M2 | OXYGEN SATURATION: 92 % | SYSTOLIC BLOOD PRESSURE: 148 MMHG | WEIGHT: 185 LBS | HEIGHT: 66 IN | RESPIRATION RATE: 18 BRPM | HEART RATE: 68 BPM | DIASTOLIC BLOOD PRESSURE: 90 MMHG | TEMPERATURE: 97.1 F

## 2020-07-01 DIAGNOSIS — Z80.0 FAMILY HX OF COLON CANCER: ICD-10-CM

## 2020-07-01 DIAGNOSIS — Z01.818 PREOP TESTING: Primary | ICD-10-CM

## 2020-07-01 DIAGNOSIS — K92.1 MELENA: ICD-10-CM

## 2020-07-01 DIAGNOSIS — R11.0 NAUSEA: Primary | ICD-10-CM

## 2020-07-01 DIAGNOSIS — K57.30 DIVERTICULOSIS OF LARGE INTESTINE WITHOUT HEMORRHAGE: ICD-10-CM

## 2020-07-01 DIAGNOSIS — Z12.11 ENCOUNTER FOR SCREENING FOR MALIGNANT NEOPLASM OF COLON: ICD-10-CM

## 2020-07-01 DIAGNOSIS — K59.04 CHRONIC IDIOPATHIC CONSTIPATION: ICD-10-CM

## 2020-07-01 PROCEDURE — 99204 OFFICE O/P NEW MOD 45 MIN: CPT | Performed by: INTERNAL MEDICINE

## 2020-07-01 RX ORDER — SENNA PLUS 8.6 MG/1
1 TABLET ORAL NIGHTLY
Qty: 30 TABLET | Refills: 2 | Status: SHIPPED | OUTPATIENT
Start: 2020-07-01 | End: 2020-12-23

## 2020-07-01 RX ORDER — DEXLANSOPRAZOLE 60 MG/1
60 CAPSULE, DELAYED RELEASE ORAL DAILY
Qty: 30 CAPSULE | Refills: 2 | Status: SHIPPED | OUTPATIENT
Start: 2020-07-01 | End: 2020-12-23

## 2020-07-01 RX ORDER — PROMETHAZINE HYDROCHLORIDE 12.5 MG/1
12.5 TABLET ORAL 3 TIMES DAILY PRN
Qty: 30 TABLET | Refills: 0 | Status: SHIPPED | OUTPATIENT
Start: 2020-07-01 | End: 2020-12-23

## 2020-07-01 RX ORDER — SODIUM CHLORIDE 9 MG/ML
70 INJECTION, SOLUTION INTRAVENOUS CONTINUOUS PRN
Status: CANCELLED | OUTPATIENT
Start: 2020-07-01

## 2020-07-01 NOTE — PROGRESS NOTES
"     New Patient Consult      Date: 2020   Patient Name: Katharine Fitzpatrick  MRN: [id number]  : 1945     Referring Physician: Murtaza Elder MD    Chief Complaint   Patient presents with   • Nausea       History of Present Illness: Katharine Fitzpatrick is a 75 y.o. female who is here today to establish care with Gastroenterology for evaluation of nausea.     The patient has history of recurrent nausea for the last month or so.  The nausea is described as mild to moderate all the time now. No vomiting.  Eating worsens the symptoms however no definite relieving factors of nausea. She states that she is taking some medicine that is not working. It is not recorded.   There is no associated abdominal distension or vomiting.  She also had an episode of black stool about 2 weeks ago and saw black stool few times since then. No associated abdominal pain.    There is no history of acid reflux. Pt denies any odynophagia or dysphagia.  She has chronic constipation and pass hard stool once in few days. She has to strain a lot.  There is no history of liver or pancreatic disease. There is no history of anemia. No prior history of EGD. Last colonoscopy was in Feb by Dr Peraza and showed colonic diverticulosis and hyperplastic polyp. Family history of colon cancer- Uncle had colon CA at 45years. No history of any abdominal surgery. Denies alcohol abuse. She is chronic smoker.   She is diabetic on insulin    Subjective      Past Medical History:   Past Medical History:   Diagnosis Date   • Anesthesia 2020    Patient reported she is slow to wake after anesthesia   • Anxiety    • Asthma    • Body piercing     both ears   • Cataract, bilateral     s/p surgery   • Colon polyp     adenomatous polyps   • Depression    • Diabetes mellitus (CMS/HCC)    • Elevated cholesterol    • Headache    • Heart murmur    • History of chest pain 2020    Patient reported \"they think it's nerves\" and reported last episode was " 6-7 months ago   • Hyperlipidemia    • Hypertension    • Peptic ulceration    • RA (rheumatoid arthritis) (CMS/HCC)    • Skin cancer     face   • Urinary tract infection    • Wears dentures     full set   • Wears glasses        Past Surgical History:   Past Surgical History:   Procedure Laterality Date   • ABDOMINAL HYSTERECTOMY  2007   • APPENDECTOMY     • COLONOSCOPY  2015?   • COLONOSCOPY N/A 2/17/2020    Procedure: COLONOSCOPY WITH COLD FORCEP POLYPECTOMY;  Surgeon: Belkis Peraza MD;  Location: [facility] ENDOSCOPY;  Service: Gastroenterology;  Laterality: N/A;   • COLONOSCOPY W/ POLYPECTOMY     • LAPAROSCOPIC CHOLECYSTECTOMY  2010   • MOUTH SURGERY      full extraction of teeth   • SALPINGO OOPHORECTOMY Bilateral 2009   • SKIN BIOPSY     • SPINAL FUSION  2002       Family History:   Family History   Problem Relation Age of Onset   • Uterine cancer Mother         widely metastatic   • Breast cancer Mother    • Heart attack Father        Social History:   Social History     Socioeconomic History   • Marital status:      Spouse name: Not on file   • Number of children: Not on file   • Years of education: Not on file   • Highest education level: Not on file   Tobacco Use   • Smoking status: Current Every Day Smoker     Packs/day: 0.50     Years: 56.00     Pack years: 28.00     Types: Cigarettes   • Smokeless tobacco: Never Used   Substance and Sexual Activity   • Alcohol use: No   • Drug use: No   • Sexual activity: Defer         Current Outpatient Medications:   •  amLODIPine (NORVASC) 5 MG tablet, Take 10 mg by mouth Daily., Disp: , Rfl:   •  fenofibrate 160 MG tablet, Take 160 mg by mouth Daily., Disp: , Rfl:   •  fluticasone-salmeterol (ADVAIR DISKUS) 250-50 MCG/DOSE DISKUS, Inhale 1 puff 2 (Two) Times a Day., Disp: 60 each, Rfl: 5  •  hydrOXYzine (VISTARIL) 50 MG capsule, Take 1 capsule by mouth 3 (Three) Times a Day As Needed for Itching., Disp: 30 capsule, Rfl: 0  •  insulin aspart (novoLOG FLEXPEN)  100 UNIT/ML solution pen-injector sc pen, Inject  under the skin into the appropriate area as directed 3 (Three) Times a Day With Meals. SSI, Disp: , Rfl:   •  Insulin Glargine (BASAGLAR KWIKPEN) 100 UNIT/ML injection pen, Inject 10 Units under the skin into the appropriate area as directed Every Night., Disp: , Rfl:   •  isosorbide dinitrate (ISORDIL) 30 MG tablet, Take 30 mg by mouth 4 (Four) Times a Day., Disp: , Rfl:   •  LORazepam (ATIVAN) 1 MG tablet, Take 2 mg by mouth 2 (Two) Times a Day. Patient takes 2 tablets 2 times a day., Disp: , Rfl:   •  losartan (COZAAR) 50 MG tablet, Take 1 tablet by mouth Daily., Disp: 90 tablet, Rfl: 3  •  methocarbamol (ROBAXIN) 500 MG tablet, Take 1,000 mg by mouth 2 (Two) Times a Day. Patient takes 2 tablets 2 times a day., Disp: , Rfl:   •  metoprolol tartrate (LOPRESSOR) 50 MG tablet, Take 1 tablet by mouth 2 (Two) Times a Day., Disp: 180 tablet, Rfl: 3  •  nitrofurantoin (MACRODANTIN) 100 MG capsule, Take 1 capsule by mouth Daily., Disp: 30 capsule, Rfl: 5  •  nitroglycerin (NITROSTAT) 0.4 MG SL tablet, 1 under the tongue as needed for angina, may repeat q5mins for up three doses (Patient taking differently: Place 0.4 mg under the tongue Every 5 (Five) Minutes As Needed (pt states has never taken this). 1 under the tongue as needed for angina, may repeat q5mins for up three doses), Disp: 100 tablet, Rfl: 11  •  nystatin-triamcinolone (MYCOLOG) 794149-9.1 UNIT/GM-% ointment, Apply  topically to the appropriate area as directed 2 (Two) Times a Day. Apply to intertriginous folds and external genitalia as needed, Disp: 45 g, Rfl: 3  •  rosuvastatin (CRESTOR) 10 MG tablet, Take 10 mg by mouth Daily., Disp: , Rfl:   •  venlafaxine XR (EFFEXOR-XR) 150 MG 24 hr capsule, TAKE 1 CAPSULE TWICE DAILY (Patient taking differently: Take 150 mg by mouth 2 (Two) Times a Day.), Disp: 180 capsule, Rfl: 3  •  dexlansoprazole (DEXILANT) 60 MG capsule, Take 1 capsule by mouth Daily., Disp: 30  "capsule, Rfl: 2  •  promethazine (PHENERGAN) 12.5 MG tablet, Take 1 tablet by mouth 3 (Three) Times a Day As Needed for Nausea or Vomiting., Disp: 30 tablet, Rfl: 0  •  senna (senna) 8.6 MG tablet, Take 1 tablet by mouth Every Night., Disp: 30 tablet, Rfl: 2    Allergies   Allergen Reactions   • Penicillins Rash       Review of Systems:   Review of Systems   Constitutional: Negative for appetite change, fatigue, fever and unexpected weight loss.   Respiratory: Negative for cough, shortness of breath and wheezing.    Cardiovascular: Negative for chest pain, palpitations and leg swelling.   Gastrointestinal: Positive for blood in stool, constipation and nausea. Negative for abdominal distention, abdominal pain, anal bleeding, diarrhea, rectal pain, vomiting, GERD and indigestion.   Genitourinary: Negative for dysuria, frequency and hematuria.   Musculoskeletal: Negative for back pain and joint swelling.   Skin: Negative for color change, rash and skin lesions.   Neurological: Negative for dizziness, syncope, speech difficulty, weakness, headache and memory problem.   Hematological: Negative for adenopathy. Does not bruise/bleed easily.   Psychiatric/Behavioral: Negative for agitation, behavioral problems, suicidal ideas and depressed mood.       The following portions of the patient's history were reviewed and updated as appropriate: allergies, current medications, past family history, past medical history, past social history, past surgical history and problem list.    Objective     Physical Exam:  Vital Signs:   Vitals:    07/01/20 1453   BP: 148/90   Pulse: 68   Resp: 18   Temp: 97.1 °F (36.2 °C)   TempSrc: Temporal   SpO2: 92%   Weight: 83.9 kg (185 lb)   Height: 167.6 cm (66\")       Physical Exam   Constitutional: She is oriented to person, place, and time. She appears well-developed and well-nourished.   HENT:   Head: Normocephalic and atraumatic.   Right Ear: External ear normal.   Left Ear: External ear " normal.   Mouth/Throat: Oropharynx is clear and moist.   Eyes: Pupils are equal, round, and reactive to light. Conjunctivae and EOM are normal.   Neck: Normal range of motion. No tracheal deviation present. No thyromegaly present.   Cardiovascular: Normal rate and regular rhythm.   No murmur heard.  Pulmonary/Chest: Effort normal and breath sounds normal. No respiratory distress.   Abdominal: Soft. Bowel sounds are normal. She exhibits no distension and no mass. There is tenderness (Mild tenderness in the epigastric region noted). No hernia.   Musculoskeletal: Normal range of motion. She exhibits no edema.   Neurological: She is alert and oriented to person, place, and time. No cranial nerve deficit or sensory deficit.   Skin: Skin is warm and dry.   Psychiatric: She has a normal mood and affect. Her behavior is normal. Judgment and thought content normal.   Nursing note and vitals reviewed.      Results Review:   I have reviewed the patient's new clinical and imaging results and agree with the interpretation.     Hospital Outpatient Visit on 06/26/2020   Component Date Value Ref Range Status   • Creatinine 06/26/2020 0.70  0.60 - 1.30 mg/dL Final    Serial Number: [id number]:  [id number]   Admission on 04/24/2020, Discharged on 04/24/2020   Component Date Value Ref Range Status   • Glucose 04/24/2020 151* 65 - 99 mg/dL Final   • BUN 04/24/2020 14  8 - 23 mg/dL Final   • Creatinine 04/24/2020 0.73  0.57 - 1.00 mg/dL Final   • Sodium 04/24/2020 137  136 - 145 mmol/L Final   • Potassium 04/24/2020 4.5  3.5 - 5.2 mmol/L Final   • Chloride 04/24/2020 95* 98 - 107 mmol/L Final   • CO2 04/24/2020 28.8  22.0 - 29.0 mmol/L Final   • Calcium 04/24/2020 9.5  8.6 - 10.5 mg/dL Final   • Total Protein 04/24/2020 8.0  6.0 - 8.5 g/dL Final   • Albumin 04/24/2020 4.20  3.50 - 5.20 g/dL Final   • ALT (SGPT) 04/24/2020 18  1 - 33 U/L Final   • AST (SGOT) 04/24/2020 22  1 - 32 U/L Final   • Alkaline Phosphatase 04/24/2020 83  39 - 117  U/L Final   • Total Bilirubin 04/24/2020 0.2  0.2 - 1.2 mg/dL Final   • eGFR Non African Amer 04/24/2020 78  >60 mL/min/1.73 Final   • Globulin 04/24/2020 3.8  gm/dL Final   • A/G Ratio 04/24/2020 1.1  g/dL Final   • BUN/Creatinine Ratio 04/24/2020 19.2  7.0 - 25.0 Final   • Anion Gap 04/24/2020 13.2  5.0 - 15.0 mmol/L Final   • proBNP 04/24/2020 475.7  5.0-1,800.0 pg/mL Final   • Troponin T 04/24/2020 <0.010  0.000 - 0.030 ng/mL Final   • Extra Tube 04/24/2020 hold for add-on   Final    Auto resulted   • Extra Tube 04/24/2020 Hold for add-ons.   Final    Auto resulted.   • Extra Tube 04/24/2020 hold for add-on   Final    Auto resulted   • Extra Tube 04/24/2020 Hold for add-ons.   Final    Auto resulted.   • WBC 04/24/2020 11.13* 3.40 - 10.80 10*3/mm3 Final   • RBC 04/24/2020 5.21  3.77 - 5.28 10*6/mm3 Final   • Hemoglobin 04/24/2020 16.0* 12.0 - 15.9 g/dL Final   • Hematocrit 04/24/2020 47.6* 34.0 - 46.6 % Final   • MCV 04/24/2020 91.4  79.0 - 97.0 fL Final   • MCH 04/24/2020 30.7  26.6 - 33.0 pg Final   • MCHC 04/24/2020 33.6  31.5 - 35.7 g/dL Final   • RDW 04/24/2020 12.1* 12.3 - 15.4 % Final   • RDW-SD 04/24/2020 40.2  37.0 - 54.0 fl Final   • MPV 04/24/2020 8.7  6.0 - 12.0 fL Final   • Platelets 04/24/2020 378  140 - 450 10*3/mm3 Final   • Neutrophil % 04/24/2020 60.8  42.7 - 76.0 % Final   • Lymphocyte % 04/24/2020 23.3  19.6 - 45.3 % Final   • Monocyte % 04/24/2020 10.9  5.0 - 12.0 % Final   • Eosinophil % 04/24/2020 3.2  0.3 - 6.2 % Final   • Basophil % 04/24/2020 1.5  0.0 - 1.5 % Final   • Immature Grans % 04/24/2020 0.3  0.0 - 0.5 % Final   • Neutrophils, Absolute 04/24/2020 6.77  1.70 - 7.00 10*3/mm3 Final   • Lymphocytes, Absolute 04/24/2020 2.59  0.70 - 3.10 10*3/mm3 Final   • Monocytes, Absolute 04/24/2020 1.21* 0.10 - 0.90 10*3/mm3 Final   • Eosinophils, Absolute 04/24/2020 0.36  0.00 - 0.40 10*3/mm3 Final   • Basophils, Absolute 04/24/2020 0.17  0.00 - 0.20 10*3/mm3 Final   • Immature Grans, Absolute  04/24/2020 0.03  0.00 - 0.05 10*3/mm3 Final   • nRBC 04/24/2020 0.0  0.0 - 0.2 /100 WBC Final   • Site 04/24/2020 Left Radial   Final   • Dat's Test 04/24/2020 Positive   Final   • pH, Arterial 04/24/2020 7.377  7.300 - 7.500 pH units Final   • pCO2, Arterial 04/24/2020 51.8* 35.0 - 45.0 mm Hg Final    83 Value above reference range   • pO2, Arterial 04/24/2020 50.9* 75.0 - 100.0 mm Hg Final    85 Value below critical limit   • HCO3, Arterial 04/24/2020 30.4* 22.0 - 28.0 mmol/L Final    83 Value above reference range   • Base Excess, Arterial 04/24/2020 3.9* 0.0 - 2.0 mmol/L Final    83 Value above reference range   • O2 Saturation, Arterial 04/24/2020 87.3* 94.0 - 100.0 % Final    84 Value below reference range   • Hemoglobin, Blood Gas 04/24/2020 15.5  12 - 18 g/dL Final   • Hematocrit, Blood Gas 04/24/2020 47.6  % Final   • Oxyhemoglobin 04/24/2020 82.8* 94 - 99 % Final    84 Value below reference range   • Methemoglobin 04/24/2020 0.80  0.00 - 1.50 % Final   • Carboxyhemoglobin 04/24/2020 4.3* 0 - 2 % Final   • A-a Gradiant 04/24/2020 34.1  mmHg Final   • Barometric Pressure for Blood Gas 04/24/2020 730  mmHg Final   • Modality 04/24/2020 Room Air   Final   • FIO2 04/24/2020 21  % Final   • Ventilator Mode 04/24/2020 NA   Final    Meter: [id number]     :  [id number]   • COVID19 04/24/2020 Not Detected  Not Detected - Ref. Range Final      Ct Abdomen Pelvis With Contrast    Result Date: 6/26/2020  1. Severe sigmoid diverticulosis. Minimal mid sigmoid thickening may just be related to underdistention. Changes of mild diverticulitis are thought to be less likely considerations but should be correlated with the patient's clinical presentation. Short-term follow-up if indicated clinically. 2. Prominent stool burden in the mid to distal colon. 3. Scattered tiny noncalcified bilateral lower lobe pulmonary nodules. These are technically indeterminate but could be followed as indicated per [place]  [employer] criteria. These measure just a few millimeters in size. 4. Probable hepatic cysts. 6. Other chronic appearing findings.     This report was finalized on 6/26/2020 3:56 PM by Gerson Coates MD.    Xr Chest 1 View    Result Date: 4/24/2020  No acute cardiopulmonary process.  Continued followup is recommended.  This report was finalized on 4/24/2020 2:17 PM by Leona Hammonds M.D..      Assessment / Plan      Assessment & Plan:  1. Nausea  Suspected peptic ulcer disease versus chronic constipation related versus gastroparesis to rule out  Persistent nausea for almost 3 to 4 weeks now without any abdominal pain.  However her clinical examination reveals a moderate epigastric tenderness.   She also had a few episodes of black stool recently suspicious for melena.  Her last CBC done in April 2020 was normal 16 g/dL.  She had a recent CT scan abdomen pelvis done which revealed colonic stool burden and colonic diverticulosis without any signs of diverticulitis.   She has significant constipation issue and passes hard stool once in few days.  This could be contributing to her symptoms.  She is diabetic on insulin and possibility for gastroparesis cannot be ruled out    We will get a stat CBC  I have started on Dexilant 60 mg capsule daily p.o.  Phenergan 12.5 mg p.o. 3 times daily as needed  Will schedule for an EGD for further evaluation    The indications, technique, alternatives and potential risk and complications were discussed with the patient including but not limited to bleeding, bowel perforations, missing lesions and anesthetic complications. The patient understands and wishes to proceed with the procedure and has given their verbal consent. Written patient education information was given to the patient.   The patient will call if they have further questions before procedure.     - CBC Auto Differential; Future  - Case Request; Standing  - Implement Anesthesia Orders Day of Procedure; Standing  - Obtain  Informed Consent; Standing  - Verify NPO; Standing  - Oxygen Therapy- Nasal Cannula; 2 LPM; Titrate for SPO2: equal to or greater than, 90%; Standing  - sodium chloride 0.9 % infusion  - Case Request    2. Chronic idiopathic constipation  Longstanding history of chronic constipation.  She is not taking any laxatives or any fiber supplements  She will have a bowel movement once in few days and with a significant strain  She does not want  MiraLAX.   I have advised her to start her on 1 scoop of Metamucil p.o. Daily  I have started her on senna 1 tablet p.o. daily and advised to increase that to 2 tablets p.o. daily if required  We will see how she does with that and titrate up the dose according    3. Melena  Patient history is suspicious for upper GI bleed with melena.  She does have a constant nausea and epigastric tenderness  We will schedule her for an EGD for further evaluation      5. Family hx of colon cancer  Her uncle had a colon cancer at age of 45.  She had a colonoscopy in February 2020 by Dr. Aniya Peraza.   As per report she had a few small polyps in the rectosigmoid and pathology was hyperplastic  She had a left colon diverticulosis  She needs a screening colonoscopy in 5 years time in February 2025 due to her significant family history    6. Diverticulosis of large intestine without hemorrhage  Left-sided diverticulosis noted with recent colonoscopy and CT scan abdomen.   No current signs of any diverticulitis  High-fiber diet recommended      Follow Up:   No follow-ups on file.    Homero Knox MD  [facility] [place]  7/1/2020  15:37    Please note that portions of this note may have been completed with a voice recognition program. Efforts were made to edit the dictations, but occasionally words are mistranscribed.

## 2020-07-07 PROBLEM — R11.0 NAUSEA: Status: ACTIVE | Noted: 2020-07-07

## 2020-07-07 PROBLEM — K92.1 MELENA: Status: ACTIVE | Noted: 2020-07-07

## 2020-08-16 ENCOUNTER — HOSPITAL ENCOUNTER (EMERGENCY)
Facility: HOSPITAL | Age: 75
Discharge: HOME | End: 2020-08-16
Attending: EMERGENCY MEDICINE | Admitting: EMERGENCY MEDICINE

## 2020-08-16 VITALS
DIASTOLIC BLOOD PRESSURE: 96 MMHG | HEIGHT: 66 IN | OXYGEN SATURATION: 94 % | WEIGHT: 169.6 LBS | TEMPERATURE: 97.4 F | RESPIRATION RATE: 16 BRPM | BODY MASS INDEX: 27.26 KG/M2 | HEART RATE: 77 BPM | SYSTOLIC BLOOD PRESSURE: 169 MMHG

## 2020-08-16 DIAGNOSIS — F13.10 BENZODIAZEPINE ABUSE (HCC): Primary | ICD-10-CM

## 2020-08-16 DIAGNOSIS — F13.20 BENZODIAZEPINE DEPENDENCE (HCC): ICD-10-CM

## 2020-08-16 PROCEDURE — 99283 EMERGENCY DEPT VISIT LOW MDM: CPT

## 2020-08-16 RX ORDER — LORAZEPAM 0.5 MG/1
0.5 TABLET ORAL EVERY 6 HOURS PRN
Qty: 8 TABLET | Refills: 0 | Status: SHIPPED | OUTPATIENT
Start: 2020-08-16 | End: 2020-12-23

## 2020-08-16 RX ORDER — LORAZEPAM 0.5 MG/1
1 TABLET ORAL ONCE
Status: COMPLETED | OUTPATIENT
Start: 2020-08-16 | End: 2020-08-16

## 2020-08-16 RX ADMIN — LORAZEPAM 1 MG: 0.5 TABLET ORAL at 10:59

## 2020-08-16 NOTE — ED PROVIDER NOTES
"Subjective   75-year-old female presents to the ED for chief complaint of possible withdrawal from her Ativan.  The patient states that she was prescribed Ativan for greater than 20 years.  She states 1 mg tab 4 times per day.  She ran out too early so she called her primary care physician who wrote her another prescription but the pharmacy refused to fill it.  She states that her  has Alzheimer's and she is trying to care for him and she is not dealing well and that she has been taking up to 5 pills/day.  She states that she has been out for 3 days.  She is concerned about withdrawal as she is becoming jittery and anxious.  No nausea vomiting diarrhea or abdominal pain.  No chest pain or shortness of breath.  No other complaints at this time.          Review of Systems   Psychiatric/Behavioral: Positive for agitation.   All other systems reviewed and are negative.      Past Medical History:   Diagnosis Date   • Anesthesia 02/14/2020    Patient reported she is slow to wake after anesthesia   • Anxiety    • Asthma    • Body piercing     both ears   • Cataract, bilateral     s/p surgery   • Colon polyp     adenomatous polyps   • Depression    • Diabetes mellitus (CMS/[facility])    • Elevated cholesterol    • Headache    • Heart murmur    • History of chest pain 02/14/2020    Patient reported \"they think it's nerves\" and reported last episode was 6-7 months ago   • Hyperlipidemia    • Hypertension    • Peptic ulceration    • RA (rheumatoid arthritis) (CMS/HCC)    • Skin cancer     face   • Urinary tract infection    • Wears dentures     full set   • Wears glasses        Allergies   Allergen Reactions   • Penicillins Rash       Past Surgical History:   Procedure Laterality Date   • ABDOMINAL HYSTERECTOMY  2007   • APPENDECTOMY     • COLONOSCOPY  2015?   • COLONOSCOPY N/A 2/17/2020    Procedure: COLONOSCOPY WITH COLD FORCEP POLYPECTOMY;  Surgeon: Belkis Peraza MD;  Location: [facility] ENDOSCOPY;  Service: " Gastroenterology;  Laterality: N/A;   • COLONOSCOPY W/ POLYPECTOMY     • LAPAROSCOPIC CHOLECYSTECTOMY  2010   • MOUTH SURGERY      full extraction of teeth   • SALPINGO OOPHORECTOMY Bilateral 2009   • SKIN BIOPSY     • SPINAL FUSION  2002       Family History   Problem Relation Age of Onset   • Uterine cancer Mother         widely metastatic   • Breast cancer Mother    • Heart attack Father        Social History     Socioeconomic History   • Marital status:      Spouse name: Not on file   • Number of children: Not on file   • Years of education: Not on file   • Highest education level: Not on file   Tobacco Use   • Smoking status: Current Every Day Smoker     Packs/day: 0.50     Years: 56.00     Pack years: 28.00     Types: Cigarettes   • Smokeless tobacco: Never Used   Substance and Sexual Activity   • Alcohol use: No   • Drug use: No   • Sexual activity: Defer           Objective   Physical Exam   Constitutional: She is oriented to person, place, and time. No distress.   Elderly appearing   HENT:   Head: Normocephalic and atraumatic.   Nose: Nose normal.   Eyes: Conjunctivae and EOM are normal.   Cardiovascular: Normal rate and regular rhythm.   Pulmonary/Chest: Effort normal and breath sounds normal. No respiratory distress.   Abdominal: Soft. She exhibits no distension. There is no tenderness. There is no guarding.   Musculoskeletal: She exhibits no edema or deformity.   Neurological: She is alert and oriented to person, place, and time. No cranial nerve deficit.   Skin: She is not diaphoretic.   Nursing note and vitals reviewed.      Procedures           ED Course                                           MDM  Patient presents after stopping Ativan secondary to running out of pills.  Pharmacy would not refill her prescription.  Given her dependence on benzodiazepines I do have some concern for possible withdrawal.  We will give her a dose here and give her a prescription for 2 days in order to give her  time to see her PCP and discussion to changes to her medication regime.       Final diagnoses:   Benzodiazepine abuse (CMS/HCC)   Benzodiazepine dependence (CMS/HCC)            Tej Umanzor, DO  08/16/20 1050

## 2020-08-19 ENCOUNTER — HOSPITAL ENCOUNTER (EMERGENCY)
Facility: HOSPITAL | Age: 75
Discharge: HOME | End: 2020-08-19
Attending: EMERGENCY MEDICINE | Admitting: EMERGENCY MEDICINE

## 2020-08-19 ENCOUNTER — APPOINTMENT (OUTPATIENT)
Dept: GENERAL RADIOLOGY | Facility: HOSPITAL | Age: 75
End: 2020-08-19

## 2020-08-19 ENCOUNTER — APPOINTMENT (OUTPATIENT)
Dept: CT IMAGING | Facility: HOSPITAL | Age: 75
End: 2020-08-19

## 2020-08-19 VITALS
OXYGEN SATURATION: 98 % | HEIGHT: 66 IN | TEMPERATURE: 98.4 F | RESPIRATION RATE: 16 BRPM | BODY MASS INDEX: 27.32 KG/M2 | SYSTOLIC BLOOD PRESSURE: 142 MMHG | HEART RATE: 70 BPM | DIASTOLIC BLOOD PRESSURE: 75 MMHG | WEIGHT: 170 LBS

## 2020-08-19 DIAGNOSIS — E87.6 HYPOKALEMIA: ICD-10-CM

## 2020-08-19 DIAGNOSIS — R07.9 CHEST PAIN, UNSPECIFIED TYPE: Primary | ICD-10-CM

## 2020-08-19 DIAGNOSIS — E83.42 HYPOMAGNESEMIA: ICD-10-CM

## 2020-08-19 DIAGNOSIS — R51.9 ACUTE NONINTRACTABLE HEADACHE, UNSPECIFIED HEADACHE TYPE: ICD-10-CM

## 2020-08-19 LAB
ALBUMIN SERPL-MCNC: 4.2 G/DL (ref 3.5–5.2)
ALBUMIN/GLOB SERPL: 1.3 G/DL
ALP SERPL-CCNC: 86 U/L (ref 39–117)
ALT SERPL W P-5'-P-CCNC: 16 U/L (ref 1–33)
ANION GAP SERPL CALCULATED.3IONS-SCNC: 12.4 MMOL/L (ref 5–15)
AST SERPL-CCNC: 21 U/L (ref 1–32)
BASOPHILS # BLD AUTO: 0.18 10*3/MM3 (ref 0–0.2)
BASOPHILS NFR BLD AUTO: 1.7 % (ref 0–1.5)
BILIRUB SERPL-MCNC: 0.4 MG/DL (ref 0–1.2)
BUN SERPL-MCNC: 5 MG/DL (ref 8–23)
BUN/CREAT SERPL: 8.2 (ref 7–25)
CALCIUM SPEC-SCNC: 9.2 MG/DL (ref 8.6–10.5)
CHLORIDE SERPL-SCNC: 92 MMOL/L (ref 98–107)
CO2 SERPL-SCNC: 32.6 MMOL/L (ref 22–29)
CREAT SERPL-MCNC: 0.61 MG/DL (ref 0.57–1)
DEPRECATED RDW RBC AUTO: 38.7 FL (ref 37–54)
EOSINOPHIL # BLD AUTO: 0.33 10*3/MM3 (ref 0–0.4)
EOSINOPHIL NFR BLD AUTO: 3.1 % (ref 0.3–6.2)
ERYTHROCYTE [DISTWIDTH] IN BLOOD BY AUTOMATED COUNT: 12.3 % (ref 12.3–15.4)
GFR SERPL CREATININE-BSD FRML MDRD: 96 ML/MIN/1.73
GLOBULIN UR ELPH-MCNC: 3.3 GM/DL
GLUCOSE BLDC GLUCOMTR-MCNC: 198 MG/DL (ref 70–130)
GLUCOSE SERPL-MCNC: 195 MG/DL (ref 65–99)
HCT VFR BLD AUTO: 50.6 % (ref 34–46.6)
HGB BLD-MCNC: 17.9 G/DL (ref 12–15.9)
HOLD SPECIMEN: NORMAL
HOLD SPECIMEN: NORMAL
IMM GRANULOCYTES # BLD AUTO: 0.02 10*3/MM3 (ref 0–0.05)
IMM GRANULOCYTES NFR BLD AUTO: 0.2 % (ref 0–0.5)
LYMPHOCYTES # BLD AUTO: 2.83 10*3/MM3 (ref 0.7–3.1)
LYMPHOCYTES NFR BLD AUTO: 26.4 % (ref 19.6–45.3)
MAGNESIUM SERPL-MCNC: 1.7 MG/DL (ref 1.6–2.4)
MCH RBC QN AUTO: 30.4 PG (ref 26.6–33)
MCHC RBC AUTO-ENTMCNC: 35.4 G/DL (ref 31.5–35.7)
MCV RBC AUTO: 85.9 FL (ref 79–97)
MONOCYTES # BLD AUTO: 1.05 10*3/MM3 (ref 0.1–0.9)
MONOCYTES NFR BLD AUTO: 9.8 % (ref 5–12)
NEUTROPHILS NFR BLD AUTO: 58.8 % (ref 42.7–76)
NEUTROPHILS NFR BLD AUTO: 6.29 10*3/MM3 (ref 1.7–7)
NRBC BLD AUTO-RTO: 0 /100 WBC (ref 0–0.2)
PLATELET # BLD AUTO: 362 10*3/MM3 (ref 140–450)
PMV BLD AUTO: 8.9 FL (ref 6–12)
POTASSIUM SERPL-SCNC: 2.8 MMOL/L (ref 3.5–5.2)
PROT SERPL-MCNC: 7.5 G/DL (ref 6–8.5)
RBC # BLD AUTO: 5.89 10*6/MM3 (ref 3.77–5.28)
SODIUM SERPL-SCNC: 137 MMOL/L (ref 136–145)
TROPONIN I SERPL-MCNC: 0.01 NG/ML (ref 0–0.05)
TROPONIN T SERPL-MCNC: <0.01 NG/ML (ref 0–0.03)
TROPONIN T SERPL-MCNC: <0.01 NG/ML (ref 0–0.03)
WBC # BLD AUTO: 10.7 10*3/MM3 (ref 3.4–10.8)
WHOLE BLOOD HOLD SPECIMEN: NORMAL
WHOLE BLOOD HOLD SPECIMEN: NORMAL

## 2020-08-19 PROCEDURE — 96365 THER/PROPH/DIAG IV INF INIT: CPT

## 2020-08-19 PROCEDURE — 25010000002 KETOROLAC TROMETHAMINE PER 15 MG: Performed by: EMERGENCY MEDICINE

## 2020-08-19 PROCEDURE — 25010000002 MAGNESIUM SULFATE 2 GM/50ML SOLUTION: Performed by: EMERGENCY MEDICINE

## 2020-08-19 PROCEDURE — 84484 ASSAY OF TROPONIN QUANT: CPT | Performed by: EMERGENCY MEDICINE

## 2020-08-19 PROCEDURE — 96375 TX/PRO/DX INJ NEW DRUG ADDON: CPT

## 2020-08-19 PROCEDURE — 25010000002 ONDANSETRON PER 1 MG: Performed by: EMERGENCY MEDICINE

## 2020-08-19 PROCEDURE — 70450 CT HEAD/BRAIN W/O DYE: CPT

## 2020-08-19 PROCEDURE — 82962 GLUCOSE BLOOD TEST: CPT

## 2020-08-19 PROCEDURE — 93005 ELECTROCARDIOGRAM TRACING: CPT | Performed by: EMERGENCY MEDICINE

## 2020-08-19 PROCEDURE — 25010000002 LORAZEPAM PER 2 MG: Performed by: EMERGENCY MEDICINE

## 2020-08-19 PROCEDURE — 80053 COMPREHEN METABOLIC PANEL: CPT | Performed by: EMERGENCY MEDICINE

## 2020-08-19 PROCEDURE — 85025 COMPLETE CBC W/AUTO DIFF WBC: CPT | Performed by: EMERGENCY MEDICINE

## 2020-08-19 PROCEDURE — 99284 EMERGENCY DEPT VISIT MOD MDM: CPT

## 2020-08-19 PROCEDURE — 83735 ASSAY OF MAGNESIUM: CPT | Performed by: EMERGENCY MEDICINE

## 2020-08-19 PROCEDURE — 71045 X-RAY EXAM CHEST 1 VIEW: CPT

## 2020-08-19 RX ORDER — MAGNESIUM OXIDE 400 MG/1
400 TABLET ORAL DAILY
Qty: 15 TABLET | Refills: 0 | Status: SHIPPED | OUTPATIENT
Start: 2020-08-19 | End: 2020-12-23

## 2020-08-19 RX ORDER — KETOROLAC TROMETHAMINE 30 MG/ML
15 INJECTION, SOLUTION INTRAMUSCULAR; INTRAVENOUS ONCE
Status: COMPLETED | OUTPATIENT
Start: 2020-08-19 | End: 2020-08-19

## 2020-08-19 RX ORDER — SODIUM CHLORIDE 0.9 % (FLUSH) 0.9 %
10 SYRINGE (ML) INJECTION AS NEEDED
Status: DISCONTINUED | OUTPATIENT
Start: 2020-08-19 | End: 2020-08-19 | Stop reason: HOSPADM

## 2020-08-19 RX ORDER — MAGNESIUM SULFATE HEPTAHYDRATE 40 MG/ML
2 INJECTION, SOLUTION INTRAVENOUS ONCE
Status: COMPLETED | OUTPATIENT
Start: 2020-08-19 | End: 2020-08-19

## 2020-08-19 RX ORDER — LORAZEPAM 2 MG/ML
0.5 INJECTION INTRAMUSCULAR ONCE
Status: COMPLETED | OUTPATIENT
Start: 2020-08-19 | End: 2020-08-19

## 2020-08-19 RX ORDER — ASPIRIN 325 MG
325 TABLET ORAL ONCE
Status: DISCONTINUED | OUTPATIENT
Start: 2020-08-19 | End: 2020-08-19 | Stop reason: HOSPADM

## 2020-08-19 RX ORDER — ONDANSETRON 2 MG/ML
4 INJECTION INTRAMUSCULAR; INTRAVENOUS ONCE
Status: COMPLETED | OUTPATIENT
Start: 2020-08-19 | End: 2020-08-19

## 2020-08-19 RX ORDER — POTASSIUM CHLORIDE 750 MG/1
40 CAPSULE, EXTENDED RELEASE ORAL ONCE
Status: COMPLETED | OUTPATIENT
Start: 2020-08-19 | End: 2020-08-19

## 2020-08-19 RX ORDER — NITROGLYCERIN 0.4 MG/1
0.4 TABLET SUBLINGUAL
Status: DISCONTINUED | OUTPATIENT
Start: 2020-08-19 | End: 2020-08-19 | Stop reason: HOSPADM

## 2020-08-19 RX ORDER — POTASSIUM CHLORIDE 20 MEQ/1
20 TABLET, EXTENDED RELEASE ORAL 2 TIMES DAILY
Qty: 30 TABLET | Refills: 0 | Status: SHIPPED | OUTPATIENT
Start: 2020-08-19 | End: 2020-12-23

## 2020-08-19 RX ADMIN — MAGNESIUM SULFATE IN WATER 2 G: 40 INJECTION, SOLUTION INTRAVENOUS at 10:05

## 2020-08-19 RX ADMIN — KETOROLAC TROMETHAMINE 15 MG: 30 INJECTION, SOLUTION INTRAMUSCULAR; INTRAVENOUS at 12:33

## 2020-08-19 RX ADMIN — ONDANSETRON 4 MG: 2 INJECTION INTRAMUSCULAR; INTRAVENOUS at 10:04

## 2020-08-19 RX ADMIN — POTASSIUM CHLORIDE 40 MEQ: 10 CAPSULE, COATED, EXTENDED RELEASE ORAL at 09:48

## 2020-08-19 RX ADMIN — LORAZEPAM 0.5 MG: 2 INJECTION, SOLUTION INTRAMUSCULAR; INTRAVENOUS at 11:16

## 2020-08-19 NOTE — ED PROVIDER NOTES
"Subjective   History of Present Illness    Chief Complaint: Headache chest pain anxiety  History of Present Illness: 75-year-old female with above complaints and feeling of flushing.  Using lorazepam at home without relief.  Dull headache.  Feels chest pressure.  Feels like lorazepam makes it worse.  Onset 1 AM  Onset: 1 AM  Duration: Persistent  Exacerbating / Alleviating factors: None  Associated symptoms: None      Nurses Notes reviewed and agree, including vitals, allergies, social history and prior medical history.     REVIEW OF SYSTEMS: All systems reviewed and not pertinent unless noted.    Positive for: Headache chest pain anxiety    Negative for: Fever cough hemoptysis syncope palpitations  Review of Systems    Past Medical History:   Diagnosis Date   • Anesthesia 02/14/2020    Patient reported she is slow to wake after anesthesia   • Anxiety    • Asthma    • Body piercing     both ears   • Cataract, bilateral     s/p surgery   • Colon polyp     adenomatous polyps   • Depression    • Diabetes mellitus (CMS/HCC)    • Elevated cholesterol    • Headache    • Heart murmur    • History of chest pain 02/14/2020    Patient reported \"they think it's nerves\" and reported last episode was 6-7 months ago   • Hyperlipidemia    • Hypertension    • Peptic ulceration    • RA (rheumatoid arthritis) (CMS/HCC)    • Skin cancer     face   • Urinary tract infection    • Wears dentures     full set   • Wears glasses        Allergies   Allergen Reactions   • Penicillins Rash       Past Surgical History:   Procedure Laterality Date   • ABDOMINAL HYSTERECTOMY  2007   • APPENDECTOMY     • COLONOSCOPY  2015?   • COLONOSCOPY N/A 2/17/2020    Procedure: COLONOSCOPY WITH COLD FORCEP POLYPECTOMY;  Surgeon: Belkis Peraza MD;  Location: [facility] ENDOSCOPY;  Service: Gastroenterology;  Laterality: N/A;   • COLONOSCOPY W/ POLYPECTOMY     • LAPAROSCOPIC CHOLECYSTECTOMY  2010   • MOUTH SURGERY      full extraction of teeth   • SALPINGO " OOPHORECTOMY Bilateral 2009   • SKIN BIOPSY     • SPINAL FUSION  2002       Family History   Problem Relation Age of Onset   • Uterine cancer Mother         widely metastatic   • Breast cancer Mother    • Heart attack Father        Social History     Socioeconomic History   • Marital status:      Spouse name: Not on file   • Number of children: Not on file   • Years of education: Not on file   • Highest education level: Not on file   Tobacco Use   • Smoking status: Current Every Day Smoker     Packs/day: 0.50     Years: 56.00     Pack years: 28.00     Types: Cigarettes   • Smokeless tobacco: Never Used   Substance and Sexual Activity   • Alcohol use: No   • Drug use: No   • Sexual activity: Defer           Objective   Physical Exam    GENERAL APPEARANCE: Well developed, elderly 75-year-old white female,  in no acute distress.  VITAL SIGNS: per nursing, reviewed and noted  SKIN: exposed skin with no rashes, ulcerations or petechiae.  Head: Normocephalic, atraumatic.   EYES: perrla. EOMI.  ENT: Normal voice.  Patient maintained wearing a mask throughout patient encounter due to coronavirus pandemic  LUNGS:  No increased work of breathing. No retractions.   CARDIOVASCULAR:  regular rate and rhythm, no murmurs.  Good Peripheral pulses. Good cap refill to extremities.   ABDOMEN: Soft, nontender, normal bowel sounds. No hernia. No ascites.  MUSCULOSKELETAL:  No tenderness. Full ROM. Strength and tone normal.  NEUROLOGIC: Alert, oriented x 3. No gross deficits. GCS 15.   NECK: Supple, symmetric. No tenderness, no masses. Full ROM  Back: full rom, no paraspinal spasm. No CVA tenderness.   PSYCH: appropriate affect.  : no bladder tenderness or distention, no CVA tenderness      Procedures     No attending physician procedures were performed on this patient.      ED Course  ED Course as of Aug 19 1229   Wed Aug 19, 2020   0953 Magnesium: 1.7 [PF]   0953 Troponin T: <0.010 [PF]   0953 Troponin I: 0.010 [PF]   0953  WBC: 10.70 [PF]   0953 Hemoglobin(!): 17.9 [PF]   0953 Hematocrit(!): 50.6 [PF]   0953 Platelets: 362 [PF]   0953 Glucose(!): 195 [PF]   0953 BUN(!): 5 [PF]   0953 Creatinine: 0.61 [PF]   0953 Sodium: 137 [PF]   0953 Potassium(!): 2.8 [PF]   0953 Chloride(!): 92 [PF]   0953 CO2(!): 32.6 [PF]   0953 Calcium: 9.2 [PF]   0953 Total Protein: 7.5 [PF]   0953 Albumin: 4.20 [PF]   0953 ALT (SGPT): 16 [PF]   0953 AST (SGOT): 21 [PF]   0953 Alkaline Phosphatase: 86 [PF]   0953 Total Bilirubin: 0.4 [PF]   0953 COMPARISON: MRI dated 12/20/2018.     TECHNIQUE: Multiple axial CT images were performed from the foramen  magnum to the vertex without enhancement.      FINDINGS: There is generalized cerebral volume loss. Patchy  hypodensities in the periventricular white matter consistent with  chronic small vessel ischemic change. There is no CT evidence of  hemorrhage. There is no mass, mass effect or midline shift.  There is no  hydrocephalus. The paranasal sinuses are clear. Bone windows reveal no  acute osseous abnormalities.     IMPRESSION:  No acute intracranial process.    [PF]   0953 PROCEDURE: XR CHEST 1 VW-     HISTORY: Chest Pain Triage Protocol     COMPARISON: 04/24/2020.     FINDINGS: The heart is normal in size. The mediastinum is unremarkable.  There are chronic interstitial lung changes. No focal opacities were  effusions are evident. There is no pneumothorax.  There are no acute  osseous abnormalities.     IMPRESSION:  No acute cardiopulmonary process.     Continued followup is recommended.     This report was finalized on 8/19/2020 8:53 AM by Leona Hammonds M.D..    [PF]   1058 Troponin T: <0.010 [PF]      ED Course User Index  [PF] [place]bour,       EKG interpreted by me reveals sinus rhythm with occasional PVCs.  Rate of 88.  Nonspecific T wave changes.  Moderate artifact.     Heart score 3                                MDM    Reassuring cardiac work-up with troponins negative x2.  No evidence of  ischemic changes on EKG.  Chest x-ray without acute findings.  Normal head CT.  Neurologically intact.  No meningeal signs.  No thunderclap presentation of headache.  Blood pressure better after IV Ativan.  Chest pain-free.  Discharged home in stable condition with outpatient follow-up recommendations and strict return precautions discussed.        Final diagnoses:   Chest pain, unspecified type   Acute nonintractable headache, unspecified headache type            [place]bour, DO  08/19/20 1229       [place]bour,   08/19/20 1642

## 2020-08-25 ENCOUNTER — OFFICE VISIT (OUTPATIENT)
Dept: CARDIOLOGY | Facility: CLINIC | Age: 75
End: 2020-08-25

## 2020-08-25 VITALS
SYSTOLIC BLOOD PRESSURE: 144 MMHG | BODY MASS INDEX: 26.68 KG/M2 | WEIGHT: 166 LBS | DIASTOLIC BLOOD PRESSURE: 76 MMHG | OXYGEN SATURATION: 97 % | HEART RATE: 75 BPM | HEIGHT: 66 IN

## 2020-08-25 DIAGNOSIS — I10 ESSENTIAL HYPERTENSION: ICD-10-CM

## 2020-08-25 DIAGNOSIS — F17.200 SMOKER: ICD-10-CM

## 2020-08-25 DIAGNOSIS — I20.9 ANGINA PECTORIS (HCC): Primary | ICD-10-CM

## 2020-08-25 DIAGNOSIS — E78.2 MIXED HYPERLIPIDEMIA: ICD-10-CM

## 2020-08-25 PROCEDURE — 99214 OFFICE O/P EST MOD 30 MIN: CPT | Performed by: INTERNAL MEDICINE

## 2020-08-25 PROCEDURE — 99407 BEHAV CHNG SMOKING > 10 MIN: CPT | Performed by: INTERNAL MEDICINE

## 2020-08-25 NOTE — PROGRESS NOTES
[facility]  Office Progress Note  Katharine Fitzpatrick  1945      Visit Date: 08/25/20    PCP: Murtaza Elder MD  [address] [facility] [address] [place], [place] [id number]    IDENTIFICATION: A 75 y.o. female  originally from [place], a retired surgical. RN. Moved to be closer to family    PROBLEM LIST:  1. CP  1. 2/7/2018 MPS: Lexiscan Cardiolite WNL, EF 70%  2. HTN  3. Murmur  1. 2/18 echo w LVOT turbulence peak gradient 17mmHg  2. 6/19 LBBB  4. HLD, on crestor   1. 1/10/18 lipids:   HDL 45 LDL 87  5. DM II  1. Dx 2010, insulin since 2004  2. 1/10/18 A1c 7.3  3. 12/14/18 a1c 7.6  6. Tobacco abuse  1. 1/2 PPD  7. RA  1. Sees Dr. Morgan, on humria  8. Depression/anxiety    Chief Complaint   Patient presents with   • Coronary Artery Disease   • Nausea       Allergies  Allergies   Allergen Reactions   • Penicillins Rash       Current Medications    Current Outpatient Medications:   •  amLODIPine (NORVASC) 5 MG tablet, Take 10 mg by mouth Daily., Disp: , Rfl:   •  dexlansoprazole (DEXILANT) 60 MG capsule, Take 1 capsule by mouth Daily., Disp: 30 capsule, Rfl: 2  •  fenofibrate 160 MG tablet, Take 160 mg by mouth Daily., Disp: , Rfl:   •  fluticasone-salmeterol (ADVAIR DISKUS) 250-50 MCG/DOSE DISKUS, Inhale 1 puff 2 (Two) Times a Day., Disp: 60 each, Rfl: 5  •  hydrOXYzine (VISTARIL) 50 MG capsule, Take 1 capsule by mouth 3 (Three) Times a Day As Needed for Itching., Disp: 30 capsule, Rfl: 0  •  insulin aspart (novoLOG FLEXPEN) 100 UNIT/ML solution pen-injector sc pen, Inject  under the skin into the appropriate area as directed 3 (Three) Times a Day With Meals. SSI, Disp: , Rfl:   •  Insulin Glargine (BASAGLAR KWIKPEN) 100 UNIT/ML injection pen, Inject 10 Units under the skin into the appropriate area as directed Every Night., Disp: , Rfl:   •  isosorbide dinitrate (ISORDIL) 30 MG tablet, Take 30 mg by mouth 4 (Four) Times a Day., Disp: , Rfl:   •  LORazepam (ATIVAN) 0.5 MG tablet,  Take 1 tablet by mouth Every 6 (Six) Hours As Needed for Anxiety., Disp: 8 tablet, Rfl: 0  •  LORazepam (ATIVAN) 1 MG tablet, Take 2 mg by mouth 2 (Two) Times a Day. Patient takes 2 tablets 2 times a day., Disp: , Rfl:   •  losartan (COZAAR) 50 MG tablet, Take 1 tablet by mouth Daily., Disp: 90 tablet, Rfl: 3  •  methocarbamol (ROBAXIN) 500 MG tablet, Take 1,000 mg by mouth 2 (Two) Times a Day. Patient takes 2 tablets 2 times a day., Disp: , Rfl:   •  metoprolol tartrate (LOPRESSOR) 50 MG tablet, Take 1 tablet by mouth 2 (Two) Times a Day., Disp: 180 tablet, Rfl: 3  •  nitrofurantoin (MACRODANTIN) 100 MG capsule, Take 1 capsule by mouth Daily., Disp: 30 capsule, Rfl: 5  •  nitroglycerin (NITROSTAT) 0.4 MG SL tablet, 1 under the tongue as needed for angina, may repeat q5mins for up three doses (Patient taking differently: Place 0.4 mg under the tongue Every 5 (Five) Minutes As Needed (pt states has never taken this). 1 under the tongue as needed for angina, may repeat q5mins for up three doses), Disp: 100 tablet, Rfl: 11  •  nystatin-triamcinolone (MYCOLOG) 801929-1.1 UNIT/GM-% ointment, Apply  topically to the appropriate area as directed 2 (Two) Times a Day. Apply to intertriginous folds and external genitalia as needed, Disp: 45 g, Rfl: 3  •  promethazine (PHENERGAN) 12.5 MG tablet, Take 1 tablet by mouth 3 (Three) Times a Day As Needed for Nausea or Vomiting., Disp: 30 tablet, Rfl: 0  •  rosuvastatin (CRESTOR) 10 MG tablet, Take 10 mg by mouth Daily., Disp: , Rfl:   •  senna (senna) 8.6 MG tablet, Take 1 tablet by mouth Every Night., Disp: 30 tablet, Rfl: 2  •  venlafaxine XR (EFFEXOR-XR) 150 MG 24 hr capsule, TAKE 1 CAPSULE TWICE DAILY (Patient taking differently: Take 150 mg by mouth 2 (Two) Times a Day.), Disp: 180 capsule, Rfl: 3  •  magnesium oxide (MAG-OX) 400 MG tablet, Take 1 tablet by mouth Daily., Disp: 15 tablet, Rfl: 0  •  potassium chloride (K-DUR,KLOR-CON) 20 MEQ CR tablet, Take 1 tablet by mouth 2  "(Two) Times a Day. (Patient taking differently: Take 20 mEq by mouth Daily.), Disp: 30 tablet, Rfl: 0      History of Present Illness   Katharine Fitzpatrick is a 75 y.o. year old female here for follow up.  Chronic stable anginal equivalent unfortunately continues to smoke .  ER visit due to anxiety as she is attempting to take care of her  now with advancing Alzheimer's.  She states she has been smoking more and with COVID been unable to leave home.  Her  was the primary  and is no longer driving        OBJECTIVE:  Vitals:    08/25/20 1427   BP: 144/76   BP Location: Right arm   Patient Position: Sitting   Pulse: 75   SpO2: 97%   Weight: 75.3 kg (166 lb)   Height: 167.6 cm (66\")     Physical Exam   Constitutional: She appears well-developed and well-nourished.   Neck: Normal range of motion. Neck supple. No hepatojugular reflux and no JVD present. Carotid bruit is not present. No tracheal deviation present. No thyromegaly present.   Cardiovascular: Normal rate, regular rhythm, S1 normal, S2 normal, intact distal pulses and normal pulses. PMI is not displaced. Exam reveals no gallop, no distant heart sounds, no friction rub, no midsystolic click and no opening snap.   Murmur heard.  Pulses:       Radial pulses are 2+ on the right side, and 2+ on the left side.        Dorsalis pedis pulses are 2+ on the right side, and 2+ on the left side.        Posterior tibial pulses are 2+ on the right side, and 2+ on the left side.   Pulmonary/Chest: Effort normal. She has wheezes. She has no rales.   Abdominal: Soft. Bowel sounds are normal. She exhibits no mass. There is no tenderness. There is no guarding.       Diagnostic Data:  Procedures      ASSESSMENT:   Diagnosis Plan   1. Angina pectoris (CMS/HCC)     2. Essential hypertension     3. Mixed hyperlipidemia     4. Smoker         PLAN:  Stable angina with low risk ischemic testing historically low threshold for invasive ischemic evaluation with any " crescendo pattern, high risk EKG, isoenzyme elevation    Hypertension controlled combination therapy amlodipine metoprolol losartan    Dyslipidemia on statin and fenofibrate therapy    Diabetes insulin requiring with uncontrolled A1c greater than 9 per her last evaluation counseled regards lower carbohydrate intake    Greater than 10 minutes smoking cessation counseling undertaken    Patient  may need to explore assisted living for her  and/or discussed with psychiatrist    Murtaza Elder MD, thank you for referring Ms. Fitzpatrick for evaluation.  I have forwarded my electronically generated recommendations to you for review.  Please do not hesitate to call with any questions.      Joe Hernandez MD, FACC

## 2020-12-07 ENCOUNTER — LAB (OUTPATIENT)
Dept: LAB | Facility: HOSPITAL | Age: 75
End: 2020-12-07

## 2020-12-07 ENCOUNTER — TRANSCRIBE ORDERS (OUTPATIENT)
Dept: ADMINISTRATIVE | Facility: HOSPITAL | Age: 75
End: 2020-12-07

## 2020-12-07 ENCOUNTER — TRANSCRIBE ORDERS (OUTPATIENT)
Dept: LAB | Facility: HOSPITAL | Age: 75
End: 2020-12-07

## 2020-12-07 ENCOUNTER — APPOINTMENT (OUTPATIENT)
Dept: LAB | Facility: HOSPITAL | Age: 75
End: 2020-12-07

## 2020-12-07 DIAGNOSIS — R94.5 ABNORMAL RESULTS OF LIVER FUNCTION STUDIES: ICD-10-CM

## 2020-12-07 DIAGNOSIS — L40.0 PSORIASIS VULGARIS: ICD-10-CM

## 2020-12-07 DIAGNOSIS — Z11.4 ENCOUNTER FOR SCREENING FOR HUMAN IMMUNODEFICIENCY VIRUS (HIV): ICD-10-CM

## 2020-12-07 DIAGNOSIS — L40.0 PSORIASIS VULGARIS: Primary | ICD-10-CM

## 2020-12-08 ENCOUNTER — LAB (OUTPATIENT)
Dept: LAB | Facility: HOSPITAL | Age: 75
End: 2020-12-08

## 2020-12-08 DIAGNOSIS — L40.0 PSORIASIS VULGARIS: ICD-10-CM

## 2020-12-08 DIAGNOSIS — R94.5 ABNORMAL RESULTS OF LIVER FUNCTION STUDIES: ICD-10-CM

## 2020-12-08 DIAGNOSIS — Z11.4 ENCOUNTER FOR SCREENING FOR HUMAN IMMUNODEFICIENCY VIRUS (HIV): ICD-10-CM

## 2020-12-08 LAB
ALBUMIN SERPL-MCNC: 3.6 G/DL (ref 3.5–5.2)
ALBUMIN/GLOB SERPL: 1.1 G/DL
ALP SERPL-CCNC: 92 U/L (ref 39–117)
ALT SERPL W P-5'-P-CCNC: 17 U/L (ref 1–33)
ANION GAP SERPL CALCULATED.3IONS-SCNC: 7.4 MMOL/L (ref 5–15)
AST SERPL-CCNC: 29 U/L (ref 1–32)
BACTERIA UR QL AUTO: ABNORMAL /HPF
BASOPHILS # BLD AUTO: 0.16 10*3/MM3 (ref 0–0.2)
BASOPHILS NFR BLD AUTO: 2.1 % (ref 0–1.5)
BILIRUB SERPL-MCNC: 0.2 MG/DL (ref 0–1.2)
BILIRUB UR QL STRIP: NEGATIVE
BUN SERPL-MCNC: 11 MG/DL (ref 8–23)
BUN/CREAT SERPL: 13.8 (ref 7–25)
CALCIUM SPEC-SCNC: 9 MG/DL (ref 8.6–10.5)
CHLORIDE SERPL-SCNC: 96 MMOL/L (ref 98–107)
CLARITY UR: CLEAR
CO2 SERPL-SCNC: 31.6 MMOL/L (ref 22–29)
COLOR UR: YELLOW
CREAT SERPL-MCNC: 0.8 MG/DL (ref 0.57–1)
DEPRECATED RDW RBC AUTO: 38.5 FL (ref 37–54)
EOSINOPHIL # BLD AUTO: 0.28 10*3/MM3 (ref 0–0.4)
EOSINOPHIL NFR BLD AUTO: 3.6 % (ref 0.3–6.2)
ERYTHROCYTE [DISTWIDTH] IN BLOOD BY AUTOMATED COUNT: 11.8 % (ref 12.3–15.4)
GFR SERPL CREATININE-BSD FRML MDRD: 70 ML/MIN/1.73
GLOBULIN UR ELPH-MCNC: 3.2 GM/DL
GLUCOSE SERPL-MCNC: 247 MG/DL (ref 65–99)
GLUCOSE UR STRIP-MCNC: ABNORMAL MG/DL
HAV IGM SERPL QL IA: NORMAL
HBV CORE IGM SERPL QL IA: NORMAL
HBV SURFACE AG SERPL QL IA: NORMAL
HCT VFR BLD AUTO: 43.8 % (ref 34–46.6)
HCV AB SER DONR QL: NORMAL
HGB BLD-MCNC: 14.8 G/DL (ref 12–15.9)
HGB UR QL STRIP.AUTO: NEGATIVE
HIV1 P24 AG SER QL: NORMAL
HIV1+2 AB SER QL: NORMAL
HYALINE CASTS UR QL AUTO: ABNORMAL /LPF
IMM GRANULOCYTES # BLD AUTO: 0.03 10*3/MM3 (ref 0–0.05)
IMM GRANULOCYTES NFR BLD AUTO: 0.4 % (ref 0–0.5)
KETONES UR QL STRIP: NEGATIVE
LEUKOCYTE ESTERASE UR QL STRIP.AUTO: ABNORMAL
LYMPHOCYTES # BLD AUTO: 1.67 10*3/MM3 (ref 0.7–3.1)
LYMPHOCYTES NFR BLD AUTO: 21.4 % (ref 19.6–45.3)
MCH RBC QN AUTO: 30.3 PG (ref 26.6–33)
MCHC RBC AUTO-ENTMCNC: 33.8 G/DL (ref 31.5–35.7)
MCV RBC AUTO: 89.6 FL (ref 79–97)
MONOCYTES # BLD AUTO: 0.8 10*3/MM3 (ref 0.1–0.9)
MONOCYTES NFR BLD AUTO: 10.3 % (ref 5–12)
NEUTROPHILS NFR BLD AUTO: 4.85 10*3/MM3 (ref 1.7–7)
NEUTROPHILS NFR BLD AUTO: 62.2 % (ref 42.7–76)
NITRITE UR QL STRIP: NEGATIVE
NRBC BLD AUTO-RTO: 0 /100 WBC (ref 0–0.2)
PH UR STRIP.AUTO: 5.5 [PH] (ref 5–8)
PLATELET # BLD AUTO: 349 10*3/MM3 (ref 140–450)
PMV BLD AUTO: 9.5 FL (ref 6–12)
POTASSIUM SERPL-SCNC: 4.6 MMOL/L (ref 3.5–5.2)
PROT SERPL-MCNC: 6.8 G/DL (ref 6–8.5)
PROT UR QL STRIP: ABNORMAL
RBC # BLD AUTO: 4.89 10*6/MM3 (ref 3.77–5.28)
RBC # UR: ABNORMAL /HPF
REF LAB TEST METHOD: ABNORMAL
SODIUM SERPL-SCNC: 135 MMOL/L (ref 136–145)
SP GR UR STRIP: 1.02 (ref 1–1.03)
SQUAMOUS #/AREA URNS HPF: ABNORMAL /HPF
UROBILINOGEN UR QL STRIP: ABNORMAL
WBC # BLD AUTO: 7.79 10*3/MM3 (ref 3.4–10.8)
WBC UR QL AUTO: ABNORMAL /HPF

## 2020-12-08 PROCEDURE — 80074 ACUTE HEPATITIS PANEL: CPT

## 2020-12-08 PROCEDURE — 87147 CULTURE TYPE IMMUNOLOGIC: CPT

## 2020-12-08 PROCEDURE — 81001 URINALYSIS AUTO W/SCOPE: CPT

## 2020-12-08 PROCEDURE — 85025 COMPLETE CBC W/AUTO DIFF WBC: CPT

## 2020-12-08 PROCEDURE — 87088 URINE BACTERIA CULTURE: CPT

## 2020-12-08 PROCEDURE — G0432 EIA HIV-1/HIV-2 SCREEN: HCPCS

## 2020-12-08 PROCEDURE — 86480 TB TEST CELL IMMUN MEASURE: CPT

## 2020-12-08 PROCEDURE — 87899 AGENT NOS ASSAY W/OPTIC: CPT

## 2020-12-08 PROCEDURE — 87086 URINE CULTURE/COLONY COUNT: CPT

## 2020-12-08 PROCEDURE — 80053 COMPREHEN METABOLIC PANEL: CPT

## 2020-12-08 PROCEDURE — 36415 COLL VENOUS BLD VENIPUNCTURE: CPT

## 2020-12-09 LAB
BACTERIA SPEC AEROBE CULT: ABNORMAL
STREP GROUPING: ABNORMAL

## 2020-12-11 LAB
GAMMA INTERFERON BACKGROUND BLD IA-ACNC: 0.05 IU/ML
M TB IFN-G BLD-IMP: NEGATIVE
M TB IFN-G CD4+ BCKGRND COR BLD-ACNC: 0.06 IU/ML
M TB IFN-G CD4+CD8+ BCKGRND COR BLD-ACNC: 0.05 IU/ML
MITOGEN IGNF BLD-ACNC: >10 IU/ML
SERVICE CMNT-IMP: NORMAL

## 2020-12-23 ENCOUNTER — OFFICE VISIT (OUTPATIENT)
Dept: CARDIOLOGY | Facility: CLINIC | Age: 75
End: 2020-12-23

## 2020-12-23 VITALS
WEIGHT: 177 LBS | HEART RATE: 90 BPM | RESPIRATION RATE: 16 BRPM | HEIGHT: 66 IN | DIASTOLIC BLOOD PRESSURE: 80 MMHG | SYSTOLIC BLOOD PRESSURE: 120 MMHG | BODY MASS INDEX: 28.45 KG/M2

## 2020-12-23 DIAGNOSIS — Z79.4 TYPE 2 DIABETES MELLITUS WITHOUT COMPLICATION, WITH LONG-TERM CURRENT USE OF INSULIN (HCC): ICD-10-CM

## 2020-12-23 DIAGNOSIS — E11.9 TYPE 2 DIABETES MELLITUS WITHOUT COMPLICATION, WITH LONG-TERM CURRENT USE OF INSULIN (HCC): ICD-10-CM

## 2020-12-23 DIAGNOSIS — R00.2 PALPITATIONS: ICD-10-CM

## 2020-12-23 DIAGNOSIS — Z72.0 TOBACCO ABUSE: ICD-10-CM

## 2020-12-23 DIAGNOSIS — R07.2 PRECORDIAL PAIN: Primary | ICD-10-CM

## 2020-12-23 DIAGNOSIS — E78.5 DYSLIPIDEMIA: ICD-10-CM

## 2020-12-23 DIAGNOSIS — I10 ESSENTIAL HYPERTENSION: ICD-10-CM

## 2020-12-23 PROCEDURE — 93000 ELECTROCARDIOGRAM COMPLETE: CPT | Performed by: INTERNAL MEDICINE

## 2020-12-23 PROCEDURE — 99214 OFFICE O/P EST MOD 30 MIN: CPT | Performed by: INTERNAL MEDICINE

## 2020-12-23 RX ORDER — TRIAMCINOLONE ACETONIDE 1 MG/G
1 CREAM TOPICAL DAILY
COMMUNITY
Start: 2020-12-03

## 2020-12-23 RX ORDER — SUMATRIPTAN 50 MG/1
50 TABLET, FILM COATED ORAL AS NEEDED
COMMUNITY
Start: 2020-11-23

## 2020-12-23 RX ORDER — HYDROCODONE BITARTRATE AND ACETAMINOPHEN 7.5; 325 MG/1; MG/1
1 TABLET ORAL DAILY
COMMUNITY
Start: 2020-11-21

## 2020-12-23 RX ORDER — CLONAZEPAM 1 MG/1
1 TABLET ORAL 3 TIMES DAILY PRN
COMMUNITY
Start: 2020-11-21

## 2020-12-23 RX ORDER — ARIPIPRAZOLE 5 MG/1
5 TABLET ORAL DAILY
COMMUNITY
Start: 2020-11-10 | End: 2020-12-24

## 2020-12-23 RX ORDER — CALCIPOTRIENE, BETAMETHASONE DIPROPIONATE 50; .643 UG/G; MG/G
1 OINTMENT TOPICAL 4 TIMES DAILY
COMMUNITY
End: 2021-03-10

## 2020-12-23 RX ORDER — KETOCONAZOLE 20 MG/G
1 CREAM TOPICAL DAILY
COMMUNITY
End: 2021-03-10

## 2020-12-23 NOTE — PROGRESS NOTES
"    Subjective:     Encounter Date:12/23/2020      Patient ID: Katharine Fitzpatrick is a 75 y.o. female.    Chief Complaint: Chest pain  HPI  This is a 75-year-old female patient who presents to cardiology clinic for 6-month history of progressively worsening chest discomfort.  The patient describes a discretely located mid sternal discomfort described as a pressure-like sensation as well as a tightness around her right breast.  The discomfort does not radiate.  It typically occurs 3 times per week and will last 15 to 20 minutes per episode.  She reports an 8/10 intensity.  There is associated shortness of breath but no nausea vomiting diaphoresis or dizziness.  She reports the discomfort typically occurs both at rest and with activities.  She cannot identify any consistent precipitating aggravating or alleviating features.  She has no personal history of myocardial infarction or coronary revascularization.  She has a history of hypertension, insulin requiring type 2 diabetes mellitus and dyslipidemia all of which are treated appropriately.  She currently smokes at least 1/2 pack of cigarettes per day.  She has never had an ischemic evaluation.  She reports shortness of breath both at rest and with activity but more prominent with \"exertional activities\".  This began approximately 2 weeks ago.  She has no orthopnea PND or lower extremity edema.  She reports having palpitations for many years.  She describes a sense that her heart is racing and beating irregularly usually when she first wakes up in the morning.  This also occurs with activity.  She has some dizziness with posture change.  She recently saw her primary care provider who was concerned that her pulse was low.  She has never worn a cardiac monitor.  Twelve-lead electrocardiogram shows normal sinus rhythm with a left bundle branch block.  The patient was unaware that this was an abnormality.  The following portions of the patient's history were reviewed and " updated as appropriate: allergies, current medications, past family history, past medical history, past social history, past surgical history and problem  Review of Systems   Constitution: Negative for chills, diaphoresis, fever, malaise/fatigue, weight gain and weight loss.   HENT: Negative for ear discharge, hearing loss, hoarse voice and nosebleeds.    Eyes: Negative for discharge, double vision, pain and photophobia.   Cardiovascular: Positive for chest pain, dyspnea on exertion, irregular heartbeat and palpitations. Negative for claudication, cyanosis, leg swelling, near-syncope, orthopnea, paroxysmal nocturnal dyspnea and syncope.   Respiratory: Positive for shortness of breath. Negative for cough, hemoptysis, sputum production and wheezing.    Endocrine: Negative for cold intolerance, heat intolerance, polydipsia, polyphagia and polyuria.   Hematologic/Lymphatic: Negative for adenopathy and bleeding problem. Does not bruise/bleed easily.   Skin: Negative for color change, flushing, itching and rash.   Musculoskeletal: Negative for muscle cramps, muscle weakness, myalgias and stiffness.   Gastrointestinal: Negative for abdominal pain, diarrhea, hematemesis, hematochezia, nausea and vomiting.   Genitourinary: Negative for dysuria, frequency and nocturia.   Neurological: Negative for focal weakness, loss of balance, numbness, paresthesias and seizures.   Psychiatric/Behavioral: Negative for altered mental status, hallucinations and suicidal ideas.   Allergic/Immunologic: Negative for HIV exposure, hives and persistent infections.           Current Outpatient Medications:   •  ARIPiprazole (ABILIFY) 5 MG tablet, Take 5 mg by mouth Daily., Disp: , Rfl:   •  calcipotriene-betamethasone (TACLONEX) 0.005-0.064 % ointment, calcipotriene-betamethasone 0.005 %-0.064 % topical ointment  APPLY ONE TIME A DAY    TO AFFECTED AREA as needed, Disp: , Rfl:   •  clonazePAM (KlonoPIN) 1 MG tablet, TAKE 1/2 TO 1 TAB UP TO THREE  TIMES A DAY AS NEEDED FOR ANXIETY., Disp: , Rfl:   •  Diclofenac Sodium (VOLTAREN) 1 % gel gel, diclofenac 1 % topical gel  apply 2 grams to the affected areas 4 times daily, Disp: , Rfl:   •  fenofibrate 160 MG tablet, Take 160 mg by mouth Daily., Disp: , Rfl:   •  fluticasone-salmeterol (ADVAIR DISKUS) 250-50 MCG/DOSE DISKUS, Inhale 1 puff 2 (Two) Times a Day., Disp: 60 each, Rfl: 5  •  HYDROcodone-acetaminophen (NORCO) 7.5-325 MG per tablet, , Disp: , Rfl:   •  insulin aspart (novoLOG FLEXPEN) 100 UNIT/ML solution pen-injector sc pen, Inject  under the skin into the appropriate area as directed 3 (Three) Times a Day With Meals. SSI, Disp: , Rfl:   •  Insulin Glargine (BASAGLAR KWIKPEN) 100 UNIT/ML injection pen, Inject 10 Units under the skin into the appropriate area as directed Every Night., Disp: , Rfl:   •  isosorbide dinitrate (ISORDIL) 30 MG tablet, Take 30 mg by mouth 4 (Four) Times a Day., Disp: , Rfl:   •  ketoconazole (NIZORAL) 2 % cream, ketoconazole 2 % topical cream  APPLY TO AFFECTED AREA EVERY DAY, Disp: , Rfl:   •  losartan (COZAAR) 50 MG tablet, Take 1 tablet by mouth Daily., Disp: 90 tablet, Rfl: 3  •  methocarbamol (ROBAXIN) 500 MG tablet, Take 1,000 mg by mouth 2 (Two) Times a Day. Patient takes 2 tablets 2 times a day., Disp: , Rfl:   •  metoprolol tartrate (LOPRESSOR) 50 MG tablet, Take 1 tablet by mouth 2 (Two) Times a Day., Disp: 180 tablet, Rfl: 3  •  nitrofurantoin (MACRODANTIN) 100 MG capsule, Take 1 capsule by mouth Daily., Disp: 30 capsule, Rfl: 5  •  nitroglycerin (NITROSTAT) 0.4 MG SL tablet, 1 under the tongue as needed for angina, may repeat q5mins for up three doses (Patient taking differently: Place 0.4 mg under the tongue Every 5 (Five) Minutes As Needed (pt states has never taken this). 1 under the tongue as needed for angina, may repeat q5mins for up three doses), Disp: 100 tablet, Rfl: 11  •  nystatin-triamcinolone (MYCOLOG) 363627-7.1 UNIT/GM-% ointment, Apply  topically to  "the appropriate area as directed 2 (Two) Times a Day. Apply to intertriginous folds and external genitalia as needed, Disp: 45 g, Rfl: 3  •  rosuvastatin (CRESTOR) 10 MG tablet, Take 10 mg by mouth Daily., Disp: , Rfl:   •  SUMAtriptan (IMITREX) 50 MG tablet, TAKE 1 TAB AT ONSET OF HA & MAY TAKE 1 TAB 2 HR IF NOT BETTER DONT TAKE MORE THAN 2 TAB IN 24 HR, Disp: , Rfl:   •  triamcinolone (KENALOG) 0.1 % cream, , Disp: , Rfl:   •  venlafaxine XR (EFFEXOR-XR) 150 MG 24 hr capsule, TAKE 1 CAPSULE TWICE DAILY (Patient taking differently: Take 150 mg by mouth 2 (Two) Times a Day.), Disp: 180 capsule, Rfl: 3    Objective:   Vitals signs and nursing note reviewed.   Constitutional:       Appearance: Healthy appearance. Not in distress.   Neck:      Vascular: No JVR. JVD normal.   Pulmonary:      Effort: Pulmonary effort is normal.      Breath sounds: Normal breath sounds. No wheezing. No rhonchi. No rales.   Chest:      Chest wall: Not tender to palpatation.   Cardiovascular:      PMI at left midclavicular line. Normal rate. Regular rhythm. Normal S1. Normal S2.      Murmurs: There is no murmur.      No gallop. No click. No rub.   Pulses:     Intact distal pulses.   Edema:     Peripheral edema absent.   Abdominal:      General: Bowel sounds are normal.      Palpations: Abdomen is soft.      Tenderness: There is no abdominal tenderness.   Musculoskeletal: Normal range of motion.         General: No tenderness.   Skin:     General: Skin is warm and dry.   Neurological:      General: No focal deficit present.      Mental Status: Alert and oriented to person, place and time.       Blood pressure 120/80, pulse 90, resp. rate 16, height 167.6 cm (66\"), weight 80.3 kg (177 lb), not currently breastfeeding.   Lab Review:     Assessment:       1. Essential hypertension  Acceptable blood pressure control.    2. Type 2 diabetes mellitus without complication, with long-term current use of insulin (CMS/[facility])  Typical obesity related " insulin resistance.    3. Tobacco abuse  Ongoing daily tobacco abuse    4. Precordial pain  Atypical chest pain.  Multiple risk factors for coronary artery disease.  No prior ischemic evaluation.    5. Dyslipidemia  This is followed closely by her primary care provider.    6. Palpitations  Some of the patient's symptoms could be due to underlying arrhythmia and/or ectopy.  She has never worn an outpatient heart monitor.      ECG 12 Lead    Date/Time: 12/23/2020 11:09 AM  Performed by: Juma García MD  Authorized by: Juma García MD   Previous ECG: no previous ECG available  Rhythm: sinus rhythm  Rate: normal  Conduction: left bundle branch block  QRS axis: left    Clinical impression: abnormal EKG            Plan:     The patient has been counseled regarding the essential need to discontinue cigarette smoking.  I have recommended a vasodilator nuclear stress test utilizing a 2-day imaging protocol with both supine and prone stress images in order to mitigate for attenuation artifact which is anticipated given her body habitus.  I have recommended an echocardiogram as well as an outpatient cardiac monitor.  No changes to her medications have been made at today's visit.  Further recommendations will be predicated on the results of her outpatient testing.

## 2020-12-24 ENCOUNTER — OFFICE VISIT (OUTPATIENT)
Dept: ENDOCRINOLOGY | Facility: CLINIC | Age: 75
End: 2020-12-24

## 2020-12-24 VITALS
BODY MASS INDEX: 28.32 KG/M2 | WEIGHT: 176.2 LBS | HEIGHT: 66 IN | HEART RATE: 74 BPM | OXYGEN SATURATION: 92 % | DIASTOLIC BLOOD PRESSURE: 72 MMHG | SYSTOLIC BLOOD PRESSURE: 154 MMHG

## 2020-12-24 DIAGNOSIS — IMO0002 DIABETES MELLITUS TYPE 2, UNCONTROLLED, WITH COMPLICATIONS: Primary | ICD-10-CM

## 2020-12-24 LAB
EXPIRATION DATE: ABNORMAL
EXPIRATION DATE: NORMAL
GLUCOSE BLDC GLUCOMTR-MCNC: 176 MG/DL (ref 70–130)
HBA1C MFR BLD: 8.9 %
Lab: ABNORMAL
Lab: NORMAL

## 2020-12-24 PROCEDURE — 83036 HEMOGLOBIN GLYCOSYLATED A1C: CPT | Performed by: INTERNAL MEDICINE

## 2020-12-24 PROCEDURE — 99204 OFFICE O/P NEW MOD 45 MIN: CPT | Performed by: INTERNAL MEDICINE

## 2020-12-24 PROCEDURE — 82947 ASSAY GLUCOSE BLOOD QUANT: CPT | Performed by: INTERNAL MEDICINE

## 2020-12-24 RX ORDER — ARIPIPRAZOLE 2 MG/1
5 TABLET ORAL DAILY
COMMUNITY
End: 2021-09-07 | Stop reason: SDUPTHER

## 2020-12-24 NOTE — PROGRESS NOTES
Chief Complaint   Patient presents with   • Establish Care   • Diabetes        New patient who is being seen in consultation regarding uncontrolled type 2 diabetes at the request of Murtaza Elder MD     HPI   Katharine Fitzpatrick is a 75 y.o. female who presents for evaluation of uncontrolled diabetes.    Patient has a history of type 2 diabetes.  This was initially diagnosed approximately 10 years ago after patient presented for routine labs.  Patient was initially started on metformin which was stopped due to suboptimal glucose control.  Patient is currently taking novolog which she has been taking via correctional scale after each meal.  She estimates using an average of 10 to 12 units with each meal.  She reports that she frequently does not eat lunch.  She reports that she is taking basaglar occasionally due to concern for higher blood sugar.  This regimen was last changed several years ago.  Patient reports intermittent adherence to medications.  Patient reports that she is using Basaglar on an as-needed basis, taking this only if she knows she is going to be out and eat a higher carbohydrate meal than usual.  She has not taken this in the past week.    Patient denies any history of hypoglycemia or symptoms concerning for hypoglycemia.   Patient monitors blood glucose 3-4 times daily.  No glucose data available for review today, patient reports no values less than 100, maximally 431  Patient does not follow a diabetic diet.  Patient reports that she does not want to limit or count carbohydrates.  Patient does not exercise regularly.     Patient's last dilated eye exam was 1 year ago  Patient denies acute visual changes.  Patient denies foot related concerns such as numbness, tingling, or pain.  History of dyslipidemia: Yes, patient takes fenofibrate and rosuvastatin  History of renal dysfunction: No    Past Medical History:   Diagnosis Date   • Anesthesia 02/14/2020    Patient reported she is slow to wake  "after anesthesia   • Angina at rest (CMS/HCC)    • Anxiety    • Arthritis    • Asthma    • Body piercing     both ears   • Cataract, bilateral     s/p surgery   • Colon polyp     adenomatous polyps   • Depression    • Diabetes mellitus (CMS/HCC)    • Elevated cholesterol    • Frequent UTI    • Headache    • Heart murmur    • History of chest pain 02/14/2020    Patient reported \"they think it's nerves\" and reported last episode was 6-7 months ago   • Hyperlipidemia    • Hypertension    • Migraines    • Peptic ulceration    • RA (rheumatoid arthritis) (CMS/HCC)    • Skin cancer     face   • Type 2 diabetes mellitus (CMS/HCC)    • Urinary tract infection    • Wears dentures     full set   • Wears glasses      Past Surgical History:   Procedure Laterality Date   • ABDOMINAL HYSTERECTOMY  2007   • APPENDECTOMY     • COLONOSCOPY  2015?   • COLONOSCOPY N/A 2/17/2020    Procedure: COLONOSCOPY WITH COLD FORCEP POLYPECTOMY;  Surgeon: Belkis Peraza MD;  Location: [facility] ENDOSCOPY;  Service: Gastroenterology;  Laterality: N/A;   • COLONOSCOPY W/ POLYPECTOMY     • LAPAROSCOPIC CHOLECYSTECTOMY  2010   • MOUTH SURGERY      full extraction of teeth   • SALPINGO OOPHORECTOMY Bilateral 2009   • SKIN BIOPSY     • SPINAL FUSION  2002      Family History   Problem Relation Age of Onset   • Uterine cancer Mother         widely metastatic   • Breast cancer Mother    • Arthritis Mother    • Heart attack Father    • Arthritis Father    • Hypertension Father    • Diabetes Other       Social History     Socioeconomic History   • Marital status:      Spouse name: Not on file   • Number of children: Not on file   • Years of education: Not on file   • Highest education level: Not on file   Tobacco Use   • Smoking status: Current Every Day Smoker     Packs/day: 0.50     Years: 56.00     Pack years: 28.00     Types: Cigarettes   • Smokeless tobacco: Never Used   • Tobacco comment: slowing down   Substance and Sexual Activity   • Alcohol " use: Yes     Comment: occa   • Drug use: No   • Sexual activity: Defer      Allergies   Allergen Reactions   • Penicillins Rash      Current Outpatient Medications on File Prior to Visit   Medication Sig Dispense Refill   • ARIPiprazole (ABILIFY) 2 MG tablet Take 2 mg by mouth Daily.     • calcipotriene-betamethasone (TACLONEX) 0.005-0.064 % ointment Apply 1 application topically to the appropriate area as directed 4 (Four) Times a Day.     • clonazePAM (KlonoPIN) 1 MG tablet Take 1 mg by mouth 3 (Three) Times a Day As Needed.     • Diclofenac Sodium (VOLTAREN) 1 % gel gel Apply 4 g topically to the appropriate area as directed As Needed.     • fenofibrate 160 MG tablet Take 160 mg by mouth Daily.     • fluticasone-salmeterol (ADVAIR DISKUS) 250-50 MCG/DOSE DISKUS Inhale 1 puff 2 (Two) Times a Day. (Patient taking differently: Inhale 1 puff Daily.) 60 each 5   • HYDROcodone-acetaminophen (NORCO) 7.5-325 MG per tablet Take 1 tablet by mouth Daily.     • isosorbide dinitrate (ISORDIL) 30 MG tablet Take 30 mg by mouth Daily.     • ketoconazole (NIZORAL) 2 % cream Apply 1 application topically to the appropriate area as directed Daily.     • losartan (COZAAR) 50 MG tablet Take 1 tablet by mouth Daily. 90 tablet 3   • methocarbamol (ROBAXIN) 500 MG tablet Take 1,000 mg by mouth 2 (two) times a day. Patient takes 2 tablets 2 times a day.      • metoprolol tartrate (LOPRESSOR) 50 MG tablet Take 1 tablet by mouth 2 (Two) Times a Day. 180 tablet 3   • nitroglycerin (NITROSTAT) 0.4 MG SL tablet 1 under the tongue as needed for angina, may repeat q5mins for up three doses (Patient taking differently: Place 0.4 mg under the tongue Every 5 (Five) Minutes As Needed (pt states has never taken this). 1 under the tongue as needed for angina, may repeat q5mins for up three doses) 100 tablet 11   • nystatin-triamcinolone (MYCOLOG) 995476-2.1 UNIT/GM-% ointment Apply  topically to the appropriate area as directed 2 (Two) Times a Day.  Apply to intertriginous folds and external genitalia as needed (Patient taking differently: Apply 1 application topically to the appropriate area as directed 2 (Two) Times a Day. Apply to intertriginous folds and external genitalia as needed) 45 g 3   • rosuvastatin (CRESTOR) 10 MG tablet Take 10 mg by mouth Daily.     • SUMAtriptan (IMITREX) 50 MG tablet Take 50 mg by mouth As Needed.     • triamcinolone (KENALOG) 0.1 % cream Apply 1 application topically to the appropriate area as directed Daily.     • venlafaxine XR (EFFEXOR-XR) 150 MG 24 hr capsule TAKE 1 CAPSULE TWICE DAILY (Patient taking differently: Take 150 mg by mouth 2 (Two) Times a Day.) 180 capsule 3   • [DISCONTINUED] insulin aspart (novoLOG FLEXPEN) 100 UNIT/ML solution pen-injector sc pen Inject 16 Units under the skin into the appropriate area as directed 3 (Three) Times a Day With Meals. SSI     • Insulin Glargine (BASAGLAR KWIKPEN) 100 UNIT/ML injection pen Inject 10 Units under the skin into the appropriate area as directed Every Night.     • [DISCONTINUED] ARIPiprazole (ABILIFY) 5 MG tablet Take 5 mg by mouth Daily.     • [DISCONTINUED] nitrofurantoin (MACRODANTIN) 100 MG capsule Take 1 capsule by mouth Daily. 30 capsule 5     No current facility-administered medications on file prior to visit.        Review of Systems   Constitutional: Negative for fatigue and unexpected weight gain.   HENT: Negative for trouble swallowing and voice change.    Eyes: Negative for pain and visual disturbance.   Respiratory: Positive for cough. Negative for shortness of breath.    Cardiovascular: Negative for chest pain and palpitations.   Gastrointestinal: Negative for constipation and diarrhea.   Endocrine: Negative for polydipsia and polyuria.   Musculoskeletal: Negative for arthralgias and myalgias.   Skin: Negative for rash and skin lesions.   Neurological: Negative for tremors and light-headedness.   Psychiatric/Behavioral: Negative for sleep disturbance.  "The patient is not nervous/anxious.       Vitals:    12/24/20 0844   BP: 154/72   Pulse: 74   SpO2: 92%   Weight: 79.9 kg (176 lb 3.2 oz)   Height: 167.6 cm (66\")   Body mass index is 28.44 kg/m².     Physical Exam  Vitals signs reviewed.   Constitutional:       General: She is awake. She is not in acute distress.  HENT:      Head: Normocephalic and atraumatic.      Right Ear: Hearing normal.      Left Ear: Hearing normal.      Nose: Nose normal.   Eyes:      General: Lids are normal.      Conjunctiva/sclera: Conjunctivae normal.   Neck:      Thyroid: No thyromegaly or thyroid tenderness.   Cardiovascular:      Rate and Rhythm: Normal rate and regular rhythm.      Heart sounds: No murmur.   Pulmonary:      Effort: Pulmonary effort is normal.      Breath sounds: Normal breath sounds and air entry.   Abdominal:      General: Abdomen is flat. Bowel sounds are normal.      Palpations: Abdomen is soft.      Tenderness: There is no abdominal tenderness.   Lymphadenopathy:      Head:      Right side of head: No submandibular adenopathy.      Left side of head: No submandibular adenopathy.      Cervical: No cervical adenopathy.   Skin:     General: Skin is warm and dry.      Findings: No rash.   Neurological:      General: No focal deficit present.      Mental Status: She is alert.      Deep Tendon Reflexes: Reflexes are normal and symmetric.   Psychiatric:         Mood and Affect: Mood and affect normal.         Behavior: Behavior is cooperative.        Labs/Imaging  Results for CONOR LARA (MRN [id number]) as of 12/24/2020 09:09   Ref. Range 12/24/2020 08:59 12/24/2020 08:59   Glucose Latest Ref Range: 70 - 130 mg/dL 176 (A)    Hemoglobin A1C Latest Units: %  8.9       Assessment and Plan    Diagnoses and all orders for this visit:    1. Diabetes mellitus type 2, uncontrolled, with complications (CMS/[facility]) (Primary)  -Uncontrolled with hemoglobin A1c 8.9%  -Patient with wide variation in blood glucose per " report.  -Reviewed insulin physiology with patient.  We will plan to redistribute her current insulin.  -Patient to start Basaglar 10 units  -Patient to start NovoLog 5 units with meals plus correction 1 unit for every 50 greater than 200.  Patient was advised to monitor blood sugar 3 times daily before meals and anytime she is not feeling well.  Patient was instructed to bring glucometer to all future appointments. Patient should contact the clinic between appointments with hypoglycemia or persistent hyperglycemia.  Discussed signs and symptoms of hypoglycemia as well as hypoglycemia management via the rule of 15's.  Discussed potential for long-term complications with uncontrolled diabetes including nephropathy, neuropathy, retinopathy, increased risk for cardiac disease.  Discussed the role of diet and exercise in the management of diabetes.  Eye exam due, patient to schedule  EGFR 70 in December 2020 we will plan to update other screening labs next visit  -     POC Glucose, Blood  -     POC Glycosylated Hemoglobin (Hb A1C)    Other orders  -     insulin aspart (novoLOG FLEXPEN) 100 UNIT/ML solution pen-injector sc pen; For use at mealtimes and per correctional scale, up to 20 units daily  Dispense: 6 mL; Refill: 5         Return in about 3 months (around 3/24/2021). The patient was instructed to contact the clinic with any interval questions or concerns.    Ramonita Landry MD     Please note that portions of this document were completed using a voice recognition program. Efforts were made to edit the dictations, but occasionally words are mis-transcribed.

## 2021-01-06 ENCOUNTER — APPOINTMENT (OUTPATIENT)
Dept: GENERAL RADIOLOGY | Facility: HOSPITAL | Age: 76
End: 2021-01-06

## 2021-01-06 ENCOUNTER — TELEPHONE (OUTPATIENT)
Dept: CARDIOLOGY | Facility: CLINIC | Age: 76
End: 2021-01-06

## 2021-01-06 ENCOUNTER — HOSPITAL ENCOUNTER (EMERGENCY)
Facility: HOSPITAL | Age: 76
Discharge: HOME | End: 2021-01-06
Attending: STUDENT IN AN ORGANIZED HEALTH CARE EDUCATION/TRAINING PROGRAM | Admitting: STUDENT IN AN ORGANIZED HEALTH CARE EDUCATION/TRAINING PROGRAM

## 2021-01-06 VITALS
HEART RATE: 56 BPM | WEIGHT: 177 LBS | HEIGHT: 66 IN | SYSTOLIC BLOOD PRESSURE: 151 MMHG | BODY MASS INDEX: 28.45 KG/M2 | TEMPERATURE: 98.6 F | OXYGEN SATURATION: 98 % | DIASTOLIC BLOOD PRESSURE: 84 MMHG | RESPIRATION RATE: 20 BRPM

## 2021-01-06 DIAGNOSIS — J98.01 BRONCHOSPASM, ACUTE: Primary | ICD-10-CM

## 2021-01-06 DIAGNOSIS — Z72.0 TOBACCO ABUSE: ICD-10-CM

## 2021-01-06 LAB
A-A DO2: 25.1 MMHG
ALBUMIN SERPL-MCNC: 3.9 G/DL (ref 3.5–5.2)
ALBUMIN/GLOB SERPL: 1.1 G/DL
ALP SERPL-CCNC: 106 U/L (ref 39–117)
ALT SERPL W P-5'-P-CCNC: 15 U/L (ref 1–33)
ANION GAP SERPL CALCULATED.3IONS-SCNC: 8.5 MMOL/L (ref 5–15)
ARTERIAL PATENCY WRIST A: POSITIVE
AST SERPL-CCNC: 25 U/L (ref 1–32)
ATMOSPHERIC PRESS: 740 MMHG
BASE EXCESS BLDA CALC-SCNC: 5.6 MMOL/L (ref 0–2)
BASOPHILS # BLD AUTO: 0.19 10*3/MM3 (ref 0–0.2)
BASOPHILS NFR BLD AUTO: 1.4 % (ref 0–1.5)
BDY SITE: ABNORMAL
BILIRUB SERPL-MCNC: 0.3 MG/DL (ref 0–1.2)
BUN SERPL-MCNC: 22 MG/DL (ref 8–23)
BUN/CREAT SERPL: 31 (ref 7–25)
CALCIUM SPEC-SCNC: 9.1 MG/DL (ref 8.6–10.5)
CHLORIDE SERPL-SCNC: 95 MMOL/L (ref 98–107)
CO2 SERPL-SCNC: 32.5 MMOL/L (ref 22–29)
COHGB MFR BLD: 5.5 % (ref 0–2)
CREAT SERPL-MCNC: 0.71 MG/DL (ref 0.57–1)
D-LACTATE SERPL-SCNC: 1 MMOL/L (ref 0.5–2)
DEPRECATED RDW RBC AUTO: 41.2 FL (ref 37–54)
EOSINOPHIL # BLD AUTO: 0.49 10*3/MM3 (ref 0–0.4)
EOSINOPHIL NFR BLD AUTO: 3.5 % (ref 0.3–6.2)
ERYTHROCYTE [DISTWIDTH] IN BLOOD BY AUTOMATED COUNT: 12.3 % (ref 12.3–15.4)
GAS FLOW AIRWAY: 3 LPM
GFR SERPL CREATININE-BSD FRML MDRD: 80 ML/MIN/1.73
GLOBULIN UR ELPH-MCNC: 3.4 GM/DL
GLUCOSE SERPL-MCNC: 90 MG/DL (ref 65–99)
HCO3 BLDA-SCNC: 33.7 MMOL/L (ref 22–28)
HCT VFR BLD AUTO: 47.3 % (ref 34–46.6)
HCT VFR BLD CALC: 47.9 %
HGB BLD-MCNC: 15.7 G/DL (ref 12–15.9)
IMM GRANULOCYTES # BLD AUTO: 0.06 10*3/MM3 (ref 0–0.05)
IMM GRANULOCYTES NFR BLD AUTO: 0.4 % (ref 0–0.5)
INHALED O2 CONCENTRATION: 32 %
LYMPHOCYTES # BLD AUTO: 2.96 10*3/MM3 (ref 0.7–3.1)
LYMPHOCYTES NFR BLD AUTO: 21.2 % (ref 19.6–45.3)
MCH RBC QN AUTO: 30 PG (ref 26.6–33)
MCHC RBC AUTO-ENTMCNC: 33.2 G/DL (ref 31.5–35.7)
MCV RBC AUTO: 90.4 FL (ref 79–97)
METHGB BLD QL: 0.8 % (ref 0–1.5)
MODALITY: ABNORMAL
MONOCYTES # BLD AUTO: 1.59 10*3/MM3 (ref 0.1–0.9)
MONOCYTES NFR BLD AUTO: 11.4 % (ref 5–12)
NEUTROPHILS NFR BLD AUTO: 62.1 % (ref 42.7–76)
NEUTROPHILS NFR BLD AUTO: 8.66 10*3/MM3 (ref 1.7–7)
NOTE: ABNORMAL
NRBC BLD AUTO-RTO: 0 /100 WBC (ref 0–0.2)
NT-PROBNP SERPL-MCNC: 731 PG/ML (ref 0–1800)
OXYHGB MFR BLDV: 92.6 % (ref 94–99)
PCO2 BLDA: 62.2 MM HG (ref 35–45)
PCO2 TEMP ADJ BLD: ABNORMAL MM[HG]
PH BLDA: 7.34 PH UNITS (ref 7.3–7.5)
PH, TEMP CORRECTED: ABNORMAL
PLATELET # BLD AUTO: 309 10*3/MM3 (ref 140–450)
PMV BLD AUTO: 9.1 FL (ref 6–12)
PO2 BLDA: 128 MM HG (ref 75–100)
PO2 TEMP ADJ BLD: ABNORMAL MM[HG]
POTASSIUM SERPL-SCNC: 4.3 MMOL/L (ref 3.5–5.2)
PROCALCITONIN SERPL-MCNC: 0.04 NG/ML (ref 0–0.25)
PROT SERPL-MCNC: 7.3 G/DL (ref 6–8.5)
RBC # BLD AUTO: 5.23 10*6/MM3 (ref 3.77–5.28)
SAO2 % BLDCOA: 98.8 % (ref 94–100)
SARS-COV-2 RNA PNL SPEC NAA+PROBE: NOT DETECTED
SODIUM SERPL-SCNC: 136 MMOL/L (ref 136–145)
TROPONIN T SERPL-MCNC: <0.01 NG/ML (ref 0–0.03)
VENTILATOR MODE: ABNORMAL
WBC # BLD AUTO: 13.95 10*3/MM3 (ref 3.4–10.8)

## 2021-01-06 PROCEDURE — 80053 COMPREHEN METABOLIC PANEL: CPT | Performed by: STUDENT IN AN ORGANIZED HEALTH CARE EDUCATION/TRAINING PROGRAM

## 2021-01-06 PROCEDURE — 84484 ASSAY OF TROPONIN QUANT: CPT | Performed by: STUDENT IN AN ORGANIZED HEALTH CARE EDUCATION/TRAINING PROGRAM

## 2021-01-06 PROCEDURE — 25010000002 DEXAMETHASONE SODIUM PHOSPHATE 10 MG/ML SOLUTION: Performed by: STUDENT IN AN ORGANIZED HEALTH CARE EDUCATION/TRAINING PROGRAM

## 2021-01-06 PROCEDURE — 83605 ASSAY OF LACTIC ACID: CPT | Performed by: STUDENT IN AN ORGANIZED HEALTH CARE EDUCATION/TRAINING PROGRAM

## 2021-01-06 PROCEDURE — 94640 AIRWAY INHALATION TREATMENT: CPT

## 2021-01-06 PROCEDURE — 96375 TX/PRO/DX INJ NEW DRUG ADDON: CPT

## 2021-01-06 PROCEDURE — 82375 ASSAY CARBOXYHB QUANT: CPT

## 2021-01-06 PROCEDURE — 82805 BLOOD GASES W/O2 SATURATION: CPT

## 2021-01-06 PROCEDURE — 87635 SARS-COV-2 COVID-19 AMP PRB: CPT | Performed by: STUDENT IN AN ORGANIZED HEALTH CARE EDUCATION/TRAINING PROGRAM

## 2021-01-06 PROCEDURE — 94799 UNLISTED PULMONARY SVC/PX: CPT

## 2021-01-06 PROCEDURE — 25010000002 METHYLPREDNISOLONE PER 125 MG: Performed by: STUDENT IN AN ORGANIZED HEALTH CARE EDUCATION/TRAINING PROGRAM

## 2021-01-06 PROCEDURE — 93005 ELECTROCARDIOGRAM TRACING: CPT | Performed by: STUDENT IN AN ORGANIZED HEALTH CARE EDUCATION/TRAINING PROGRAM

## 2021-01-06 PROCEDURE — 36600 WITHDRAWAL OF ARTERIAL BLOOD: CPT

## 2021-01-06 PROCEDURE — 25010000002 MAGNESIUM SULFATE 2 GM/50ML SOLUTION: Performed by: STUDENT IN AN ORGANIZED HEALTH CARE EDUCATION/TRAINING PROGRAM

## 2021-01-06 PROCEDURE — 83050 HGB METHEMOGLOBIN QUAN: CPT

## 2021-01-06 PROCEDURE — 84145 PROCALCITONIN (PCT): CPT | Performed by: STUDENT IN AN ORGANIZED HEALTH CARE EDUCATION/TRAINING PROGRAM

## 2021-01-06 PROCEDURE — 83880 ASSAY OF NATRIURETIC PEPTIDE: CPT | Performed by: STUDENT IN AN ORGANIZED HEALTH CARE EDUCATION/TRAINING PROGRAM

## 2021-01-06 PROCEDURE — 96365 THER/PROPH/DIAG IV INF INIT: CPT

## 2021-01-06 PROCEDURE — 85025 COMPLETE CBC W/AUTO DIFF WBC: CPT | Performed by: STUDENT IN AN ORGANIZED HEALTH CARE EDUCATION/TRAINING PROGRAM

## 2021-01-06 PROCEDURE — 99283 EMERGENCY DEPT VISIT LOW MDM: CPT

## 2021-01-06 PROCEDURE — 71045 X-RAY EXAM CHEST 1 VIEW: CPT

## 2021-01-06 RX ORDER — ALBUTEROL SULFATE 90 UG/1
2 AEROSOL, METERED RESPIRATORY (INHALATION)
Status: DISCONTINUED | OUTPATIENT
Start: 2021-01-06 | End: 2021-01-06 | Stop reason: HOSPADM

## 2021-01-06 RX ORDER — ALBUTEROL SULFATE 90 UG/1
8 AEROSOL, METERED RESPIRATORY (INHALATION) ONCE
Status: COMPLETED | OUTPATIENT
Start: 2021-01-06 | End: 2021-01-06

## 2021-01-06 RX ORDER — METHYLPREDNISOLONE SODIUM SUCCINATE 125 MG/2ML
125 INJECTION, POWDER, LYOPHILIZED, FOR SOLUTION INTRAMUSCULAR; INTRAVENOUS ONCE
Status: COMPLETED | OUTPATIENT
Start: 2021-01-06 | End: 2021-01-06

## 2021-01-06 RX ORDER — IPRATROPIUM BROMIDE AND ALBUTEROL SULFATE 2.5; .5 MG/3ML; MG/3ML
3 SOLUTION RESPIRATORY (INHALATION) ONCE
Status: DISCONTINUED | OUTPATIENT
Start: 2021-01-06 | End: 2021-01-06

## 2021-01-06 RX ORDER — DEXAMETHASONE SODIUM PHOSPHATE 10 MG/ML
10 INJECTION, SOLUTION INTRAMUSCULAR; INTRAVENOUS ONCE
Status: COMPLETED | OUTPATIENT
Start: 2021-01-06 | End: 2021-01-06

## 2021-01-06 RX ORDER — MAGNESIUM SULFATE HEPTAHYDRATE 40 MG/ML
2 INJECTION, SOLUTION INTRAVENOUS ONCE
Status: COMPLETED | OUTPATIENT
Start: 2021-01-06 | End: 2021-01-06

## 2021-01-06 RX ADMIN — ALBUTEROL SULFATE 2 PUFF: 90 AEROSOL, METERED RESPIRATORY (INHALATION) at 17:18

## 2021-01-06 RX ADMIN — METHYLPREDNISOLONE SODIUM SUCCINATE 125 MG: 125 INJECTION, POWDER, FOR SOLUTION INTRAMUSCULAR; INTRAVENOUS at 14:39

## 2021-01-06 RX ADMIN — MAGNESIUM SULFATE IN WATER 2 G: 40 INJECTION, SOLUTION INTRAVENOUS at 14:38

## 2021-01-06 RX ADMIN — ALBUTEROL SULFATE 8 PUFF: 90 AEROSOL, METERED RESPIRATORY (INHALATION) at 15:32

## 2021-01-06 RX ADMIN — DEXAMETHASONE SODIUM PHOSPHATE 10 MG: 10 INJECTION, SOLUTION INTRAMUSCULAR; INTRAVENOUS at 17:17

## 2021-01-06 NOTE — ED PROVIDER NOTES
"Subjective   75-year-old female who presents to the emergency department from Dr. Kirby's cardiology office with concerns for shortness of breath.  Dr. Kirby came over to speak to me about her and states that she came to his office for an echo where they noted that she was extremely short of breath and she was 85% on room air.  Patient states she does have home oxygen that she uses as needed.  She states this was prescribed about 6 months ago but cannot tell me which doctor prescribed it.  Patient denies history of COPD or asthma.  He does report that she has been treated for a chest cold with steroids and antibiotics for approximately 1 week.  Currently symptoms are worse with exertion and better since we have placed oxygen on her.  Patient denies fever, chills or sweats.  She has no chest pain.  She has no nausea, vomiting or diarrhea.          Review of Systems   All other systems reviewed and are negative.      Past Medical History:   Diagnosis Date   • Anesthesia 02/14/2020    Patient reported she is slow to wake after anesthesia   • Angina at rest (CMS/[facility])    • Anxiety    • Arthritis    • Asthma    • Body piercing     both ears   • Cataract, bilateral     s/p surgery   • Colon polyp     adenomatous polyps   • Depression    • Diabetes mellitus (CMS/HCC)    • Elevated cholesterol    • Frequent UTI    • Headache    • Heart murmur    • History of chest pain 02/14/2020    Patient reported \"they think it's nerves\" and reported last episode was 6-7 months ago   • Hyperlipidemia    • Hypertension    • Migraines    • Peptic ulceration    • RA (rheumatoid arthritis) (CMS/HCC)    • Skin cancer     face   • Type 2 diabetes mellitus (CMS/HCC)    • Urinary tract infection    • Wears dentures     full set   • Wears glasses        Allergies   Allergen Reactions   • Penicillins Rash       Past Surgical History:   Procedure Laterality Date   • ABDOMINAL HYSTERECTOMY  2007   • APPENDECTOMY     • COLONOSCOPY  2015?   • COLONOSCOPY " N/A 2/17/2020    Procedure: COLONOSCOPY WITH COLD FORCEP POLYPECTOMY;  Surgeon: Belkis Peraza MD;  Location: [facility] ENDOSCOPY;  Service: Gastroenterology;  Laterality: N/A;   • COLONOSCOPY W/ POLYPECTOMY     • LAPAROSCOPIC CHOLECYSTECTOMY  2010   • MOUTH SURGERY      full extraction of teeth   • SALPINGO OOPHORECTOMY Bilateral 2009   • SKIN BIOPSY     • SPINAL FUSION  2002       Family History   Problem Relation Age of Onset   • Uterine cancer Mother         widely metastatic   • Breast cancer Mother    • Arthritis Mother    • Heart attack Father    • Arthritis Father    • Hypertension Father    • Diabetes Other        Social History     Socioeconomic History   • Marital status:      Spouse name: Not on file   • Number of children: Not on file   • Years of education: Not on file   • Highest education level: Not on file   Tobacco Use   • Smoking status: Current Every Day Smoker     Packs/day: 0.50     Years: 56.00     Pack years: 28.00     Types: Cigarettes   • Smokeless tobacco: Never Used   • Tobacco comment: slowing down   Substance and Sexual Activity   • Alcohol use: Not Currently     Comment: occa   • Drug use: No   • Sexual activity: Defer           Objective   Physical Exam  Vitals signs and nursing note reviewed.     GEN: No acute distress  Head: Normocephalic, atraumatic  Eyes: Pupils equal round reactive to light  ENT: Posterior pharynx normal in appearance, oral mucosa is moist  Chest: Nontender to palpation  Cardiovascular: Regular rate  Lungs: Diminished breath sounds bilaterally with faint wheezes abdomen: Soft, nontender, nondistended, no peritoneal signs  Neuro: GCS 15  Psych: Mood and affect are appropriate    Procedures           ED Course  ED Course as of Jan 06 1652 Wed Jan 06, 2021   1402 EKG shows sinus rhythm with a rate of 66.  Abnormal left axis deviation.  Left bundle branch block.  Nonspecific T waves.  Abnormal EKG.  Interpreted by me.    [DT]   1506 PROCEDURE: XR CHEST 1  VW-     HISTORY: sob, hypoxia     COMPARISON: August 19, 2020.     FINDINGS: The heart is normal in size. The mediastinum is unremarkable.  The lungs are clear. There is no pneumothorax.  There are no acute  osseous abnormalities.     IMPRESSION:  No acute cardiopulmonary process.     Continued followup is recommended.     This report was finalized on 1/6/2021 2:14 PM by Leona Hammonds M.D..    [DT]   1626 Procalcitonin: 0.04 [DT]   1626 proBNP: 731.0 [DT]   1626 Troponin T: <0.010 [DT]   1626 Lactate: 1.0 [DT]   1626 COVID19: Not Detected [DT]   1626 pH, Arterial: 7.342 [DT]   1626 pCO2, Arterial(!!): 62.2 [DT]   1626 pO2, Arterial(!): 128.0 [DT]   1626 HCO3, Arterial(!): 33.7 [DT]   1626 WBC(!): 13.95 [DT]   1626 Hemoglobin: 15.7 [DT]   1626 Hematocrit(!): 47.3 [DT]   1626 Platelets: 309 [DT]   1650 Patient currently has her symptoms under control.  I did stress that she should use her oxygen as needed.  We did discuss use of albuterol every 4-6 hours.  She was given dexamethasone here IV.  Patient is oxygen sats when I left the room more 100% on 2 L.  Tell her she likely would not need the extra oxygen at home, but to use it if she did.  Did stress need for follow-up with her primary care in 3 days if not improving.    [DT]      ED Course User Index  [DT] Cameron Padgett MD                                           MDM  Number of Diagnoses or Management Options  Bronchospasm, acute:   Tobacco abuse:   Diagnosis management comments: 35 minutes critical care time was spent by the attending physician excluding separately billable procedures      Patient was placed on submental oxygen at 4 L/min.  She was treated with magnesium, IV steroids, and albuterol with good symptom control.       Amount and/or Complexity of Data Reviewed  Clinical lab tests: reviewed  Decide to obtain previous medical records or to obtain history from someone other than the patient: yes  Obtain history from someone other than the  patient: yes  Review and summarize past medical records: yes  Independent visualization of images, tracings, or specimens: yes        Final diagnoses:   Bronchospasm, acute   Tobacco abuse            Cameron Padgett MD  01/06/21 6039       Cameron Padgett MD  01/06/21 [id number]

## 2021-01-06 NOTE — TELEPHONE ENCOUNTER
Patient came for appointment for ECHO and HOlter monitor, after coming back to ECHO pt C/O more SOA, pulse ox was 85%. Dr. Kirby and Camera took pt over for evaluation.

## 2021-01-19 ENCOUNTER — APPOINTMENT (OUTPATIENT)
Dept: NUCLEAR MEDICINE | Facility: HOSPITAL | Age: 76
End: 2021-01-19

## 2021-01-19 ENCOUNTER — HOSPITAL ENCOUNTER (OUTPATIENT)
Dept: NUCLEAR MEDICINE | Facility: HOSPITAL | Age: 76
End: 2021-01-19

## 2021-01-20 ENCOUNTER — APPOINTMENT (OUTPATIENT)
Dept: NUCLEAR MEDICINE | Facility: HOSPITAL | Age: 76
End: 2021-01-20

## 2021-02-09 LAB
BH CV ECHO MEAS - % IVS THICK: 0 %
BH CV ECHO MEAS - % LVPW THICK: 44.4 %
BH CV ECHO MEAS - AO MAX PG (FULL): 7.1 MMHG
BH CV ECHO MEAS - AO MAX PG: 10 MMHG
BH CV ECHO MEAS - AO MEAN PG (FULL): 3 MMHG
BH CV ECHO MEAS - AO MEAN PG: 5 MMHG
BH CV ECHO MEAS - AO ROOT AREA (BSA CORRECTED): 1
BH CV ECHO MEAS - AO ROOT AREA: 2.8 CM^2
BH CV ECHO MEAS - AO ROOT DIAM: 1.9 CM
BH CV ECHO MEAS - AO V2 MAX: 161 CM/SEC
BH CV ECHO MEAS - AO V2 MEAN: 107 CM/SEC
BH CV ECHO MEAS - AO V2 VTI: 31.9 CM
BH CV ECHO MEAS - AVA(I,A): 2.1 CM^2
BH CV ECHO MEAS - AVA(I,D): 2.1 CM^2
BH CV ECHO MEAS - AVA(V,A): 1.8 CM^2
BH CV ECHO MEAS - AVA(V,D): 1.8 CM^2
BH CV ECHO MEAS - BSA(HAYCOCK): 2 M^2
BH CV ECHO MEAS - BSA: 1.9 M^2
BH CV ECHO MEAS - BZI_BMI: 28.6 KILOGRAMS/M^2
BH CV ECHO MEAS - BZI_METRIC_HEIGHT: 167.6 CM
BH CV ECHO MEAS - BZI_METRIC_WEIGHT: 80.3 KG
BH CV ECHO MEAS - EDV(CUBED): 64 ML
BH CV ECHO MEAS - EDV(MOD-SP4): 113 ML
BH CV ECHO MEAS - EDV(TEICH): 70 ML
BH CV ECHO MEAS - EF(CUBED): 65.7 %
BH CV ECHO MEAS - EF(MOD-SP4): 56.6 %
BH CV ECHO MEAS - EF(TEICH): 57.8 %
BH CV ECHO MEAS - ESV(CUBED): 22 ML
BH CV ECHO MEAS - ESV(MOD-SP4): 49 ML
BH CV ECHO MEAS - ESV(TEICH): 29.6 ML
BH CV ECHO MEAS - FS: 30 %
BH CV ECHO MEAS - IVS/LVPW: 1.2
BH CV ECHO MEAS - IVSD: 1.1 CM
BH CV ECHO MEAS - IVSS: 1.1 CM
BH CV ECHO MEAS - LA DIMENSION: 3.7 CM
BH CV ECHO MEAS - LA/AO: 1.9
BH CV ECHO MEAS - LAT PEAK E' VEL: 6.3 CM/SEC
BH CV ECHO MEAS - LATERAL E/E' RATIO: 11.7
BH CV ECHO MEAS - LV DIASTOLIC VOL/BSA (35-75): 59.5 ML/M^2
BH CV ECHO MEAS - LV MASS(C)D: 127.1 GRAMS
BH CV ECHO MEAS - LV MASS(C)DI: 66.9 GRAMS/M^2
BH CV ECHO MEAS - LV MASS(C)S: 99.3 GRAMS
BH CV ECHO MEAS - LV MASS(C)SI: 52.3 GRAMS/M^2
BH CV ECHO MEAS - LV MAX PG: 2.9 MMHG
BH CV ECHO MEAS - LV MEAN PG: 2 MMHG
BH CV ECHO MEAS - LV SYSTOLIC VOL/BSA (12-30): 25.8 ML/M^2
BH CV ECHO MEAS - LV V1 MAX: 85 CM/SEC
BH CV ECHO MEAS - LV V1 MEAN: 57.7 CM/SEC
BH CV ECHO MEAS - LV V1 VTI: 19.8 CM
BH CV ECHO MEAS - LVIDD: 4 CM
BH CV ECHO MEAS - LVIDS: 2.8 CM
BH CV ECHO MEAS - LVLD AP4: 7.8 CM
BH CV ECHO MEAS - LVLS AP4: 7.4 CM
BH CV ECHO MEAS - LVOT AREA (M): 3.5 CM^2
BH CV ECHO MEAS - LVOT AREA: 3.5 CM^2
BH CV ECHO MEAS - LVOT DIAM: 2.1 CM
BH CV ECHO MEAS - LVPWD: 0.9 CM
BH CV ECHO MEAS - LVPWS: 1.3 CM
BH CV ECHO MEAS - MED PEAK E' VEL: 7.2 CM/SEC
BH CV ECHO MEAS - MEDIAL E/E' RATIO: 10.2
BH CV ECHO MEAS - MV A MAX VEL: 99.7 CM/SEC
BH CV ECHO MEAS - MV DEC SLOPE: 233.5 CM/SEC^2
BH CV ECHO MEAS - MV DEC TIME: 0.32 SEC
BH CV ECHO MEAS - MV E MAX VEL: 73.6 CM/SEC
BH CV ECHO MEAS - MV E/A: 0.74
BH CV ECHO MEAS - MV MAX PG: 4.2 MMHG
BH CV ECHO MEAS - MV MEAN PG: 1 MMHG
BH CV ECHO MEAS - MV P1/2T MAX VEL: 74.9 CM/SEC
BH CV ECHO MEAS - MV P1/2T: 94 MSEC
BH CV ECHO MEAS - MV V2 MAX: 102 CM/SEC
BH CV ECHO MEAS - MV V2 MEAN: 53.5 CM/SEC
BH CV ECHO MEAS - MV V2 VTI: 29.1 CM
BH CV ECHO MEAS - MVA P1/2T LCG: 2.9 CM^2
BH CV ECHO MEAS - MVA(P1/2T): 2.3 CM^2
BH CV ECHO MEAS - MVA(VTI): 2.4 CM^2
BH CV ECHO MEAS - PA MAX PG: 4.4 MMHG
BH CV ECHO MEAS - PA V2 MAX: 105 CM/SEC
BH CV ECHO MEAS - RAP SYSTOLE: 10 MMHG
BH CV ECHO MEAS - RVSP: 36 MMHG
BH CV ECHO MEAS - SI(AO): 47.6 ML/M^2
BH CV ECHO MEAS - SI(CUBED): 22.1 ML/M^2
BH CV ECHO MEAS - SI(LVOT): 36.1 ML/M^2
BH CV ECHO MEAS - SI(MOD-SP4): 33.7 ML/M^2
BH CV ECHO MEAS - SI(TEICH): 21.3 ML/M^2
BH CV ECHO MEAS - SV(AO): 90.4 ML
BH CV ECHO MEAS - SV(CUBED): 42 ML
BH CV ECHO MEAS - SV(LVOT): 68.6 ML
BH CV ECHO MEAS - SV(MOD-SP4): 64 ML
BH CV ECHO MEAS - SV(TEICH): 40.4 ML
BH CV ECHO MEAS - TR MAX PG: 26 MMHG
BH CV ECHO MEAS - TR MAX VEL: 252.5 CM/SEC
BH CV ECHO MEAS - TV MAX PG: 0.81 MMHG
BH CV ECHO MEAS - TV V2 MAX: 44.4 CM/SEC
BH CV ECHO MEASUREMENTS AVERAGE E/E' RATIO: 10.9
LV EF 2D ECHO EST: 66 %

## 2021-03-05 ENCOUNTER — TELEPHONE (OUTPATIENT)
Dept: CARDIOLOGY | Facility: CLINIC | Age: 76
End: 2021-03-05

## 2021-03-10 ENCOUNTER — OFFICE VISIT (OUTPATIENT)
Dept: CARDIOLOGY | Facility: CLINIC | Age: 76
End: 2021-03-10

## 2021-03-10 VITALS
DIASTOLIC BLOOD PRESSURE: 72 MMHG | BODY MASS INDEX: 28.61 KG/M2 | OXYGEN SATURATION: 91 % | HEART RATE: 60 BPM | HEIGHT: 66 IN | WEIGHT: 178 LBS | SYSTOLIC BLOOD PRESSURE: 158 MMHG

## 2021-03-10 DIAGNOSIS — J43.2 CENTRILOBULAR EMPHYSEMA (HCC): ICD-10-CM

## 2021-03-10 DIAGNOSIS — Z79.4 TYPE 2 DIABETES MELLITUS WITHOUT COMPLICATION, WITH LONG-TERM CURRENT USE OF INSULIN (HCC): ICD-10-CM

## 2021-03-10 DIAGNOSIS — F31.78 BIPOLAR DISORDER, IN FULL REMISSION, MOST RECENT EPISODE MIXED (HCC): ICD-10-CM

## 2021-03-10 DIAGNOSIS — IMO0002 DIABETES MELLITUS TYPE 2, UNCONTROLLED, WITH COMPLICATIONS: ICD-10-CM

## 2021-03-10 DIAGNOSIS — I10 ESSENTIAL HYPERTENSION: ICD-10-CM

## 2021-03-10 DIAGNOSIS — I27.20 PULMONARY HYPERTENSION (HCC): ICD-10-CM

## 2021-03-10 DIAGNOSIS — Z72.0 TOBACCO ABUSE: Primary | ICD-10-CM

## 2021-03-10 DIAGNOSIS — R00.2 PALPITATIONS: ICD-10-CM

## 2021-03-10 DIAGNOSIS — R07.2 PRECORDIAL PAIN: ICD-10-CM

## 2021-03-10 DIAGNOSIS — E11.9 TYPE 2 DIABETES MELLITUS WITHOUT COMPLICATION, WITH LONG-TERM CURRENT USE OF INSULIN (HCC): ICD-10-CM

## 2021-03-10 PROCEDURE — 99214 OFFICE O/P EST MOD 30 MIN: CPT | Performed by: INTERNAL MEDICINE

## 2021-03-10 RX ORDER — ATORVASTATIN CALCIUM 20 MG/1
TABLET, FILM COATED ORAL
COMMUNITY

## 2021-03-10 RX ORDER — USTEKINUMAB 45 MG/.5ML
INJECTION, SOLUTION SUBCUTANEOUS
COMMUNITY
Start: 2021-02-10 | End: 2021-09-07

## 2021-03-10 RX ORDER — OMEPRAZOLE 20 MG/1
CAPSULE, DELAYED RELEASE ORAL
COMMUNITY
End: 2021-09-07

## 2021-03-10 RX ORDER — AMLODIPINE BESYLATE 5 MG/1
5 TABLET ORAL DAILY
COMMUNITY
Start: 2021-01-09

## 2021-03-10 RX ORDER — FOLIC ACID 1 MG/1
TABLET ORAL
COMMUNITY
Start: 2021-03-08 | End: 2021-09-07

## 2021-03-10 RX ORDER — IPRATROPIUM BROMIDE AND ALBUTEROL SULFATE 2.5; .5 MG/3ML; MG/3ML
SOLUTION RESPIRATORY (INHALATION)
COMMUNITY
Start: 2021-01-11

## 2021-03-10 NOTE — PROGRESS NOTES
"    Subjective:     Encounter Date:03/10/2021      Patient ID: Katharine Fitzpatrick is a 75 y.o. female.    Chief Complaint: Chest pain  HPI  This is a 75-year-old female patient who presents to cardiology clinic for routine follow-up.  The patient underwent an outpatient echocardiogram which showed a normal ejection fraction with no regional wall motion abnormalities.  There was concentric left ventricular hypertrophy, left atrial enlargement, secondary pulmonary hypertension and grade 1 diastolic dysfunction with no evidence of elevated mean left atrial pressure at rest all consistent with hypertensive cardiac disease.  There was no evidence of valvular heart disease or pericardial disease.  The patient also was complaining of palpitations.  An outpatient cardiac monitor showed no evidence of arrhythmia or ectopy.  There was no correlation between the patient's symptoms and underlying arrhythmia or ectopy.  The patient was also scheduled to have an outpatient vasodilator nuclear stress test but canceled as she has an \"irrational fear\" of stress testing.  The patient had a vasodilator nuclear stress test in 2018 which showed no evidence of ischemia or infarction.  The patient indicates that she is not interested in rescheduling the test even after reassuring her of the safety of the procedure.  The patient's chest discomfort, shortness of breath and palpitations have remained unchanged in quality, frequency, duration and intensity since her previous evaluation.  The patient reports noncompliance with her blood pressure medication indicating that she thinks her blood pressure medication is \"doing more harm than good\".  She continues to smoke daily.  The following portions of the patient's history were reviewed and updated as appropriate: allergies, current medications, past family history, past medical history, past social history, past surgical history and problem  Review of Systems   Constitutional: Negative for " chills, diaphoresis, fever, malaise/fatigue, weight gain and weight loss.   HENT: Negative for ear discharge, hearing loss, hoarse voice and nosebleeds.    Eyes: Negative for discharge, double vision, pain and photophobia.   Cardiovascular: Positive for chest pain, dyspnea on exertion and palpitations. Negative for claudication, cyanosis, irregular heartbeat, leg swelling, near-syncope, orthopnea, paroxysmal nocturnal dyspnea and syncope.   Respiratory: Positive for shortness of breath. Negative for cough, hemoptysis, sputum production and wheezing.    Endocrine: Negative for cold intolerance, heat intolerance, polydipsia, polyphagia and polyuria.   Hematologic/Lymphatic: Negative for adenopathy and bleeding problem. Does not bruise/bleed easily.   Skin: Negative for color change, flushing, itching and rash.   Musculoskeletal: Negative for muscle cramps, muscle weakness, myalgias and stiffness.   Gastrointestinal: Negative for abdominal pain, diarrhea, hematemesis, hematochezia, nausea and vomiting.   Genitourinary: Negative for dysuria, frequency and nocturia.   Neurological: Negative for focal weakness, loss of balance, numbness, paresthesias and seizures.   Psychiatric/Behavioral: Negative for altered mental status, hallucinations and suicidal ideas.   Allergic/Immunologic: Negative for HIV exposure, hives and persistent infections.           Current Outpatient Medications:   •  amLODIPine (NORVASC) 5 MG tablet, Take 5 mg by mouth Daily., Disp: , Rfl:   •  ARIPiprazole (ABILIFY) 2 MG tablet, Take 5 mg by mouth Daily., Disp: , Rfl:   •  atorvastatin (LIPITOR) 20 MG tablet, atorvastatin 20 mg tablet  TAKE 1 TABLET BY MOUTH EVERY DAY, Disp: , Rfl:   •  clonazePAM (KlonoPIN) 1 MG tablet, Take 1 mg by mouth 3 (Three) Times a Day As Needed., Disp: , Rfl:   •  fenofibrate 160 MG tablet, Take 160 mg by mouth Daily., Disp: , Rfl:   •  fluticasone-salmeterol (ADVAIR DISKUS) 250-50 MCG/DOSE DISKUS, Inhale 1 puff 2 (Two)  Times a Day. (Patient taking differently: Inhale 1 puff Daily.), Disp: 60 each, Rfl: 5  •  folic acid (FOLVITE) 1 MG tablet, , Disp: , Rfl:   •  HYDROcodone-acetaminophen (NORCO) 7.5-325 MG per tablet, Take 1 tablet by mouth Daily., Disp: , Rfl:   •  insulin aspart (novoLOG FLEXPEN) 100 UNIT/ML solution pen-injector sc pen, For use at mealtimes and per correctional scale, up to 20 units daily, Disp: 6 mL, Rfl: 5  •  Insulin Glargine (BASAGLAR KWIKPEN) 100 UNIT/ML injection pen, Inject 10 Units under the skin into the appropriate area as directed Every Night., Disp: , Rfl:   •  ipratropium-albuterol (DUO-NEB) 0.5-2.5 mg/3 ml nebulizer, USE 1 AMPULES IN NEBULIZER FOUR TIMES A DAY AS DIRECTED, Disp: , Rfl:   •  isosorbide dinitrate (ISORDIL) 30 MG tablet, Take 30 mg by mouth Daily., Disp: , Rfl:   •  losartan (COZAAR) 50 MG tablet, Take 1 tablet by mouth Daily., Disp: 90 tablet, Rfl: 3  •  metFORMIN (GLUCOPHAGE) 500 MG tablet, Take  by mouth Every 12 (Twelve) Hours., Disp: , Rfl:   •  methocarbamol (ROBAXIN) 500 MG tablet, Take 1,000 mg by mouth 2 (two) times a day. Patient takes 2 tablets 2 times a day. , Disp: , Rfl:   •  methotrexate 2.5 MG tablet, , Disp: , Rfl:   •  metoprolol tartrate (LOPRESSOR) 50 MG tablet, Take 1 tablet by mouth 2 (Two) Times a Day., Disp: 180 tablet, Rfl: 3  •  nitroglycerin (NITROSTAT) 0.4 MG SL tablet, 1 under the tongue as needed for angina, may repeat q5mins for up three doses (Patient taking differently: Place 0.4 mg under the tongue Every 5 (Five) Minutes As Needed (pt states has never taken this). 1 under the tongue as needed for angina, may repeat q5mins for up three doses), Disp: 100 tablet, Rfl: 11  •  omeprazole (priLOSEC) 20 MG capsule, omeprazole 20 mg capsule,delayed release, Disp: , Rfl:   •  Stelara 45 MG/0.5ML solution prefilled syringe Injection, , Disp: , Rfl:   •  SUMAtriptan (IMITREX) 50 MG tablet, Take 50 mg by mouth As Needed., Disp: , Rfl:   •  triamcinolone (KENALOG)  "0.1 % cream, Apply 1 application topically to the appropriate area as directed Daily., Disp: , Rfl:   •  venlafaxine XR (EFFEXOR-XR) 150 MG 24 hr capsule, TAKE 1 CAPSULE TWICE DAILY (Patient taking differently: Take 150 mg by mouth 2 (Two) Times a Day.), Disp: 180 capsule, Rfl: 3    Objective:   Vitals and nursing note reviewed.   Constitutional:       Appearance: Healthy appearance. Not in distress.   Neck:      Vascular: No JVR. JVD normal.   Pulmonary:      Effort: Pulmonary effort is normal.      Breath sounds: Normal breath sounds. No wheezing. No rhonchi. No rales.   Chest:      Chest wall: Not tender to palpatation.   Cardiovascular:      PMI at left midclavicular line. Normal rate. Regular rhythm. Normal S1. Normal S2.      Murmurs: There is no murmur.      No gallop. No click. No rub.   Pulses:     Intact distal pulses.   Edema:     Peripheral edema absent.   Abdominal:      General: Bowel sounds are normal.      Palpations: Abdomen is soft.      Tenderness: There is no abdominal tenderness.   Musculoskeletal: Normal range of motion.         General: No tenderness. Skin:     General: Skin is warm and dry.   Neurological:      General: No focal deficit present.      Mental Status: Alert and oriented to person, place and time.       Blood pressure 158/72, pulse 60, height 167.6 cm (66\"), weight 80.7 kg (178 lb), SpO2 91 %, not currently breastfeeding.   Lab Review:     Assessment:       1. Centrilobular emphysema (CMS/HCC)  Typical tobacco related emphysema.  Stable symptoms.    2. Bipolar disorder, in full remission, most recent episode mixed (CMS/HCC)  The patient's psychiatric disorder is obviously contributing to her irrational fear of stress testing.    3. Diabetes mellitus type 2, uncontrolled, with complications (CMS/HCC)  Typical obesity related insulin resistance.    4. Tobacco abuse  Ongoing tobacco abuse.    5. Essential hypertension  Poor blood pressure control.  The patient admits to medication " noncompliance.    6. Type 2 diabetes mellitus without complication, with long-term current use of insulin (CMS/HCC)  Typical obesity related insulin resistance.    7. Precordial pain  Atypical chest pain.  Patient refuses stress testing.    8. Palpitations  No evidence of arrhythmia or ectopy.    9. Pulmonary hypertension (CMS/HCC)  WHO group 2 and WHO group 3 etiologies.  Stable symptoms.    Procedures    Plan:     The patient has been counseled regarding the essential need to discontinue cigarette smoking.  The patient has been encouraged to maintain strict compliance with her blood pressure medication.  She has been educated that the risk of hypertension and worsening of hypertensive cardiac disease far outweighs any risks of her current blood pressure medication profile.  The patient has been advised that if she changes her mind regarding stress testing we can arrange for this to be scheduled as an outpatient at her convenience.  As previous recommendations, vasodilator nuclear stress testing utilizing a 2-day imaging protocol with supine and prone stress images is the preferred modality given her body habitus.  The patient is unwilling to travel to [facility] for consideration of a dobutamine stress echo which would be an alternate testing modality.  The patient has been referred back to her primary care provider who may or may not have influence regarding her irrational fear of stress testing and blood pressure medication.  The patient has been educated regarding the risks of hypertensive-related cardiac disease with left ventricular hypertrophy, including but not limited to independent risk factor for stroke, independent risk factor for congestive heart failure, independent risk factor for worsening renal function, independent risk factor for hypertension-related retinopathy (especially in conjunction with poorly controlled diabetes) independent of high blood pressure alone.

## 2021-05-20 ENCOUNTER — OFFICE VISIT (OUTPATIENT)
Dept: ENDOCRINOLOGY | Facility: CLINIC | Age: 76
End: 2021-05-20

## 2021-05-20 VITALS
OXYGEN SATURATION: 82 % | WEIGHT: 180.6 LBS | HEIGHT: 66 IN | DIASTOLIC BLOOD PRESSURE: 68 MMHG | BODY MASS INDEX: 29.02 KG/M2 | SYSTOLIC BLOOD PRESSURE: 162 MMHG | HEART RATE: 81 BPM

## 2021-05-20 DIAGNOSIS — Z79.4 TYPE 2 DIABETES MELLITUS WITH HYPERGLYCEMIA, WITH LONG-TERM CURRENT USE OF INSULIN (HCC): Primary | ICD-10-CM

## 2021-05-20 DIAGNOSIS — E11.65 TYPE 2 DIABETES MELLITUS WITH HYPERGLYCEMIA, WITH LONG-TERM CURRENT USE OF INSULIN (HCC): Primary | ICD-10-CM

## 2021-05-20 DIAGNOSIS — E78.5 DYSLIPIDEMIA: ICD-10-CM

## 2021-05-20 LAB
EXPIRATION DATE: ABNORMAL
EXPIRATION DATE: NORMAL
GLUCOSE BLDC GLUCOMTR-MCNC: 176 MG/DL (ref 70–130)
HBA1C MFR BLD: 9.3 %
Lab: ABNORMAL
Lab: NORMAL

## 2021-05-20 PROCEDURE — 83036 HEMOGLOBIN GLYCOSYLATED A1C: CPT | Performed by: INTERNAL MEDICINE

## 2021-05-20 PROCEDURE — 99214 OFFICE O/P EST MOD 30 MIN: CPT | Performed by: INTERNAL MEDICINE

## 2021-05-20 PROCEDURE — 82947 ASSAY GLUCOSE BLOOD QUANT: CPT | Performed by: INTERNAL MEDICINE

## 2021-05-20 NOTE — PROGRESS NOTES
"Chief Complaint   Patient presents with   • Follow-up   • Diabetes     no meter or BS log.          HPI   Katharine Fitzpatrick is a 76 y.o. female had concerns including Follow-up and Diabetes (no meter or BS log.  ).      Patient reports glucoses poorly controlled, she does not have meter or log available for review today. She also reports recent respiratory illness and that she was given oxygen for home use. She is taking metformin regularly but has not been taking insulin as prescribed. She reports that she has both basaglar and novolog at home but has not been taking basaglar. She is also not taking novolog as prescribed, she reports taking approximately 16 units with dinner, no insulin with other meals. She denies hypoglycemia or any particular reason for not taking medication as prescribed.    Patient continues on atorvastatin and fenofibrate for hyperlipidemia, denies myalgias or abdominal pain.    Reviewed low o2- patient reports she has oxygen at home but does not yet have her portable tank. She reports no change in respiratory status and that she has an appointment with a lung specialist.    The following portions of the patient's history were reviewed and updated as appropriate: allergies, current medications and past social history.    Review of Systems   Constitutional: Positive for fatigue.   Respiratory: Positive for wheezing.    Cardiovascular: Negative for palpitations.   Gastrointestinal: Negative for abdominal pain, nausea and vomiting.   Musculoskeletal: Negative for myalgias.      /68   Pulse 81   Ht 167.6 cm (66\")   Wt 81.9 kg (180 lb 9.6 oz)   LMP  (LMP Unknown)   SpO2 (!) 82% Comment: pt has been sick x 1 month.  chest congestion  BMI 29.15 kg/m²      Physical Exam      Constitutional:  well developed; well nourished  no acute distress  appears stated age   ENT/Thyroid: not examined   Eyes: Conjunctiva: clear   Respiratory:  breathing is unlabored  wheezes bilaterally "   Cardiovascular:  regular rate and rhythm   Chest:  Not performed.   Abdomen: soft, non-tender; no masses   : Not performed.   Musculoskeletal: Not performed   Skin: not performed.   Neuro: mental status, speech normal   Psych: mood and affect are within normal limits       Labs/Imaging  Results for CONOR LARA (MRN [id number]) as of 5/20/2021 16:58   Ref. Range 5/20/2021 16:55 5/20/2021 16:55   Glucose Latest Ref Range: 70 - 130 mg/dL 176 (A)    Hemoglobin A1C Latest Units: %  9.3       Diagnoses and all orders for this visit:    1. Type 2 diabetes mellitus with hyperglycemia, with long-term current use of insulin (CMS/[facility]) (Primary)  - uncontrolled, with hyperglycemia  - patient not currently taking medication as prescribed  - reviewed insulin physiology and the need for both basal and short acting insulin  - patient to start basaglar 10 units daily  - patient to start novolog 5 units with meals plus correction 1:50 >150  -continue metformin  - Patient given written instructions for insulin dosing  -Patient was advised to monitor blood sugar 3 times daily.  Patient was instructed to bring glucometer to all future appointments. Patient should contact the clinic between appointments with hypoglycemia or persistent hyperglycemia.  Discussed signs and symptoms of hypoglycemia as well as hypoglycemia management via the rule of 15's.  Discussed potential for long-term complications with uncontrolled diabetes including nephropathy, neuropathy, retinopathy, increased risk for cardiac disease.  Discussed the role of diet and exercise in the management of diabetes.  CMP up to date from 1/21  -     POC Glucose, Blood  -     POC Glycosylated Hemoglobin (Hb A1C)    2. Dyslipidemia  - taking atorvastatin and fenofibrate  - lipid panel next visit.    Discussed with patient to resume her use of supplemental oxygen. She was advised to seek care with worsening respiratory symptoms.    Return in about 3 months (around  8/20/2021) for Next scheduled follow up. The patient was instructed to contact the clinic with any interval questions or concerns.    Ramonita Landry MD   Endocrinologist    Please note that portions of this document were completed with a voice recognition program. Efforts were made to edit the dictations, but occasionally words are mis-transcribed.

## 2021-05-21 RX ORDER — INSULIN GLARGINE 100 [IU]/ML
10 INJECTION, SOLUTION SUBCUTANEOUS NIGHTLY
Qty: 3 ML | Refills: 5 | Status: SHIPPED | OUTPATIENT
Start: 2021-05-21 | End: 2021-07-26

## 2021-06-08 ENCOUNTER — TELEPHONE (OUTPATIENT)
Dept: ENDOCRINOLOGY | Facility: CLINIC | Age: 76
End: 2021-06-08

## 2021-06-08 NOTE — TELEPHONE ENCOUNTER
I contacted pt to reschedule an upcoming appointment.   While on the phone pt stated that she is not sure how much insulin she should be taking.   Please advise

## 2021-06-08 NOTE — TELEPHONE ENCOUNTER
-pt had questions on what dose she should be given herself, in regards to her insulin.    -pt has been given herself 10 units of Novolog, with each meal.  Patient has not been using the correctional scale at all.    -informed pt her dose was 5 units plus with meals plus correctional scale for Novolog.    -pt states she thought 5 units was not enough insulin, therefore she change the dose for Novolog herself.    -pt states she hardly eats, pt eats on big meal (breakfast) and then she does not eat allot during the day.    -pt is taking 10 units of Basaglar daily.    -pt only checking BS x 1 daily.    -advised pt to check her BS x 3 daily, this is what Dr. Landry ask pt to do.    Patient provided the following BS readings:    Tuesday:  306, after eating    Monday:  321 after eating    Sunday:  283, after eating    Saturday:  360, after eating    Dr. Landry, please advise on correctional scale, pt states she does not have the correctional scale.  Pt did not understand 1:50    >150.

## 2021-06-08 NOTE — TELEPHONE ENCOUNTER
Please contact patient. She should check sugar 3 times daily before meals.     Given hyperglycemia on current dosing, would increase basaglar to 15 units. She may continue at novolog 10 units with meals (hold if not eating- only use with full meal) plus correction 1 unit for every 50 above 150. Please review use of correctional scale.

## 2021-06-10 NOTE — TELEPHONE ENCOUNTER
Berry Balderas, with Dr. Landry's recommendations for patient.    -Andrey wrote all information down for pt to follow.

## 2021-07-12 RX ORDER — NYSTATIN AND TRIAMCINOLONE ACETONIDE 100000; 1 [USP'U]/G; MG/G
OINTMENT TOPICAL
Qty: 45 G | Refills: 3 | Status: SHIPPED | OUTPATIENT
Start: 2021-07-12 | End: 2021-09-07

## 2021-07-26 ENCOUNTER — LAB (OUTPATIENT)
Dept: LAB | Facility: HOSPITAL | Age: 76
End: 2021-07-26

## 2021-07-26 ENCOUNTER — OFFICE VISIT (OUTPATIENT)
Dept: ENDOCRINOLOGY | Facility: CLINIC | Age: 76
End: 2021-07-26

## 2021-07-26 VITALS
DIASTOLIC BLOOD PRESSURE: 74 MMHG | WEIGHT: 183 LBS | HEART RATE: 67 BPM | OXYGEN SATURATION: 92 % | BODY MASS INDEX: 29.41 KG/M2 | HEIGHT: 66 IN | SYSTOLIC BLOOD PRESSURE: 152 MMHG

## 2021-07-26 DIAGNOSIS — E78.5 DYSLIPIDEMIA: ICD-10-CM

## 2021-07-26 DIAGNOSIS — Z79.4 TYPE 2 DIABETES MELLITUS WITH HYPERGLYCEMIA, WITH LONG-TERM CURRENT USE OF INSULIN (HCC): Primary | ICD-10-CM

## 2021-07-26 DIAGNOSIS — E11.65 TYPE 2 DIABETES MELLITUS WITH HYPERGLYCEMIA, WITH LONG-TERM CURRENT USE OF INSULIN (HCC): Primary | ICD-10-CM

## 2021-07-26 LAB
EXPIRATION DATE: ABNORMAL
GLUCOSE BLDC GLUCOMTR-MCNC: 138 MG/DL (ref 70–130)
Lab: ABNORMAL

## 2021-07-26 PROCEDURE — 80053 COMPREHEN METABOLIC PANEL: CPT | Performed by: INTERNAL MEDICINE

## 2021-07-26 PROCEDURE — 82043 UR ALBUMIN QUANTITATIVE: CPT | Performed by: INTERNAL MEDICINE

## 2021-07-26 PROCEDURE — 99214 OFFICE O/P EST MOD 30 MIN: CPT | Performed by: INTERNAL MEDICINE

## 2021-07-26 PROCEDURE — 80061 LIPID PANEL: CPT | Performed by: INTERNAL MEDICINE

## 2021-07-26 PROCEDURE — 82570 ASSAY OF URINE CREATININE: CPT | Performed by: INTERNAL MEDICINE

## 2021-07-26 PROCEDURE — 82947 ASSAY GLUCOSE BLOOD QUANT: CPT | Performed by: INTERNAL MEDICINE

## 2021-07-26 RX ORDER — INSULIN GLARGINE 100 [IU]/ML
15 INJECTION, SOLUTION SUBCUTANEOUS DAILY
Qty: 6 ML | Refills: 5 | Status: SHIPPED | OUTPATIENT
Start: 2021-07-26

## 2021-07-26 NOTE — PROGRESS NOTES
"Chief Complaint   Patient presents with   • Follow-up   • Diabetes     pt did not bring meter        HPI   Katharine Fitzpatrick is a 76 y.o. female had concerns including Follow-up and Diabetes (pt did not bring meter).      Patient presents for follow-up of diabetes, prescribed medications include the following: Basaglar 15 units daily, NovoLog 10 units with meals plus correction 1 for 50 greater than 150, Metformin 500 mg twice daily    Patient reports that she has not been taking insulin as prescribed last visit.  She reports that she has been intermittently skipping Basaglar due to fear of hypoglycemia overnight.  Of note, she has not had any hypoglycemia in the interim since last visit.  No glucose data is available for review but she denies any values less than 70.  She reports that last night she took 40 units and that she has been varying dose based on what her sugar is.  She has been taking NovoLog regularly, 10 notes plus correction as needed.  She reports she is taken no more than 14 units.  She reports that she is monitoring blood glucose prior to each meal.    Patient continues on fenofibrate 160 mg daily, atorvastatin 20 mg daily for hyperlipidemia.  She denies myalgias or abdominal pain.    The following portions of the patient's history were reviewed and updated as appropriate: allergies, current medications and past social history.    Review of Systems   Gastrointestinal: Negative for abdominal pain, nausea and vomiting.   Endocrine: Negative for polydipsia and polyuria.   Musculoskeletal: Negative for myalgias.      /74   Pulse 67   Ht 167.6 cm (66\")   Wt 83 kg (183 lb)   LMP  (LMP Unknown)   SpO2 92%   BMI 29.54 kg/m²      Physical Exam      Constitutional:  well developed; well nourished  no acute distress  appears stated age   ENT/Thyroid: not examined   Eyes: Conjunctiva: clear   Respiratory:  breathing is unlabored  clear to auscultation bilaterally   Cardiovascular:  regular rate and " rhythm   Chest:  Not performed.   Abdomen: soft, non-tender; no masses   : Not performed.   Musculoskeletal: Not performed   Skin: dry and warm   Neuro: mental status, speech normal   Psych: mood and affect are within normal limits       Labs/Imaging  Results for CONOR LARA (MRN [id number]) as of 7/26/2021 17:29   Ref. Range 7/26/2021 16:15   Glucose Latest Ref Range: 70 - 130 mg/dL 138 (A)       Diagnoses and all orders for this visit:    1. Type 2 diabetes mellitus with hyperglycemia, with long-term current use of insulin (CMS/[facility]) (Primary)  -Uncontrolled with most recent hemoglobin A1c 9.3%, too soon to repeat today  -Patient reports hyperglycemia at home, no data available for review today.  -We will plan to redistribute current insulin as patient has been holding basal insulin inappropriately  -Patient to resume Basaglar at a dose of 15 units daily  -Patient to reduce NovoLog to 8 units with meals plus correction 1 for 50 greater than 150  -Insulin instructions were reviewed extensively and patient was provided written instructions.  She is to contact the office with any questions.  -Patient to continue Metformin 500 mg twice daily  -Patient was advised to monitor blood sugar 3 times daily.  Patient was instructed to bring glucometer to all future appointments. Patient should contact the clinic between appointments with hypoglycemia or persistent hyperglycemia.  Discussed signs and symptoms of hypoglycemia as well as hypoglycemia management via the rule of 15's.  Discussed potential for long-term complications with uncontrolled diabetes including nephropathy, neuropathy, retinopathy, increased risk for cardiac disease.  Discussed the role of diet and exercise in the management of diabetes.  -Update screening labs today  -     POC Glucose, Blood  -     Cancel: POC Glycosylated Hemoglobin (Hb A1C)  -     Lipid Panel  -     Comprehensive Metabolic Panel  -     Microalbumin / Creatinine Urine Ratio -  Urine, Clean Catch    2.  Dyslipidemia  Patient currently taking fenofibrate 160 mg daily, atorvastatin 20 mg daily  Update lipid panel today    Other orders  -     Insulin Glargine (BASAGLAR KWIKPEN) 100 UNIT/ML injection pen; Inject 15 Units under the skin into the appropriate area as directed Daily.  Dispense: 6 mL; Refill: 5  -     insulin aspart (novoLOG FLEXPEN) 100 UNIT/ML solution pen-injector sc pen; For use at mealtimes and per correctional scale, up to 30 units daily  Dispense: 9 mL; Refill: 5      Return in about 3 months (around 10/26/2021). The patient was instructed to contact the clinic with any interval questions or concerns.    Ramonita Landry MD   Endocrinologist    Please note that portions of this document were completed with a voice recognition program. Efforts were made to edit the dictations, but occasionally words are mis-transcribed.

## 2021-07-27 LAB
ALBUMIN SERPL-MCNC: 4.1 G/DL (ref 3.5–5.2)
ALBUMIN UR-MCNC: 71.5 MG/DL
ALBUMIN/GLOB SERPL: 1.6 G/DL
ALP SERPL-CCNC: 74 U/L (ref 39–117)
ALT SERPL W P-5'-P-CCNC: 16 U/L (ref 1–33)
ANION GAP SERPL CALCULATED.3IONS-SCNC: 6.3 MMOL/L (ref 5–15)
AST SERPL-CCNC: 46 U/L (ref 1–32)
BILIRUB SERPL-MCNC: 0.2 MG/DL (ref 0–1.2)
BUN SERPL-MCNC: 14 MG/DL (ref 8–23)
BUN/CREAT SERPL: 18.4 (ref 7–25)
CALCIUM SPEC-SCNC: 9.3 MG/DL (ref 8.6–10.5)
CHLORIDE SERPL-SCNC: 98 MMOL/L (ref 98–107)
CHOLEST SERPL-MCNC: 142 MG/DL (ref 0–200)
CO2 SERPL-SCNC: 32.7 MMOL/L (ref 22–29)
CREAT SERPL-MCNC: 0.76 MG/DL (ref 0.57–1)
CREAT UR-MCNC: 137.2 MG/DL
GFR SERPL CREATININE-BSD FRML MDRD: 74 ML/MIN/1.73
GLOBULIN UR ELPH-MCNC: 2.6 GM/DL
GLUCOSE SERPL-MCNC: 115 MG/DL (ref 65–99)
HDLC SERPL-MCNC: 38 MG/DL (ref 40–60)
LDLC SERPL CALC-MCNC: 84 MG/DL (ref 0–100)
LDLC/HDLC SERPL: 2.17 {RATIO}
MICROALBUMIN/CREAT UR: 521.1 MG/G
POTASSIUM SERPL-SCNC: 4.1 MMOL/L (ref 3.5–5.2)
PROT SERPL-MCNC: 6.7 G/DL (ref 6–8.5)
SODIUM SERPL-SCNC: 137 MMOL/L (ref 136–145)
TRIGL SERPL-MCNC: 107 MG/DL (ref 0–150)
VLDLC SERPL-MCNC: 20 MG/DL (ref 5–40)

## 2021-08-20 ENCOUNTER — APPOINTMENT (OUTPATIENT)
Dept: CT IMAGING | Facility: HOSPITAL | Age: 76
End: 2021-08-20

## 2021-08-20 ENCOUNTER — HOSPITAL ENCOUNTER (EMERGENCY)
Facility: HOSPITAL | Age: 76
Discharge: HOME | End: 2021-08-21
Attending: EMERGENCY MEDICINE | Admitting: EMERGENCY MEDICINE

## 2021-08-20 ENCOUNTER — APPOINTMENT (OUTPATIENT)
Dept: GENERAL RADIOLOGY | Facility: HOSPITAL | Age: 76
End: 2021-08-20

## 2021-08-20 DIAGNOSIS — J44.1 COPD EXACERBATION (HCC): ICD-10-CM

## 2021-08-20 DIAGNOSIS — R55 SYNCOPE, UNSPECIFIED SYNCOPE TYPE: Primary | ICD-10-CM

## 2021-08-20 DIAGNOSIS — H66.91 RIGHT OTITIS MEDIA, UNSPECIFIED OTITIS MEDIA TYPE: ICD-10-CM

## 2021-08-20 LAB
A-A DO2: ABNORMAL
ALBUMIN SERPL-MCNC: 4.3 G/DL (ref 3.5–5.2)
ALBUMIN/GLOB SERPL: 1.2 G/DL
ALP SERPL-CCNC: 85 U/L (ref 39–117)
ALT SERPL W P-5'-P-CCNC: 34 U/L (ref 1–33)
ANION GAP SERPL CALCULATED.3IONS-SCNC: 13.5 MMOL/L (ref 5–15)
ARTERIAL PATENCY WRIST A: ABNORMAL
AST SERPL-CCNC: 114 U/L (ref 1–32)
ATMOSPHERIC PRESS: 733 MMHG
B PARAPERT DNA SPEC QL NAA+PROBE: NOT DETECTED
B PERT DNA SPEC QL NAA+PROBE: NOT DETECTED
BASE EXCESS BLDA CALC-SCNC: 2.9 MMOL/L (ref 0–2)
BASOPHILS # BLD AUTO: 0.16 10*3/MM3 (ref 0–0.2)
BASOPHILS NFR BLD AUTO: 1.6 % (ref 0–1.5)
BDY SITE: ABNORMAL
BILIRUB SERPL-MCNC: 0.3 MG/DL (ref 0–1.2)
BUN SERPL-MCNC: 11 MG/DL (ref 8–23)
BUN/CREAT SERPL: 10.9 (ref 7–25)
C PNEUM DNA NPH QL NAA+NON-PROBE: NOT DETECTED
CALCIUM SPEC-SCNC: 9.1 MG/DL (ref 8.6–10.5)
CHLORIDE SERPL-SCNC: 95 MMOL/L (ref 98–107)
CO2 SERPL-SCNC: 26.5 MMOL/L (ref 22–29)
COHGB MFR BLD: 5.5 % (ref 0–2)
CREAT SERPL-MCNC: 1.01 MG/DL (ref 0.57–1)
D DIMER PPP FEU-MCNC: 0.62 MCGFEU/ML (ref 0–0.57)
D-LACTATE SERPL-SCNC: 1.3 MMOL/L (ref 0.5–2)
D-LACTATE SERPL-SCNC: 3.2 MMOL/L (ref 0.5–2)
DEPRECATED RDW RBC AUTO: 39.5 FL (ref 37–54)
EOSINOPHIL # BLD AUTO: 0.33 10*3/MM3 (ref 0–0.4)
EOSINOPHIL NFR BLD AUTO: 3.3 % (ref 0.3–6.2)
ERYTHROCYTE [DISTWIDTH] IN BLOOD BY AUTOMATED COUNT: 12.3 % (ref 12.3–15.4)
FLUAV SUBTYP SPEC NAA+PROBE: NOT DETECTED
FLUBV RNA ISLT QL NAA+PROBE: NOT DETECTED
GAS FLOW AIRWAY: 3.5 LPM
GFR SERPL CREATININE-BSD FRML MDRD: 53 ML/MIN/1.73
GLOBULIN UR ELPH-MCNC: 3.7 GM/DL
GLUCOSE SERPL-MCNC: 242 MG/DL (ref 65–99)
HADV DNA SPEC NAA+PROBE: NOT DETECTED
HCO3 BLDA-SCNC: 30.1 MMOL/L (ref 22–28)
HCOV 229E RNA SPEC QL NAA+PROBE: NOT DETECTED
HCOV HKU1 RNA SPEC QL NAA+PROBE: NOT DETECTED
HCOV NL63 RNA SPEC QL NAA+PROBE: NOT DETECTED
HCOV OC43 RNA SPEC QL NAA+PROBE: NOT DETECTED
HCT VFR BLD AUTO: 46.8 % (ref 34–46.6)
HCT VFR BLD CALC: 44.9 %
HGB BLD-MCNC: 16 G/DL (ref 12–15.9)
HMPV RNA NPH QL NAA+NON-PROBE: NOT DETECTED
HOLD SPECIMEN: NORMAL
HOLD SPECIMEN: NORMAL
HPIV1 RNA SPEC QL NAA+PROBE: NOT DETECTED
HPIV2 RNA SPEC QL NAA+PROBE: NOT DETECTED
HPIV3 RNA NPH QL NAA+PROBE: NOT DETECTED
HPIV4 P GENE NPH QL NAA+PROBE: NOT DETECTED
IMM GRANULOCYTES # BLD AUTO: 0.03 10*3/MM3 (ref 0–0.05)
IMM GRANULOCYTES NFR BLD AUTO: 0.3 % (ref 0–0.5)
LYMPHOCYTES # BLD AUTO: 2.95 10*3/MM3 (ref 0.7–3.1)
LYMPHOCYTES NFR BLD AUTO: 29.1 % (ref 19.6–45.3)
M PNEUMO IGG SER IA-ACNC: NOT DETECTED
MCH RBC QN AUTO: 30.2 PG (ref 26.6–33)
MCHC RBC AUTO-ENTMCNC: 34.2 G/DL (ref 31.5–35.7)
MCV RBC AUTO: 88.5 FL (ref 79–97)
METHGB BLD QL: 0.4 % (ref 0–1.5)
MODALITY: ABNORMAL
MONOCYTES # BLD AUTO: 0.79 10*3/MM3 (ref 0.1–0.9)
MONOCYTES NFR BLD AUTO: 7.8 % (ref 5–12)
NEUTROPHILS NFR BLD AUTO: 5.87 10*3/MM3 (ref 1.7–7)
NEUTROPHILS NFR BLD AUTO: 57.9 % (ref 42.7–76)
NOTE: ABNORMAL
NOTIFIED BY: ABNORMAL
NOTIFIED WHO: ABNORMAL
NRBC BLD AUTO-RTO: 0 /100 WBC (ref 0–0.2)
NT-PROBNP SERPL-MCNC: 256.4 PG/ML (ref 0–1800)
OXYHGB MFR BLDV: 91.4 % (ref 94–99)
PCO2 BLDA: 55.7 MM HG (ref 35–45)
PCO2 TEMP ADJ BLD: ABNORMAL MM[HG]
PH BLDA: 7.34 PH UNITS (ref 7.35–7.45)
PH, TEMP CORRECTED: ABNORMAL
PLATELET # BLD AUTO: 320 10*3/MM3 (ref 140–450)
PMV BLD AUTO: 9.1 FL (ref 6–12)
PO2 BLDA: 85.3 MM HG (ref 75–100)
PO2 TEMP ADJ BLD: ABNORMAL MM[HG]
POTASSIUM SERPL-SCNC: 4 MMOL/L (ref 3.5–5.2)
PROT SERPL-MCNC: 8 G/DL (ref 6–8.5)
RBC # BLD AUTO: 5.29 10*6/MM3 (ref 3.77–5.28)
RHINOVIRUS RNA SPEC NAA+PROBE: NOT DETECTED
RSV RNA NPH QL NAA+NON-PROBE: NOT DETECTED
SAO2 % BLDCOA: 97.1 % (ref 94–100)
SARS-COV-2 RNA NPH QL NAA+NON-PROBE: NOT DETECTED
SARS-COV-2 RNA PNL SPEC NAA+PROBE: NOT DETECTED
SODIUM SERPL-SCNC: 135 MMOL/L (ref 136–145)
TROPONIN T SERPL-MCNC: <0.01 NG/ML (ref 0–0.03)
VENTILATOR MODE: ABNORMAL
WBC # BLD AUTO: 10.13 10*3/MM3 (ref 3.4–10.8)
WHOLE BLOOD HOLD SPECIMEN: NORMAL

## 2021-08-20 PROCEDURE — 80053 COMPREHEN METABOLIC PANEL: CPT | Performed by: EMERGENCY MEDICINE

## 2021-08-20 PROCEDURE — 85025 COMPLETE CBC W/AUTO DIFF WBC: CPT | Performed by: EMERGENCY MEDICINE

## 2021-08-20 PROCEDURE — 83880 ASSAY OF NATRIURETIC PEPTIDE: CPT | Performed by: EMERGENCY MEDICINE

## 2021-08-20 PROCEDURE — 87635 SARS-COV-2 COVID-19 AMP PRB: CPT | Performed by: EMERGENCY MEDICINE

## 2021-08-20 PROCEDURE — 36600 WITHDRAWAL OF ARTERIAL BLOOD: CPT

## 2021-08-20 PROCEDURE — 71275 CT ANGIOGRAPHY CHEST: CPT

## 2021-08-20 PROCEDURE — 93005 ELECTROCARDIOGRAM TRACING: CPT | Performed by: EMERGENCY MEDICINE

## 2021-08-20 PROCEDURE — 83605 ASSAY OF LACTIC ACID: CPT | Performed by: EMERGENCY MEDICINE

## 2021-08-20 PROCEDURE — 99284 EMERGENCY DEPT VISIT MOD MDM: CPT

## 2021-08-20 PROCEDURE — 82375 ASSAY CARBOXYHB QUANT: CPT

## 2021-08-20 PROCEDURE — 25010000002 IOPAMIDOL 61 % SOLUTION: Performed by: EMERGENCY MEDICINE

## 2021-08-20 PROCEDURE — 25010000002 ONDANSETRON PER 1 MG: Performed by: EMERGENCY MEDICINE

## 2021-08-20 PROCEDURE — 84484 ASSAY OF TROPONIN QUANT: CPT | Performed by: EMERGENCY MEDICINE

## 2021-08-20 PROCEDURE — 83050 HGB METHEMOGLOBIN QUAN: CPT

## 2021-08-20 PROCEDURE — 0202U NFCT DS 22 TRGT SARS-COV-2: CPT | Performed by: EMERGENCY MEDICINE

## 2021-08-20 PROCEDURE — 96374 THER/PROPH/DIAG INJ IV PUSH: CPT

## 2021-08-20 PROCEDURE — 71045 X-RAY EXAM CHEST 1 VIEW: CPT

## 2021-08-20 PROCEDURE — 85379 FIBRIN DEGRADATION QUANT: CPT | Performed by: EMERGENCY MEDICINE

## 2021-08-20 PROCEDURE — 82805 BLOOD GASES W/O2 SATURATION: CPT

## 2021-08-20 RX ORDER — PREDNISONE 20 MG/1
20 TABLET ORAL 2 TIMES DAILY
Qty: 10 TABLET | Refills: 0 | Status: SHIPPED | OUTPATIENT
Start: 2021-08-20 | End: 2021-08-25

## 2021-08-20 RX ORDER — SODIUM CHLORIDE 0.9 % (FLUSH) 0.9 %
10 SYRINGE (ML) INJECTION AS NEEDED
Status: DISCONTINUED | OUTPATIENT
Start: 2021-08-20 | End: 2021-08-21 | Stop reason: HOSPADM

## 2021-08-20 RX ORDER — ONDANSETRON 2 MG/ML
4 INJECTION INTRAMUSCULAR; INTRAVENOUS ONCE
Status: COMPLETED | OUTPATIENT
Start: 2021-08-20 | End: 2021-08-20

## 2021-08-20 RX ADMIN — ONDANSETRON 4 MG: 2 INJECTION INTRAMUSCULAR; INTRAVENOUS at 21:39

## 2021-08-20 RX ADMIN — IOPAMIDOL 100 ML: 612 INJECTION, SOLUTION INTRAVENOUS at 21:19

## 2021-08-20 NOTE — ED NOTES
Lab called at this time requesting they add on a blue top for D-dimer.      Ilda Sotomayor RN  08/20/21 1952

## 2021-08-20 NOTE — ED NOTES
Attempted to call remberto Balderas's son. No answer at this time.      Ilda Sotomayor, RN  08/20/21 1949

## 2021-08-21 VITALS
SYSTOLIC BLOOD PRESSURE: 122 MMHG | RESPIRATION RATE: 22 BRPM | BODY MASS INDEX: 30.49 KG/M2 | OXYGEN SATURATION: 96 % | HEIGHT: 65 IN | WEIGHT: 183 LBS | DIASTOLIC BLOOD PRESSURE: 60 MMHG | HEART RATE: 68 BPM | TEMPERATURE: 99 F

## 2021-08-21 RX ORDER — CEPHALEXIN 500 MG/1
500 CAPSULE ORAL 2 TIMES DAILY
Qty: 14 CAPSULE | Refills: 0 | Status: SHIPPED | OUTPATIENT
Start: 2021-08-21 | End: 2021-08-28

## 2021-08-21 NOTE — ED NOTES
Spoke with Andrey, pt's son, for an update on pt's status.      Ilda Sotomayor, RN  08/21/21 0001

## 2021-08-21 NOTE — ED PROVIDER NOTES
"Subjective   76-year-old female presents to the ED with a chief complaint of syncope.  Per EMS the patient was going to the urgent care to be seen and evaluated for right ear pain.  While walking into the urgent care the patient had a syncopal event.  She passed out and her son helped her sit down to the chair.  On their arrival the patient was breathing shallowly and slowly and apparently had a pulse ox of 32% on room air.  Patient was bagged and placed on nonrebreather and her mental status improved.  On arrival to the ED the patient is on nonrebreather awake alert and oriented x3.  She states that she does not remember the event.  She had no preceding chest pain or shortness of breath.  She denies cough or wheeze.  No nausea vomiting diarrhea abdominal pain.  No focal neurological deficits.  She does complain of right ear pain.  States that the entire reason she was going to the urgent care to be seen as she has had dull throbbing aching right ear pain for 3 days.  No other complaints at this time.          Review of Systems   Constitutional: Negative for fatigue and fever.   Neurological: Positive for syncope. Negative for seizures.   All other systems reviewed and are negative.      Past Medical History:   Diagnosis Date   • Anesthesia 02/14/2020    Patient reported she is slow to wake after anesthesia   • Angina at rest (CMS/HCC)    • Anxiety    • Arthritis    • Asthma    • Body piercing     both ears   • Cataract, bilateral     s/p surgery   • Colon polyp     adenomatous polyps   • Depression    • Diabetes mellitus (CMS/HCC)    • Elevated cholesterol    • Frequent UTI    • Headache    • Heart murmur    • History of chest pain 02/14/2020    Patient reported \"they think it's nerves\" and reported last episode was 6-7 months ago   • Hyperlipidemia    • Hypertension    • Migraines    • Peptic ulceration    • RA (rheumatoid arthritis) (CMS/HCC)    • Skin cancer     face   • Type 2 diabetes mellitus (CMS/HCC)    • " Urinary tract infection    • Wears dentures     full set   • Wears glasses        Allergies   Allergen Reactions   • Penicillins Rash       Past Surgical History:   Procedure Laterality Date   • ABDOMINAL HYSTERECTOMY  2007   • APPENDECTOMY     • COLONOSCOPY  2015?   • COLONOSCOPY N/A 2/17/2020    Procedure: COLONOSCOPY WITH COLD FORCEP POLYPECTOMY;  Surgeon: Belkis Peraza MD;  Location: [facility] ENDOSCOPY;  Service: Gastroenterology;  Laterality: N/A;   • COLONOSCOPY W/ POLYPECTOMY     • LAPAROSCOPIC CHOLECYSTECTOMY  2010   • MOUTH SURGERY      full extraction of teeth   • SALPINGO OOPHORECTOMY Bilateral 2009   • SKIN BIOPSY     • SPINAL FUSION  2002       Family History   Problem Relation Age of Onset   • Uterine cancer Mother         widely metastatic   • Breast cancer Mother    • Arthritis Mother    • Heart attack Father    • Arthritis Father    • Hypertension Father    • Diabetes Other        Social History     Socioeconomic History   • Marital status:      Spouse name: Not on file   • Number of children: Not on file   • Years of education: Not on file   • Highest education level: Not on file   Tobacco Use   • Smoking status: Current Every Day Smoker     Packs/day: 0.50     Years: 56.00     Pack years: 28.00     Types: Cigarettes   • Smokeless tobacco: Never Used   • Tobacco comment: slowing down   Vaping Use   • Vaping Use: Never used   Substance and Sexual Activity   • Alcohol use: Not Currently     Comment: occa   • Drug use: No   • Sexual activity: Defer           Objective   Physical Exam  Vitals and nursing note reviewed.   Constitutional:       General: She is not in acute distress.     Appearance: She is well-developed. She is not diaphoretic.   HENT:      Head: Normocephalic and atraumatic.      Comments: Right tympanic membrane injected and erythematous.     Nose: Nose normal.   Eyes:      Conjunctiva/sclera: Conjunctivae normal.   Cardiovascular:      Rate and Rhythm: Normal rate and regular  rhythm.      Pulses: Normal pulses.   Pulmonary:      Effort: Pulmonary effort is normal. No respiratory distress.      Breath sounds: Normal breath sounds.   Abdominal:      General: There is no distension.      Palpations: Abdomen is soft.      Tenderness: There is no abdominal tenderness. There is no guarding.   Musculoskeletal:         General: No deformity.   Neurological:      Mental Status: She is alert and oriented to person, place, and time.      Cranial Nerves: No cranial nerve deficit.         Procedures           ED Course  ED Course as of Aug 21 0014   Fri Aug 20, 2021   2003 EKG interpreted by me.  Sinus rhythm.  Rate of 88.  Left axis deviation.  Left bundle branch block.  No obvious ST or T wave changes.  Abnormal EKG.    [CG]      ED Course User Index  [CG] Tej Umanzor, DO                PULSE OXIMETRY INTERPRETATION  Patient had a pulse ox of 96% on room air. This is a normal pulse oximetry reading.                           MDM  76-year-old female presents to the ED status post syncopal event with questionable unresponsive episode and hypoxia.  Work-up is reassuring here in the ED.  EKG was nonischemic.  Troponin was negative.  Further labs without significant acute abnormality.  CT PE was negative for acute process.  Patient continues to be awake alert and oriented x4 throughout her entire time here in the ED.  Shared decision making with the patient she does not wish to be admitted at this time.  Review of records show that she had a similar episode like this in May of this year and had extensive work-up at [facility] where they did not find a specific cause.  No known arrhythmia was found.  Based on review of records and evaluation today I suspect that is multifactorial.  The patient may have had some hypercapnia related to COPD and likely polypharmacy given the multiple sedating medications she is on.  She was comfortably here and has had a negative work-up.  She has a  follow-up with her cardiologist next week and feels she is appropriate for discharge follow-up outpatient as needed.  She is agreeable with this plan.      Final diagnoses:   Syncope, unspecified syncope type   COPD exacerbation (CMS/[facility])   Right otitis media, unspecified otitis media type       ED Disposition  ED Disposition     ED Disposition Condition Comment    Discharge Stable           Murtaza Elder MD  [address] [facility] [address]  [place] [id number] [phone]               Medication List      New Prescriptions    cephalexin 500 MG capsule  Commonly known as: KEFLEX  Take 1 capsule by mouth 2 (Two) Times a Day for 7 days.     predniSONE 20 MG tablet  Commonly known as: DELTASONE  Take 1 tablet by mouth 2 (Two) Times a Day for 5 days.        Changed    fluticasone-salmeterol 250-50 MCG/DOSE DISKUS  Commonly known as: Advair Diskus  Inhale 1 puff 2 (Two) Times a Day.  What changed: when to take this     nitroglycerin 0.4 MG SL tablet  Commonly known as: NITROSTAT  1 under the tongue as needed for angina, may repeat q5mins for up three doses  What changed:   · how much to take  · how to take this  · when to take this  · reasons to take this           Where to Get Your Medications      These medications were sent to [facility]/pharmacy #[id number] - [place], [place] - [address] - [phone]  - [phone]   [address] [address]    Phone: [phone]   · cephalexin 500 MG capsule  · predniSONE 20 MG tablet          Tej Umanzor, DO  08/21/21 0014

## 2021-09-02 PROBLEM — R55 SYNCOPE AND COLLAPSE: Status: ACTIVE | Noted: 2021-09-02

## 2021-09-02 NOTE — PROGRESS NOTES
"             [facility] Cardiology Office Follow Up Note    Katharine Fitzpatrick  [id number]  09/07/2021    Primary Care Provider: Murtaza Elder MD   Referring Provider: No ref. provider found    Chief Complaint: Syncope    History of Present Illness:   Mrs. Katharine Fitzpatrick is a 76 y.o. female who presents to the Cardiology Clinic for follow up of syncope.  Patient has a past medical history of hypertension, dyslipidemia, type 2 diabetes, bipolar disorder, and centrilobular emphysema with ongoing tobacco abuse.  He was last seen in the cardiology office approximately 6 months ago after undergoing an outpatient echocardiogram and outpatient cardiac monitor after her initial evaluation for chest discomfort, shortness of breath, and palpitations.  The patient was scheduled for a vasodilator nuclear stress test which she canceled due to a \"irrational fear\" of stress testing.  The patient had previously undergone a vasodilator nuclear stress test in 2018 which showed no evidence of ischemia or infarction.  The patient was adamant about her wishes to forego this type of testing.  At that time, she also reported noncompliance with her blood pressure medication, indicating that she thought this was \"doing more harm than good\".  Most recently, she was seen approximately 2 weeks ago in the ED with a chief complaint of syncope.  Per EMS, the patient was going to urgent care to be seen and evaluated for right ear pain.  While walking into urgent care, she experienced a syncopal event.  She passed out and her son helped her sit down to the chair.  On arrival their arrival, the patient was breathing shallowly and slowly and had a reported pulse ox reading of 32% on room air.  She was subsequently bagged and placed on a nonrebreather with improvement of her mental status.  She stated to the ED staff that she did not remember this event.  She denied any preceding chest pain or shortness of breath.  She continued " "to complain of right ear pain which had been affecting her for 3 days.  Her EKG at that time showed sinus rhythm with left axis deviation and left bundle branch block, which is essentially unchanged from her  EKG.  A records review also demonstrates a negative troponin and a negative CT for pulmonary embolism.  Review of records show that the patient had experienced a similar episode in  at [facility] where no specific cause was identified.  She presents today for follow-up of syncope.  The patient reports non-radiating chest discomfort that can occur with or without exertion.  These episodes happened twice a week and can last up 10 minutes.  The discomfort is relieved with rest.  She does not rate it on the pain scale, but notes it is \"not severe\".  She specifically denies dyspnea, diaphoresis, nausea, and vomiting with these episodes.  She denies any recurrence of syncope.  She specifically denies palpitations and dizziness.  She denies orthopnea, PND, and lower extremity edema.  She continues to smoke.  She is wearing portable oxygen today \"in case [she] needs it\" she reports ongoing anxiety after her syncopal episode and told me that she had to be resuscitated with CPR.  She states that she is afraid to exert herself at all.  She wishes to reestablish pulmonary care with a local provider.  She offers no other complaints or concerns at this time.    Past Cardiac Testin. Last Coronary Angio: None  2. Prior Stress Testing: Ordered in , but patient scheduled due to an \"irrational fear\" of this test  3. Last Echo: 2021          1. Left ventricular wall thickness is consistent with borderline concentric hypertrophy.          2. Left ventricular diastolic function is consistent with (grade I) impaired relaxation.          3. Estimated left ventricular EF = 66% Left ventricular ejection fraction appears to be 66 - 70%. Left ventricular systolic function is normal.        "   4. Estimated right ventricular systolic pressure from tricuspid regurgitation is mildly elevated (35-45 mmHg).          5. Mild pulmonary hypertension is present.  4. Prior Holter Monitor: 2/9/2021              1. A normal (7-day) monitor study    Review of Systems:   Review of Systems   Constitutional: Positive for fatigue. Negative for activity change, chills, diaphoresis, fever and unexpected weight gain.   Eyes: Negative for blurred vision and visual disturbance.   Respiratory: Positive for chest tightness. Negative for apnea, cough, shortness of breath and wheezing.    Cardiovascular: Positive for chest pain. Negative for palpitations and leg swelling.   Gastrointestinal: Negative for abdominal distention, blood in stool, GERD and indigestion.   Endocrine: Negative for cold intolerance and heat intolerance.   Genitourinary: Negative for hematuria.   Musculoskeletal: Negative for gait problem, joint swelling and myalgias.   Skin: Negative for color change, pallor and bruise.   Neurological: Positive for syncope. Negative for dizziness, seizures, weakness, light-headedness, numbness, headache and confusion.   Hematological: Does not bruise/bleed easily.   Psychiatric/Behavioral: Negative for behavioral problems, sleep disturbance, suicidal ideas and depressed mood. The patient is nervous/anxious.      I have reviewed and confirmed the accuracy of the ROS as documented by the MA/LPN/RN EUNICE Irby    I have reviewed and/or updated the patient's past medical, past surgical, family, social history, problem list and allergies as appropriate.     Medications:     Current Outpatient Medications:   •  amLODIPine (NORVASC) 5 MG tablet, Take 5 mg by mouth Daily., Disp: , Rfl:   •  ARIPiprazole (ABILIFY) 5 MG tablet, aripiprazole 5 mg tablet, Disp: , Rfl:   •  atorvastatin (LIPITOR) 20 MG tablet, atorvastatin 20 mg tablet  TAKE 1 TABLET BY MOUTH EVERY DAY, Disp: , Rfl:   •  clonazePAM (KlonoPIN) 1 MG  tablet, Take 1 mg by mouth 3 (Three) Times a Day As Needed., Disp: , Rfl:   •  Diclofenac Sodium (VOLTAREN) 1 % gel gel, , Disp: , Rfl:   •  fenofibrate 160 MG tablet, Take 160 mg by mouth Daily., Disp: , Rfl:   •  HYDROcodone-acetaminophen (NORCO) 7.5-325 MG per tablet, Take 1 tablet by mouth Daily., Disp: , Rfl:   •  insulin aspart (novoLOG FLEXPEN) 100 UNIT/ML solution pen-injector sc pen, For use at mealtimes and per correctional scale, up to 30 units daily, Disp: 9 mL, Rfl: 5  •  Insulin Glargine (BASAGLAR KWIKPEN) 100 UNIT/ML injection pen, Inject 15 Units under the skin into the appropriate area as directed Daily., Disp: 6 mL, Rfl: 5  •  ipratropium-albuterol (DUO-NEB) 0.5-2.5 mg/3 ml nebulizer, USE 1 AMPULES IN NEBULIZER FOUR TIMES A DAY AS DIRECTED, Disp: , Rfl:   •  isosorbide dinitrate (ISORDIL) 30 MG tablet, Take 30 mg by mouth Daily., Disp: , Rfl:   •  losartan (COZAAR) 50 MG tablet, Take 1 tablet by mouth Daily., Disp: 90 tablet, Rfl: 3  •  methocarbamol (ROBAXIN) 500 MG tablet, Take 1,000 mg by mouth 2 (two) times a day. Patient takes 2 tablets 2 times a day. , Disp: , Rfl:   •  methotrexate 2.5 MG tablet, , Disp: , Rfl:   •  metoprolol tartrate (LOPRESSOR) 50 MG tablet, Take 1 tablet by mouth 2 (Two) Times a Day., Disp: 180 tablet, Rfl: 3  •  nicotine polacrilex (COMMIT) 2 MG lozenge, nicotine (polacrilex) 2 mg buccal lozenge  DISSOLVE 1 LOZENGE (2 MG TOTAL) IN THE MOUTH IF NEEDED FOR SMOKING CESSATION., Disp: , Rfl:   •  nitroglycerin (NITROSTAT) 0.4 MG SL tablet, 1 under the tongue as needed for angina, may repeat q5mins for up three doses (Patient taking differently: Place 0.4 mg under the tongue Every 5 (Five) Minutes As Needed (pt states has never taken this). 1 under the tongue as needed for angina, may repeat q5mins for up three doses), Disp: 100 tablet, Rfl: 11  •  SUMAtriptan (IMITREX) 50 MG tablet, Take 50 mg by mouth As Needed., Disp: , Rfl:   •  triamcinolone (KENALOG) 0.1 % cream, Apply  "1 application topically to the appropriate area as directed Daily., Disp: , Rfl:   •  venlafaxine XR (EFFEXOR-XR) 150 MG 24 hr capsule, TAKE 1 CAPSULE TWICE DAILY (Patient taking differently: Take 150 mg by mouth 2 (Two) Times a Day.), Disp: 180 capsule, Rfl: 3  •  albuterol sulfate  (90 Base) MCG/ACT inhaler, Inhale 2 puffs Every 4 (Four) Hours As Needed for Wheezing., Disp: 6.7 g, Rfl: 5  •  LORazepam (ATIVAN) 1 MG tablet, Take 1 mg by mouth Every 8 (Eight) Hours As Needed for Anxiety., Disp: , Rfl:   •  nitrofurantoin (MACRODANTIN) 100 MG capsule, Take 100 mg by mouth Every Night., Disp: , Rfl:   •  rosuvastatin (CRESTOR) 10 MG tablet, Take 10 mg by mouth Daily., Disp: , Rfl:   •  Trelegy Ellipta 100-62.5-25 MCG/INH inhaler, Inhale 1 puff Daily., Disp: 3 each, Rfl: 3    Physical Exam:  Vital Signs:   Vitals:    09/07/21 1517   BP: 140/80   BP Location: Right arm   Patient Position: Sitting   Cuff Size: Adult   Pulse: 58   SpO2: 95%   Weight: 81.2 kg (179 lb)   Height: 165.1 cm (65\")     Body mass index is 29.79 kg/m².    Physical Exam  Vitals and nursing note reviewed.   Constitutional:       General: She is not in acute distress.     Appearance: Normal appearance. She is well-developed.      Comments: Elderly appearing, chronically ill-appearing, tardive dyskinesia noted   HENT:      Head: Normocephalic and atraumatic.      Mouth/Throat:      Mouth: Mucous membranes are moist.   Eyes:      General: No scleral icterus.     Extraocular Movements: Extraocular movements intact.   Neck:      Trachea: Trachea normal.   Cardiovascular:      Rate and Rhythm: Regular rhythm. Bradycardia present.      Pulses: Normal pulses.      Heart sounds: Normal heart sounds. No murmur heard.   No friction rub. No gallop.    Pulmonary:      Effort: Pulmonary effort is normal.      Comments: Decreased breath sounds throughout  Abdominal:      Palpations: Abdomen is soft.      Tenderness: There is no abdominal tenderness. " "  Musculoskeletal:         General: Normal range of motion.      Cervical back: Neck supple.      Right lower leg: No edema.      Left lower leg: No edema.   Skin:     General: Skin is warm and dry.      Findings: No bruising, lesion or rash.   Neurological:      Mental Status: She is alert and oriented to person, place, and time.      Motor: No weakness.      Gait: Gait normal.   Psychiatric:         Mood and Affect: Mood normal.         Behavior: Behavior normal. Behavior is cooperative.         Thought Content: Thought content does not include suicidal ideation.         Results Review:   I reviewed the patient's new clinical results.    Assessment / Plan:     1. Syncope and collapse (Primary)  --She was hypoxic in the setting of COPD and polypharmacy (clonazepam, hydrocodone-acetaminophen, and robaxin)  --No prodrome  --Stable EKG, negative troponin, and normal CT scan per PE protocol (per ER records)  --Normal 7-day outpatient cardiac monitor in 3/21  --8/20 CT head w/o contrast showed \"generalized cerebral volume loss.  Patchy hypodensities in the periventricular white matter consistent with chronic small vessel ischemic change.\"  --12/18 MRI Brain w/o contrast showed \" findings consistent with chronic small vessel ischemic change with no acute intracranial abnormality.\"  --Referral to neurology for syncope and abnormal MRI of brain and CT of head  --30-day MCOT to rule out underlying arrhythmia or ectopy  --Follow-up with Dr. García in 6 weeks if needed    2. Chest pain  --Stable EKG, negative troponin, and normal CT scan per PE protocol (per ER records)    --Multiple risk factors for coronary artery disease  --Proceed with vasodilator nuclear stress test  --Follow-up with Dr. García in 6 weeks or sooner if needed    3. Essential hypertension  --Less than ideal blood pressure  --Continue current antihypertensives at this time     4. Dyslipidemia  --Recent LDL 84  --Continue statin     5. Type 2 diabetes " mellitus without complication, with long-term current use of insulin (CMS/HCC)  --HgA1c 9.3% in 5/21  --This is closely followed by endocrinology     6. Bipolar disorder  --[facility] medication/treatment compliance     7. Centrilobular emphysema (CMS/HCC)  --Complicated by ongoing tobacco abuse  --Referral to Dr. Mcarthur to reestablish care with local pulmonologist     8. Tobacco abuse  --Strongly advised complete cessation    Preventative Cardiology:   Tobacco Cessation: Cessation Counseling Provided    Advance Care Planning: ACP discussion was declined by the patient. Patient does not have an advance directive, declines further assistance.     Follow Up:   Return in about 6 weeks (around 10/19/2021) for Follow-up with Dr. García.      Thank you for allowing me to participate in the care of your patient. Please to not hesitate to contact me with additional questions or concerns.     EUNICE Bosch

## 2021-09-07 ENCOUNTER — OFFICE VISIT (OUTPATIENT)
Dept: CARDIOLOGY | Facility: CLINIC | Age: 76
End: 2021-09-07

## 2021-09-07 VITALS
BODY MASS INDEX: 29.82 KG/M2 | HEART RATE: 58 BPM | HEIGHT: 65 IN | OXYGEN SATURATION: 95 % | WEIGHT: 179 LBS | SYSTOLIC BLOOD PRESSURE: 140 MMHG | DIASTOLIC BLOOD PRESSURE: 80 MMHG

## 2021-09-07 DIAGNOSIS — I10 ESSENTIAL HYPERTENSION: ICD-10-CM

## 2021-09-07 DIAGNOSIS — E11.9 TYPE 2 DIABETES MELLITUS WITHOUT COMPLICATION, WITH LONG-TERM CURRENT USE OF INSULIN (HCC): ICD-10-CM

## 2021-09-07 DIAGNOSIS — Z79.4 TYPE 2 DIABETES MELLITUS WITHOUT COMPLICATION, WITH LONG-TERM CURRENT USE OF INSULIN (HCC): ICD-10-CM

## 2021-09-07 DIAGNOSIS — R07.2 PRECORDIAL PAIN: ICD-10-CM

## 2021-09-07 DIAGNOSIS — R55 SYNCOPE AND COLLAPSE: Primary | ICD-10-CM

## 2021-09-07 DIAGNOSIS — J43.2 CENTRILOBULAR EMPHYSEMA (HCC): ICD-10-CM

## 2021-09-07 DIAGNOSIS — E78.5 DYSLIPIDEMIA: ICD-10-CM

## 2021-09-07 DIAGNOSIS — Z72.0 TOBACCO ABUSE: ICD-10-CM

## 2021-09-07 DIAGNOSIS — F31.78 BIPOLAR DISORDER, IN FULL REMISSION, MOST RECENT EPISODE MIXED (HCC): ICD-10-CM

## 2021-09-07 PROCEDURE — 99214 OFFICE O/P EST MOD 30 MIN: CPT | Performed by: NURSE PRACTITIONER

## 2021-09-07 RX ORDER — ARIPIPRAZOLE 5 MG/1
TABLET ORAL
COMMUNITY

## 2021-09-07 RX ORDER — POLYETHYLENE GLYCOL 3350 17 G
POWDER IN PACKET (EA) ORAL
COMMUNITY
Start: 2021-06-22 | End: 2022-04-15

## 2021-09-10 ENCOUNTER — OFFICE VISIT (OUTPATIENT)
Dept: PULMONOLOGY | Facility: CLINIC | Age: 76
End: 2021-09-10

## 2021-09-10 VITALS
HEART RATE: 65 BPM | OXYGEN SATURATION: 96 % | HEIGHT: 65 IN | RESPIRATION RATE: 18 BRPM | DIASTOLIC BLOOD PRESSURE: 88 MMHG | WEIGHT: 186 LBS | SYSTOLIC BLOOD PRESSURE: 140 MMHG | BODY MASS INDEX: 30.99 KG/M2

## 2021-09-10 DIAGNOSIS — G47.19 DAYTIME HYPERSOMNOLENCE: ICD-10-CM

## 2021-09-10 DIAGNOSIS — J43.2 CENTRILOBULAR EMPHYSEMA (HCC): Primary | ICD-10-CM

## 2021-09-10 DIAGNOSIS — R06.09 DYSPNEA ON EXERTION: ICD-10-CM

## 2021-09-10 DIAGNOSIS — Z72.0 NICOTINE ABUSE: ICD-10-CM

## 2021-09-10 DIAGNOSIS — Z99.81 DEPENDENCE ON SUPPLEMENTAL OXYGEN: ICD-10-CM

## 2021-09-10 DIAGNOSIS — D75.1 POLYCYTHEMIA, SECONDARY: ICD-10-CM

## 2021-09-10 PROCEDURE — 99204 OFFICE O/P NEW MOD 45 MIN: CPT | Performed by: INTERNAL MEDICINE

## 2021-09-10 RX ORDER — NITROFURANTOIN MACROCRYSTALS 100 MG/1
100 CAPSULE ORAL NIGHTLY
COMMUNITY

## 2021-09-10 RX ORDER — LORAZEPAM 1 MG/1
1 TABLET ORAL EVERY 8 HOURS PRN
COMMUNITY

## 2021-09-10 RX ORDER — ALBUTEROL SULFATE 90 UG/1
2 AEROSOL, METERED RESPIRATORY (INHALATION) EVERY 4 HOURS PRN
Qty: 6.7 G | Refills: 5 | Status: SHIPPED | OUTPATIENT
Start: 2021-09-10 | End: 2021-12-10 | Stop reason: SDUPTHER

## 2021-09-10 RX ORDER — ROSUVASTATIN CALCIUM 10 MG/1
10 TABLET, COATED ORAL DAILY
COMMUNITY

## 2021-09-10 NOTE — PROGRESS NOTES
New Pulmonary Patient Office Visit      Patient Name: Katharine Fitzpatrick    Referring Physician: Viri Whipple A*    Chief Complaint:    Chief Complaint   Patient presents with   • Consult   • Breathing Problem       History of Present Illness: Katharine Fitzpatrick is a 76 y.o. female who is here today to establish care with Pulmonary.       Duration: COPD diagnosed recently clinically  Severity: moderate dyspnea daily  Associated Symptoms:  Chronic cough with yellowish phlegm, no current wheezing  Exercise Tolerance: able to walk on flat ground for 200-300 ft  Timing: daily  Exacerbating Factors: exertion, weather changes  Relieving Factors: rest and inhalers    Requires antibiotics/steroids 1x per year  Never hospitalized for COPD, but had recent ED visit for hypoxia and started on oxygen 24/7 and feeling better    However, she does notice nonrestorative sleep, fatigue and daytime hypersomnolence.    Current Regimen:   Inhalers: Trelegy for last 2 weeks and prn albuterol   Nebs:  duonebs prn  Less than 1 week     Supplemental Oxygen: 3L   DME Company: [facility] [employer]      Subjective      Review of Systems:   Review of Systems   Constitutional: Positive for fatigue. Negative for chills, fever, unexpected weight gain and unexpected weight loss.   HENT: Positive for hearing loss. Negative for postnasal drip, rhinorrhea, sinus pressure, trouble swallowing and voice change.    Eyes: Negative for discharge and itching.   Respiratory: Positive for cough and shortness of breath. Negative for wheezing.    Cardiovascular: Positive for chest pain. Negative for palpitations and leg swelling.        Stress test scheduled   Gastrointestinal: Negative for abdominal distention, constipation, diarrhea and GERD.   Endocrine: Positive for cold intolerance and heat intolerance.        + known DM   Genitourinary: Negative for dysuria and hematuria.   Musculoskeletal: Positive for arthralgias and back pain. Negative for myalgias.  "  Skin: Negative for color change and rash.   Allergic/Immunologic: Negative for environmental allergies and food allergies.   Neurological: Positive for headache. Negative for dizziness, speech difficulty, light-headedness and memory problem.   Hematological: Negative for adenopathy. Does not bruise/bleed easily.   Psychiatric/Behavioral: Positive for depressed mood. Negative for sleep disturbance. The patient is nervous/anxious.        Past Medical History:   Past Medical History:   Diagnosis Date   • Anesthesia 02/14/2020    Patient reported she is slow to wake after anesthesia   • Angina at rest (CMS/HCC)    • Anxiety    • Arthritis    • Asthma    • Body piercing     both ears   • Cataract, bilateral     s/p surgery   • Colon polyp     adenomatous polyps   • Depression    • Diabetes mellitus (CMS/HCC)    • Elevated cholesterol    • Frequent UTI    • Headache    • Heart murmur    • History of chest pain 02/14/2020    Patient reported \"they think it's nerves\" and reported last episode was 6-7 months ago   • Hyperlipidemia    • Hypertension    • Migraines    • Peptic ulceration    • RA (rheumatoid arthritis) (CMS/HCC)    • Skin cancer     face   • Type 2 diabetes mellitus (CMS/HCC)    • Urinary tract infection    • Wears dentures     full set   • Wears glasses        Past Surgical History:   Past Surgical History:   Procedure Laterality Date   • ABDOMINAL HYSTERECTOMY  2007   • APPENDECTOMY     • COLONOSCOPY  2015?   • COLONOSCOPY N/A 2/17/2020    Procedure: COLONOSCOPY WITH COLD FORCEP POLYPECTOMY;  Surgeon: Belkis Peraza MD;  Location: [facility] ENDOSCOPY;  Service: Gastroenterology;  Laterality: N/A;   • COLONOSCOPY W/ POLYPECTOMY     • LAPAROSCOPIC CHOLECYSTECTOMY  2010   • MOUTH SURGERY      full extraction of teeth   • SALPINGO OOPHORECTOMY Bilateral 2009   • SKIN BIOPSY     • SPINAL FUSION  2002       Family History:   Family History   Problem Relation Age of Onset   • Uterine cancer Mother         widely " metastatic   • Breast cancer Mother    • Arthritis Mother    • Heart attack Father    • Arthritis Father    • Hypertension Father    • Diabetes Other        Social History:   Social History     Socioeconomic History   • Marital status:      Spouse name: Not on file   • Number of children: Not on file   • Years of education: Not on file   • Highest education level: Not on file   Tobacco Use   • Smoking status: Current Every Day Smoker     Packs/day: 0.50     Years: 56.00     Pack years: 28.00     Types: Cigarettes   • Smokeless tobacco: Never Used   • Tobacco comment: smokes 1 cig a day    Vaping Use   • Vaping Use: Never used   Substance and Sexual Activity   • Alcohol use: Not Currently     Comment: occa   • Drug use: No   • Sexual activity: Defer       Medications:     Current Outpatient Medications:   •  fenofibrate 160 MG tablet, Take 160 mg by mouth Daily., Disp: , Rfl:   •  HYDROcodone-acetaminophen (NORCO) 7.5-325 MG per tablet, Take 1 tablet by mouth Daily., Disp: , Rfl:   •  insulin aspart (novoLOG FLEXPEN) 100 UNIT/ML solution pen-injector sc pen, For use at mealtimes and per correctional scale, up to 30 units daily, Disp: 9 mL, Rfl: 5  •  Insulin Glargine (BASAGLAR KWIKPEN) 100 UNIT/ML injection pen, Inject 15 Units under the skin into the appropriate area as directed Daily., Disp: 6 mL, Rfl: 5  •  isosorbide dinitrate (ISORDIL) 30 MG tablet, Take 30 mg by mouth Daily., Disp: , Rfl:   •  LORazepam (ATIVAN) 1 MG tablet, Take 1 mg by mouth Every 8 (Eight) Hours As Needed for Anxiety., Disp: , Rfl:   •  losartan (COZAAR) 50 MG tablet, Take 1 tablet by mouth Daily., Disp: 90 tablet, Rfl: 3  •  methocarbamol (ROBAXIN) 500 MG tablet, Take 1,000 mg by mouth 2 (two) times a day. Patient takes 2 tablets 2 times a day. , Disp: , Rfl:   •  metoprolol tartrate (LOPRESSOR) 50 MG tablet, Take 1 tablet by mouth 2 (Two) Times a Day., Disp: 180 tablet, Rfl: 3  •  nitrofurantoin (MACRODANTIN) 100 MG capsule, Take  100 mg by mouth Every Night., Disp: , Rfl:   •  rosuvastatin (CRESTOR) 10 MG tablet, Take 10 mg by mouth Daily., Disp: , Rfl:   •  Trelegy Ellipta 100-62.5-25 MCG/INH inhaler, Inhale 1 puff Daily., Disp: 3 each, Rfl: 3  •  venlafaxine XR (EFFEXOR-XR) 150 MG 24 hr capsule, TAKE 1 CAPSULE TWICE DAILY (Patient taking differently: Take 150 mg by mouth 2 (Two) Times a Day.), Disp: 180 capsule, Rfl: 3  •  albuterol sulfate  (90 Base) MCG/ACT inhaler, Inhale 2 puffs Every 4 (Four) Hours As Needed for Wheezing., Disp: 6.7 g, Rfl: 5  •  amLODIPine (NORVASC) 5 MG tablet, Take 5 mg by mouth Daily., Disp: , Rfl:   •  ARIPiprazole (ABILIFY) 5 MG tablet, aripiprazole 5 mg tablet, Disp: , Rfl:   •  atorvastatin (LIPITOR) 20 MG tablet, atorvastatin 20 mg tablet  TAKE 1 TABLET BY MOUTH EVERY DAY, Disp: , Rfl:   •  clonazePAM (KlonoPIN) 1 MG tablet, Take 1 mg by mouth 3 (Three) Times a Day As Needed., Disp: , Rfl:   •  Diclofenac Sodium (VOLTAREN) 1 % gel gel, , Disp: , Rfl:   •  ipratropium-albuterol (DUO-NEB) 0.5-2.5 mg/3 ml nebulizer, USE 1 AMPULES IN NEBULIZER FOUR TIMES A DAY AS DIRECTED, Disp: , Rfl:   •  methotrexate 2.5 MG tablet, , Disp: , Rfl:   •  nicotine polacrilex (COMMIT) 2 MG lozenge, nicotine (polacrilex) 2 mg buccal lozenge  DISSOLVE 1 LOZENGE (2 MG TOTAL) IN THE MOUTH IF NEEDED FOR SMOKING CESSATION., Disp: , Rfl:   •  nitroglycerin (NITROSTAT) 0.4 MG SL tablet, 1 under the tongue as needed for angina, may repeat q5mins for up three doses (Patient taking differently: Place 0.4 mg under the tongue Every 5 (Five) Minutes As Needed (pt states has never taken this). 1 under the tongue as needed for angina, may repeat q5mins for up three doses), Disp: 100 tablet, Rfl: 11  •  SUMAtriptan (IMITREX) 50 MG tablet, Take 50 mg by mouth As Needed., Disp: , Rfl:   •  triamcinolone (KENALOG) 0.1 % cream, Apply 1 application topically to the appropriate area as directed Daily., Disp: , Rfl:     Allergies:   Allergies  "  Allergen Reactions   • Ceftin [Cefuroxime Axetil] Diarrhea and Nausea Only     MODERATE DIARRHEA   • Quinapril Cough   • Codeine Rash   • Penicillins Rash       Objective     Physical Exam:  Vital Signs:   Vitals:    09/10/21 1007   BP: 140/88   Pulse: 65   Resp: 18   SpO2: 96%  Comment: room air at rest   Weight: 84.4 kg (186 lb)   Height: 165.1 cm (65\")       Physical Exam  Vitals and nursing note reviewed.   Constitutional:       General: She is not in acute distress.     Appearance: She is well-developed.      Comments: Appears chronically ill, wearing oxygen   HENT:      Head: Normocephalic and atraumatic.      Right Ear: External ear normal.      Left Ear: External ear normal.      Nose: Nose normal. No congestion or rhinorrhea.      Mouth/Throat:      Mouth: Mucous membranes are moist.      Pharynx: Oropharynx is clear. No oropharyngeal exudate or posterior oropharyngeal erythema.   Eyes:      General: No scleral icterus.        Right eye: No discharge.         Left eye: No discharge.      Extraocular Movements: Extraocular movements intact.      Conjunctiva/sclera: Conjunctivae normal.   Neck:      Trachea: No tracheal deviation.   Cardiovascular:      Rate and Rhythm: Normal rate and regular rhythm.      Heart sounds: No murmur heard.     Pulmonary:      Effort: No respiratory distress.      Breath sounds: No wheezing.      Comments: Decreased breath sounds throughout  Abdominal:      General: There is no distension.      Palpations: Abdomen is soft.   Musculoskeletal:         General: No tenderness. Normal range of motion.      Cervical back: Normal range of motion and neck supple.      Right lower leg: No edema.      Left lower leg: No edema.      Comments: In wheelchair   Skin:     General: Skin is warm and dry.      Findings: Rash present.      Comments: Psoriasis plaques noted on bilateral hands   Neurological:      Mental Status: She is alert and oriented to person, place, and time.      " Coordination: Coordination normal.      Comments: Following commands   Psychiatric:         Mood and Affect: Mood normal.         Judgment: Judgment normal.       Mallampati Score: III (soft and hard palate and base of uvula visible)    Results Review:   Labs: Reviewed.  Lab Results   Component Value Date    WBC 10.13 08/20/2021    HGB 16.0 (H) 08/20/2021    HCT 46.8 (H) 08/20/2021    MCV 88.5 08/20/2021     08/20/2021     Lab Results   Component Value Date    GLUCOSE 242 (H) 08/20/2021    BUN 11 08/20/2021    CREATININE 1.01 (H) 08/20/2021    EGFRIFNONA 53 (L) 08/20/2021    BCR 10.9 08/20/2021    K 4.0 08/20/2021    CO2 26.5 08/20/2021    CALCIUM 9.1 08/20/2021    ALBUMIN 4.30 08/20/2021     (H) 08/20/2021    ALT 34 (H) 08/20/2021       No results found for: CBCDIF, CMP     Micro: As of September 10, 2021   No results found for: RESPCX  No results found for: BLOODCX  Lab Results   Component Value Date    URINECX 50,000 CFU/mL Streptococcus agalactiae (Group B) (A) 12/08/2020     No results found for: MRSACX  No results found for: MRSAPCR  No results found for: URCX  No components found for: LOWRESPCF  No results found for: THROATCX  No results found for: CULTURES  No components found for: STREPBCX  No results found for: STREPPNEUAG  No results found for: LEGIONELLA  No results found for: MYCOPLASCX  No results found for: GCCX  No results found for: WOUNDCX  No results found for: BODYFLDCX    ABG:   Lab Results   Component Value Date    PHART 7.341 (L) 08/20/2021    KJQ3GAQ 55.7 (C) 08/20/2021    PO2ART 85.3 08/20/2021    HGBBG 15.5 04/24/2020    L8ASHGJA 97.1 08/20/2021    CARBOXYHGB 5.5 (H) 08/20/2021       Echo: Results for orders placed in visit on 12/23/20    Adult Transthoracic Echo Complete W/ Cont if Necessary Per Protocol    Interpretation Summary  · Left ventricular wall thickness is consistent with borderline concentric hypertrophy.  · Left ventricular diastolic function is consistent with  (grade I) impaired relaxation.  · Estimated left ventricular EF = 66% Left ventricular ejection fraction appears to be 66 - 70%. Left ventricular systolic function is normal.  · Estimated right ventricular systolic pressure from tricuspid regurgitation is mildly elevated (35-45 mmHg).  · Mild pulmonary hypertension is present.      Results for orders placed in visit on 01/25/18    Adult Transthoracic Echo Complete W/ Cont if Necessary Per Protocol    Interpretation Summary  · Left ventricular systolic function is hyperdynamic (EF > 70).  · There is calcification of the aortic valve.  · LVOT turbulence with peak gradient 17mmHg.  · RVSP(TR) 18.0 mmHg      Radiology Scans:   Last CT scan was reviewed in great detail with the patient. Images reviewed personally.     Results for orders placed during the hospital encounter of 08/20/21    CT Chest Pulmonary Embolism    Narrative  FINAL REPORT    TECHNIQUE:  Thin section axial images were obtained through the chest and  during the arterial phase of IV contrast administration. Coronal  3-D MIP reconstructed images were also provided.    CLINICAL HISTORY:  PE suspected, low/intermediate prob, neg D-dimer    FINDINGS:  The pulmonary arteries are well-opacified and there is no  filling defect to indicate pulmonary embolism. The thoracic  aorta is normal. There is evidence of old calcified  granulomatous disease. The lungs are otherwise clear and there  is no pleural disease, adenopathy or significant osseous  abnormality.    Authenticated by Roney Mcknight M.D. on 08/20/2021 10:30:05 PM        PFT IMPRESSION:    None available for review    Assessment / Plan      Assessment/Plan:    1. Centrilobular emphysema (CMS/HCC)  Patient diagnosed clinically with COPD and emphysema, but emphysema changes noted on recent imaging studies.  Clinically improved with Trelegy and using oxygen 24/7.  Discussed that current regimen of Trelegy daily and as needed albuterol is appropriate triple  therapy for what I suspect is rather advanced COPD.  Check PFTs prior to next clinic visit once Covid precautions are lifted in the PFT lab.  Encouraged her to use oxygen 24/7 especially with ambulation.    - Pulmonary Function Test; Future  - Trelegy Ellipta 100-62.5-25 MCG/INH inhaler; Inhale 1 puff Daily.  Dispense: 3 each; Refill: 3  - albuterol sulfate  (90 Base) MCG/ACT inhaler; Inhale 2 puffs Every 4 (Four) Hours As Needed for Wheezing.  Dispense: 6.7 g; Refill: 5    2. Dyspnea on exertion  Most likely related to significant COPD.  Work-up as above.  Continue Trelegy.  Encourage consistent use of oxygen    3. Nicotine abuse  Patient states she is not ready to quit.    4. Daytime hypersomnolence  Multiple symptoms suggestive of sleep apnea.  Check sleep study    5. Dependence on supplemental oxygen  Recently started.  Encouraged consistent use.  Discussed the risks of smoking and her oxygen and she states that she never does.    6. Polycythemia, secondary  Most likely related to chronic hypoxia.  Repeat lab work at next clinic visit after consistent use of oxygen for 3 months.       Follow Up:   Return in about 3 months (around 12/10/2021).    Shahrzad Manriquez MD  Pulmonary/Critical Care Physician   [place]      Please note that portions of this note may have been completed with a voice recognition program. Efforts were made to edit the dictations, but occasionally words are mistranscribed.

## 2021-09-21 ENCOUNTER — TELEPHONE (OUTPATIENT)
Dept: CARDIOLOGY | Facility: CLINIC | Age: 76
End: 2021-09-21

## 2021-09-21 NOTE — TELEPHONE ENCOUNTER
Neurology requires an ENT visit before seeing patient, EUNICE Hunt has deferred this to patient's PCP.

## 2021-10-27 ENCOUNTER — OFFICE VISIT (OUTPATIENT)
Dept: PSYCHIATRY | Facility: CLINIC | Age: 76
End: 2021-10-27

## 2021-10-27 DIAGNOSIS — F33.0 MILD EPISODE OF RECURRENT MAJOR DEPRESSIVE DISORDER (HCC): ICD-10-CM

## 2021-10-27 DIAGNOSIS — F41.1 GENERALIZED ANXIETY DISORDER: Primary | ICD-10-CM

## 2021-10-27 PROCEDURE — 90791 PSYCH DIAGNOSTIC EVALUATION: CPT | Performed by: SOCIAL WORKER

## 2021-10-27 NOTE — PROGRESS NOTES
"Date Encounter: 10/27/2021   Time In: 1000  Time Out: 1040    Patient ID: Katharine Fitzpatrick is a 76 y.o. female presenting to [facility] for assessment with Juana Lyle LCSW.     Patient presents today for initial evaluation. Patient reports experiencing \"cardiac arrest\" last month. Patient reports she has been taking her medications and reports feeling good physically. Patient reports having a goal to stop taking her benzodiazepines in the future. Patient reports feeling frustrated that she is unable to share her feelings with her . Patient reports  has ongoing health problems and reports he is not emotional. Patient reports worries about her son and his difficulty finding a job. Patient also reports her  may have alzheimer's and reports his license has been taken away. Patient became tearful and reports having more responsibility than usual. Patient reports goal for therapy is \"feel more hopeful\".       Description of current emotional/behavioral concerns: patient endorses anxiety, worries about her son and , lack of energy, depressed mood, and feeling bad about herself.     Patient adamantly and convincingly denies current suicidal or homicidal ideation or perceptual disturbance.    Significant Life Events  Has patient been through or witnessed a divorce? no      Has patient experienced a death / loss of relationship? yes      Has patient experienced a major accident or tragic events? Yes, first  was killed      Has patient experienced any other significant life events or trauma (such as verbal, physical, sexual abuse)? no      Work History  Highest level of education obtained: [employer], associates degree    Ever been active duty in the ? no    Patient's Occupation: retired from nursing    Legal History  The patient has no significant history of legal issues.    Interpersonal/Relational  Marital Status:   Patient's " current living situation: home with , son living there temporarily  Support system: friends and patient siblings  Difficulty getting along with peers: no  Difficulty making new friendships: no  Difficulty maintaining friendships: no  Close with family members: yes    Mental/Behavioral Health History  History of prior treatment or hospitalization: [facility] few years ago.     Are there any significant health issues (current or past): patient reports cardiac recent, diabetes and back issues    History of seizures: no    Family History   Problem Relation Age of Onset   • Uterine cancer Mother         widely metastatic   • Breast cancer Mother    • Arthritis Mother    • Heart attack Father    • Arthritis Father    • Hypertension Father    • Diabetes Other        Current Medications:   Current Outpatient Medications   Medication Sig Dispense Refill   • albuterol sulfate  (90 Base) MCG/ACT inhaler Inhale 2 puffs Every 4 (Four) Hours As Needed for Wheezing. 6.7 g 5   • amLODIPine (NORVASC) 5 MG tablet Take 5 mg by mouth Daily.     • ARIPiprazole (ABILIFY) 5 MG tablet aripiprazole 5 mg tablet     • atorvastatin (LIPITOR) 20 MG tablet atorvastatin 20 mg tablet   TAKE 1 TABLET BY MOUTH EVERY DAY     • clonazePAM (KlonoPIN) 1 MG tablet Take 1 mg by mouth 3 (Three) Times a Day As Needed.     • Diclofenac Sodium (VOLTAREN) 1 % gel gel      • fenofibrate 160 MG tablet Take 160 mg by mouth Daily.     • HYDROcodone-acetaminophen (NORCO) 7.5-325 MG per tablet Take 1 tablet by mouth Daily.     • insulin aspart (novoLOG FLEXPEN) 100 UNIT/ML solution pen-injector sc pen For use at mealtimes and per correctional scale, up to 30 units daily 9 mL 5   • Insulin Glargine (BASAGLAR KWIKPEN) 100 UNIT/ML injection pen Inject 15 Units under the skin into the appropriate area as directed Daily. 6 mL 5   • ipratropium-albuterol (DUO-NEB) 0.5-2.5 mg/3 ml nebulizer USE 1 AMPULES IN NEBULIZER FOUR TIMES A DAY AS DIRECTED      • isosorbide dinitrate (ISORDIL) 30 MG tablet Take 30 mg by mouth Daily.     • LORazepam (ATIVAN) 1 MG tablet Take 1 mg by mouth Every 8 (Eight) Hours As Needed for Anxiety.     • losartan (COZAAR) 50 MG tablet Take 1 tablet by mouth Daily. 90 tablet 3   • methocarbamol (ROBAXIN) 500 MG tablet Take 1,000 mg by mouth 2 (two) times a day. Patient takes 2 tablets 2 times a day.      • methotrexate 2.5 MG tablet      • metoprolol tartrate (LOPRESSOR) 50 MG tablet Take 1 tablet by mouth 2 (Two) Times a Day. 180 tablet 3   • nicotine polacrilex (COMMIT) 2 MG lozenge nicotine (polacrilex) 2 mg buccal lozenge   DISSOLVE 1 LOZENGE (2 MG TOTAL) IN THE MOUTH IF NEEDED FOR SMOKING CESSATION.     • nitrofurantoin (MACRODANTIN) 100 MG capsule Take 100 mg by mouth Every Night.     • nitroglycerin (NITROSTAT) 0.4 MG SL tablet 1 under the tongue as needed for angina, may repeat q5mins for up three doses (Patient taking differently: Place 0.4 mg under the tongue Every 5 (Five) Minutes As Needed (pt states has never taken this). 1 under the tongue as needed for angina, may repeat q5mins for up three doses) 100 tablet 11   • rosuvastatin (CRESTOR) 10 MG tablet Take 10 mg by mouth Daily.     • SUMAtriptan (IMITREX) 50 MG tablet Take 50 mg by mouth As Needed.     • Trelegy Ellipta 100-62.5-25 MCG/INH inhaler Inhale 1 puff Daily. 3 each 3   • triamcinolone (KENALOG) 0.1 % cream Apply 1 application topically to the appropriate area as directed Daily.     • venlafaxine XR (EFFEXOR-XR) 150 MG 24 hr capsule TAKE 1 CAPSULE TWICE DAILY (Patient taking differently: Take 150 mg by mouth 2 (Two) Times a Day.) 180 capsule 3     No current facility-administered medications for this visit.       History of Substance Use:   Patient answered no  to experiencing two or more of the following problems related to substance use: using more than intended or over longer period than intended; difficulty quitting or cutting back use; spending a great deal of  "time obtaining, using, or recovering from using; craving or strong desire or urge to use;  work and/or school problems; financial problems; family problems; using in dangerous situations; physical or mental health problems; relapse; feelings of guilt or remorse about use; times when used and/or drank alone; needing to use more in order to achieve the desired effect; illness or withdrawal when stopping or cutting back use; using to relieve or avoid getting ill or developing withdrawal symptoms; and black outs and/or memory issues when using.        Substance Age Frequency Amount Method Last use   Nicotine na       Alcohol  \"occasionally\"      Marijuana        Benzo        Pain Pills        Cocaine        Meth        Heroin        Suboxone        Synthetics/Other:                SUICIDE RISK ASSESSMENT/CSSRS  1. Does patient have thoughts of suicide? no  2. Does patient have intent for suicide? no  3. Does patient have a current plan for suicide? no  4. History of suicide attempts: no  5. Family history of suicide or attempts: no  6. History of violent behaviors towards others or property or thoughts of committing suicide: no  7. History of sexual aggression toward others: no  8. Access to firearms or weapons: yes, secured    Mental Status Exam:   Hygiene:   good  Cooperation:  Cooperative  Eye Contact:  Good  Psychomotor Behavior:  Appropriate  Affect:  Blunted  Mood: sad and anxious  Hopelessness: 1  Speech:  Normal  Thought Process:  Goal directed  Thought Content:  Normal  Suicidal:  None  Homicidal:  None  Hallucinations:  None  Delusion:  None  Memory:  Intact  Orientation:  Grossly intact  Reliability:  fair  Insight:  Poor  Judgement:  Poor  Impulse Control:  Fair    Impression/Formulation:    VISIT DIAGNOSIS:     ICD-10-CM ICD-9-CM   1. Generalized anxiety disorder  F41.1 300.02   2. Mild episode of recurrent major depressive disorder (HCC)  F33.0 296.31        (Scales based on 0 - 10 with 10 being the " "worst)  Depression:  na Anxiety:  na       Patient appeared alert and oriented.  Patient is voluntarily requesting to begin outpatient therapy at [facility].  Patient is receptive to assistance with maintaining a stable lifestyle.  Patient presents with history of anxiety and depression.  Patient is agreeable to attend routine therapy sessions.  Patient expressed desire to maintain stability and participate in the therapeutic process.      Crisis Plan:  Symptoms and/or behaviors to indicate a crisis: Excessive worry or fear, Feeling sad or low, Extreme mood changes; including uncontrollable \"highs\" or euphoria, Prolonged irritability or anger, Isolation, Lack of sleep, Increased hunger or lack of appetite, Difficulty perceiving reality , Abuse of substances, Physical ailments without obvious causes, Thinking about suicide and Self-doubt    What calming techniques or other strategies will patient use to de-esclate and stay safe: slow down, breathe, visualize calming self, think it though, listen to music, change focus, take a walk    Who is one person patient can contact to assist with de-escalation?     If symptoms/behaviors persist, patient will present to the nearest hospital for an assessment. Advised patient of [facility] 24/7 assessment services.       Plan:   Obtain release of information for current treatment team for continuity of care  Patient will adhere to medication regimen as prescribed and report any side effects. Patient will contact this office, call 911 or present to the nearest emergency room should suicidal or homicidal ideations occur.  Begin psychotherapy    Follow up scheduled for Return in about 2 weeks (around 11/10/2021) for Next scheduled follow up.           This document has been electronically signed by Juana Lyle LCSW  October 27, 2021 09:58 EDT    Part of this note may be an electronic transcription/translation of spoken language to printed " text using the Dragon Dictation System.

## 2021-11-13 ENCOUNTER — APPOINTMENT (OUTPATIENT)
Dept: GENERAL RADIOLOGY | Facility: HOSPITAL | Age: 76
End: 2021-11-13

## 2021-11-13 ENCOUNTER — HOSPITAL ENCOUNTER (EMERGENCY)
Facility: HOSPITAL | Age: 76
Discharge: HOME | End: 2021-11-13
Attending: FAMILY MEDICINE | Admitting: FAMILY MEDICINE

## 2021-11-13 VITALS
RESPIRATION RATE: 16 BRPM | WEIGHT: 189 LBS | OXYGEN SATURATION: 95 % | BODY MASS INDEX: 30.37 KG/M2 | TEMPERATURE: 98.5 F | HEART RATE: 67 BPM | SYSTOLIC BLOOD PRESSURE: 163 MMHG | HEIGHT: 66 IN | DIASTOLIC BLOOD PRESSURE: 63 MMHG

## 2021-11-13 DIAGNOSIS — S82.025A CLOSED NONDISPLACED LONGITUDINAL FRACTURE OF LEFT PATELLA, INITIAL ENCOUNTER: Primary | ICD-10-CM

## 2021-11-13 LAB — GLUCOSE BLDC GLUCOMTR-MCNC: 308 MG/DL (ref 70–130)

## 2021-11-13 PROCEDURE — 82962 GLUCOSE BLOOD TEST: CPT

## 2021-11-13 PROCEDURE — 73590 X-RAY EXAM OF LOWER LEG: CPT

## 2021-11-13 PROCEDURE — 73562 X-RAY EXAM OF KNEE 3: CPT

## 2021-11-13 PROCEDURE — 99283 EMERGENCY DEPT VISIT LOW MDM: CPT

## 2021-11-14 NOTE — DISCHARGE INSTRUCTIONS
Keep knee immobilizer in place until follow-up with Dr. Mandujano. Take home pain medications for pain control. Rest, ice, and elevate the leg while at home. Follow up with your Primary Care Doctor as needed. Return to the ER for new/worsening symptoms.

## 2021-11-14 NOTE — ED PROVIDER NOTES
"Subjective   History of Present Illness   Patient is a 76-year-old female with multiple comorbidities presenting to the ER with complaints of left knee and lower extremity pain secondary to a mechanical fall at home in which she tripped over some rugs.  Patient denies head trauma and loss of consciousness.  She is not on blood thinners.  She denies additional injuries or complaints at this time.    Review of Systems   Musculoskeletal:        Left knee pain   All other systems reviewed and are negative.      Past Medical History:   Diagnosis Date   • Anesthesia 02/14/2020    Patient reported she is slow to wake after anesthesia   • Angina at rest ([facility])    • Anxiety    • Arthritis    • Asthma    • Body piercing     both ears   • Cataract, bilateral     s/p surgery   • Colon polyp     adenomatous polyps   • Depression    • Diabetes mellitus ([facility])    • Elevated cholesterol    • Frequent UTI    • Headache    • Heart murmur    • History of chest pain 02/14/2020    Patient reported \"they think it's nerves\" and reported last episode was 6-7 months ago   • Hyperlipidemia    • Hypertension    • Migraines    • Peptic ulceration    • RA (rheumatoid arthritis) ([facility])    • Skin cancer     face   • Type 2 diabetes mellitus ([facility])    • Urinary tract infection    • Wears dentures     full set   • Wears glasses        Allergies   Allergen Reactions   • Ceftin [Cefuroxime Axetil] Diarrhea and Nausea Only     MODERATE DIARRHEA   • Quinapril Cough   • Codeine Rash   • Penicillins Rash       Past Surgical History:   Procedure Laterality Date   • ABDOMINAL HYSTERECTOMY  2007   • APPENDECTOMY     • COLONOSCOPY  2015?   • COLONOSCOPY N/A 2/17/2020    Procedure: COLONOSCOPY WITH COLD FORCEP POLYPECTOMY;  Surgeon: Belkis Peraza MD;  Location: [facility] ENDOSCOPY;  Service: Gastroenterology;  Laterality: N/A;   • COLONOSCOPY W/ POLYPECTOMY     • LAPAROSCOPIC CHOLECYSTECTOMY  2010   • MOUTH SURGERY      full extraction of teeth   • SALPINGO " OOPHORECTOMY Bilateral 2009   • SKIN BIOPSY     • SPINAL FUSION  2002       Family History   Problem Relation Age of Onset   • Uterine cancer Mother         widely metastatic   • Breast cancer Mother    • Arthritis Mother    • Heart attack Father    • Arthritis Father    • Hypertension Father    • Diabetes Other        Social History     Socioeconomic History   • Marital status:    Tobacco Use   • Smoking status: Current Every Day Smoker     Packs/day: 0.50     Years: 56.00     Pack years: 28.00     Types: Cigarettes   • Smokeless tobacco: Never Used   • Tobacco comment: smokes 1 cig a day    Vaping Use   • Vaping Use: Never used   Substance and Sexual Activity   • Alcohol use: Not Currently     Comment: occa   • Drug use: No   • Sexual activity: Defer           Objective   Physical Exam  Vitals and nursing note reviewed.   Constitutional:       General: She is not in acute distress.     Appearance: She is not toxic-appearing.   HENT:      Head: Normocephalic and atraumatic.      Right Ear: External ear normal.      Left Ear: External ear normal.      Nose: Nose normal.   Eyes:      Extraocular Movements: Extraocular movements intact.      Conjunctiva/sclera: Conjunctivae normal.   Cardiovascular:      Rate and Rhythm: Normal rate.      Heart sounds: Normal heart sounds.   Pulmonary:      Effort: Pulmonary effort is normal. No respiratory distress.   Abdominal:      General: There is no distension.      Palpations: Abdomen is soft.   Musculoskeletal:         General: Swelling and tenderness present.      Cervical back: Normal range of motion and neck supple.      Comments: LLE is neurovascularly intact, obvious swelling to patella, pain with any ROM of knee   Skin:     General: Skin is warm and dry.   Neurological:      General: No focal deficit present.      Mental Status: She is alert and oriented to person, place, and time.   Psychiatric:         Mood and Affect: Mood normal.         Behavior: Behavior  normal.         Procedures           ED Course  ED Course as of 11/13/21 2330   Sat Nov 13, 2021   2328 Glucose(!): 308 [AP]      ED Course User Index  [AP] Anne Story PA-C                                           [facility]   Patient was evaluated in the ER for mechanical fall with left knee pain.  Vitals are within normal limits.  XR performed, reviewed by myself and ED attending, patellar fracture identified.  Patient was discussed with Dr. Mandujano who reviewed x-ray and is agreeable with plan for knee immobilizer and follow-up referral.  He will see her outpatient.  Patient has [place] at home.  She is agreeable with plan for discharge and outpatient orthopedic follow-up.  She was also advised to follow-up with her PCP as needed.  Precautions were given for return to the ER for any new or worsening symptoms.    Final diagnoses:   Closed nondisplaced longitudinal fracture of left patella, initial encounter       ED Disposition  ED Disposition     ED Disposition Condition Comment    Discharge Stable           Murtaza Elder MD  [address]  [address]  [facility] [id number] [phone]    Call   As needed    [facility] Emergency Department  [address] [address] [phone]  Go to   As needed, If symptoms worsen    Joe Mandujano MD  [address]  [facility] [place] [address] [phone]    Schedule an appointment as soon as possible for a visit   for re-evaluation of patella fracture         Medication List      Changed    nitroglycerin 0.4 MG SL tablet  Commonly known as: NITROSTAT  1 under the tongue as needed for angina, may repeat q5mins for up three doses  What changed:   · how much to take  · how to take this  · when to take this  · reasons to take this             Anne Story PA-C  11/13/21 9970

## 2021-12-10 ENCOUNTER — LAB (OUTPATIENT)
Dept: LAB | Facility: HOSPITAL | Age: 76
End: 2021-12-10

## 2021-12-10 ENCOUNTER — OFFICE VISIT (OUTPATIENT)
Dept: PULMONOLOGY | Facility: CLINIC | Age: 76
End: 2021-12-10

## 2021-12-10 VITALS
SYSTOLIC BLOOD PRESSURE: 130 MMHG | DIASTOLIC BLOOD PRESSURE: 60 MMHG | OXYGEN SATURATION: 96 % | HEART RATE: 47 BPM | RESPIRATION RATE: 18 BRPM | BODY MASS INDEX: 31.66 KG/M2 | WEIGHT: 197 LBS | HEIGHT: 66 IN

## 2021-12-10 DIAGNOSIS — G47.19 DAYTIME HYPERSOMNOLENCE: ICD-10-CM

## 2021-12-10 DIAGNOSIS — J44.9 STAGE 3 SEVERE COPD BY GOLD CLASSIFICATION (HCC): Primary | ICD-10-CM

## 2021-12-10 DIAGNOSIS — D75.1 POLYCYTHEMIA, SECONDARY: ICD-10-CM

## 2021-12-10 DIAGNOSIS — R06.09 DYSPNEA ON EXERTION: ICD-10-CM

## 2021-12-10 DIAGNOSIS — Z99.81 DEPENDENCE ON SUPPLEMENTAL OXYGEN: ICD-10-CM

## 2021-12-10 DIAGNOSIS — J43.2 CENTRILOBULAR EMPHYSEMA (HCC): ICD-10-CM

## 2021-12-10 DIAGNOSIS — Z72.0 NICOTINE ABUSE: ICD-10-CM

## 2021-12-10 LAB
BASOPHILS # BLD AUTO: 0.11 10*3/MM3 (ref 0–0.2)
BASOPHILS NFR BLD AUTO: 1.3 % (ref 0–1.5)
DEPRECATED RDW RBC AUTO: 39.3 FL (ref 37–54)
EOSINOPHIL # BLD AUTO: 0.39 10*3/MM3 (ref 0–0.4)
EOSINOPHIL NFR BLD AUTO: 4.6 % (ref 0.3–6.2)
ERYTHROCYTE [DISTWIDTH] IN BLOOD BY AUTOMATED COUNT: 11.9 % (ref 12.3–15.4)
HCT VFR BLD AUTO: 37.6 % (ref 34–46.6)
HGB BLD-MCNC: 12.6 G/DL (ref 12–15.9)
IMM GRANULOCYTES # BLD AUTO: 0.05 10*3/MM3 (ref 0–0.05)
IMM GRANULOCYTES NFR BLD AUTO: 0.6 % (ref 0–0.5)
LYMPHOCYTES # BLD AUTO: 1.79 10*3/MM3 (ref 0.7–3.1)
LYMPHOCYTES NFR BLD AUTO: 21.1 % (ref 19.6–45.3)
MCH RBC QN AUTO: 30.3 PG (ref 26.6–33)
MCHC RBC AUTO-ENTMCNC: 33.5 G/DL (ref 31.5–35.7)
MCV RBC AUTO: 90.4 FL (ref 79–97)
MONOCYTES # BLD AUTO: 0.75 10*3/MM3 (ref 0.1–0.9)
MONOCYTES NFR BLD AUTO: 8.8 % (ref 5–12)
NEUTROPHILS NFR BLD AUTO: 5.39 10*3/MM3 (ref 1.7–7)
NEUTROPHILS NFR BLD AUTO: 63.6 % (ref 42.7–76)
NRBC BLD AUTO-RTO: 0 /100 WBC (ref 0–0.2)
PLATELET # BLD AUTO: 313 10*3/MM3 (ref 140–450)
PMV BLD AUTO: 9.5 FL (ref 6–12)
RBC # BLD AUTO: 4.16 10*6/MM3 (ref 3.77–5.28)
WBC NRBC COR # BLD: 8.48 10*3/MM3 (ref 3.4–10.8)

## 2021-12-10 PROCEDURE — 99214 OFFICE O/P EST MOD 30 MIN: CPT | Performed by: INTERNAL MEDICINE

## 2021-12-10 PROCEDURE — 94060 EVALUATION OF WHEEZING: CPT | Performed by: INTERNAL MEDICINE

## 2021-12-10 PROCEDURE — 36415 COLL VENOUS BLD VENIPUNCTURE: CPT

## 2021-12-10 PROCEDURE — 94729 DIFFUSING CAPACITY: CPT | Performed by: INTERNAL MEDICINE

## 2021-12-10 PROCEDURE — 85025 COMPLETE CBC W/AUTO DIFF WBC: CPT

## 2021-12-10 PROCEDURE — 94726 PLETHYSMOGRAPHY LUNG VOLUMES: CPT | Performed by: INTERNAL MEDICINE

## 2021-12-10 RX ORDER — INSULIN ASPART 100 [IU]/ML
12 INJECTION, SUSPENSION SUBCUTANEOUS
COMMUNITY

## 2021-12-10 RX ORDER — OXYCODONE HYDROCHLORIDE 5 MG/1
TABLET ORAL
COMMUNITY
Start: 2021-11-30 | End: 2022-06-21

## 2021-12-10 RX ORDER — LOSARTAN POTASSIUM 100 MG/1
TABLET ORAL
COMMUNITY
Start: 2021-11-25 | End: 2021-12-10 | Stop reason: DRUGHIGH

## 2021-12-10 RX ORDER — FOLIC ACID 1 MG/1
TABLET ORAL
COMMUNITY
Start: 2021-11-18

## 2021-12-10 RX ORDER — METOPROLOL SUCCINATE 50 MG/1
1 TABLET, EXTENDED RELEASE ORAL 2 TIMES DAILY
COMMUNITY

## 2021-12-10 RX ORDER — ISOSORBIDE MONONITRATE 30 MG/1
30 TABLET, EXTENDED RELEASE ORAL
COMMUNITY

## 2021-12-10 RX ORDER — ALBUTEROL SULFATE 90 UG/1
2 AEROSOL, METERED RESPIRATORY (INHALATION) EVERY 4 HOURS PRN
Qty: 6.7 G | Refills: 5 | Status: SHIPPED | OUTPATIENT
Start: 2021-12-10

## 2021-12-10 RX ORDER — BLOOD SUGAR DIAGNOSTIC
STRIP MISCELLANEOUS
COMMUNITY
Start: 2021-11-25

## 2021-12-10 NOTE — PROGRESS NOTES
Pulmonary follow-up office Visit      Chief Complaint:    Chief Complaint   Patient presents with   • Follow-up     PFT today.        Subjective: Katharine Fitzpatrick is a 76 y.o. female who is here today for follow-up.    Since last visit, had PFTs that showed severe COPD.  Patient admits she is not using oxygen on a consistent basis and continues to smoke.  Sleep study ordered at last clinic visit, but stated she does not want to get a sleep study now.    Duration: COPD diagnosed in 2020   severity: Severe with FEV1 49%   associated Symptoms:  Chronic cough with intermittent clear mucus, intermittent wheezing, no fevers or chills, no hemoptysis  Exercise Tolerance: Walking on flat ground for around 300 feet which is unchanged   timing: daily  Exacerbating Factors: exertion  Relieving Factors: rest and inhalers    Requires antibiotics/steroids 1x per year.  Still has nonrestorative sleep.    Current Regimen:   Inhalers: Trelegy daily and prn albuterol, but admits she does not use albuterol on a regular basis  Nebs:  duonebs prn      Supplemental Oxygen: 3L, but not currently wearing it during clinic visit  DME Company: [employer]      Subjective      Review of Systems:   Review of Systems   Constitutional: Positive for fatigue. Negative for chills, fever, unexpected weight gain and unexpected weight loss.   HENT: Positive for hearing loss. Negative for postnasal drip, rhinorrhea, sinus pressure, trouble swallowing and voice change.    Eyes: Negative for discharge and itching.   Respiratory: Positive for cough and shortness of breath. Negative for wheezing.    Cardiovascular: Negative for chest pain, palpitations and leg swelling.        Stress test scheduled   Gastrointestinal: Negative for abdominal distention, constipation, diarrhea and GERD.   Endocrine: Negative for cold intolerance and heat intolerance.        + known DM   Genitourinary: Negative for dysuria and hematuria.   Musculoskeletal: Positive for  arthralgias and back pain. Negative for myalgias.   Skin: Negative for color change and rash.   Allergic/Immunologic: Negative for environmental allergies and food allergies.   Neurological: Positive for headache. Negative for dizziness, speech difficulty, light-headedness and memory problem.   Hematological: Negative for adenopathy. Does not bruise/bleed easily.   Psychiatric/Behavioral: Positive for depressed mood. Negative for sleep disturbance. The patient is not nervous/anxious.        Medications:     Current Outpatient Medications:   •  Accu-Chek Guide test strip, , Disp: , Rfl:   •  albuterol sulfate  (90 Base) MCG/ACT inhaler, Inhale 2 puffs Every 4 (Four) Hours As Needed for Wheezing., Disp: 6.7 g, Rfl: 5  •  amLODIPine (NORVASC) 5 MG tablet, Take 5 mg by mouth Daily., Disp: , Rfl:   •  ARIPiprazole (ABILIFY) 5 MG tablet, aripiprazole 5 mg tablet, Disp: , Rfl:   •  atorvastatin (LIPITOR) 20 MG tablet, atorvastatin 20 mg tablet  TAKE 1 TABLET BY MOUTH EVERY DAY, Disp: , Rfl:   •  clonazePAM (KlonoPIN) 1 MG tablet, Take 1 mg by mouth 3 (Three) Times a Day As Needed., Disp: , Rfl:   •  fenofibrate 160 MG tablet, Take 160 mg by mouth Daily., Disp: , Rfl:   •  folic acid (FOLVITE) 1 MG tablet, , Disp: , Rfl:   •  insulin aspart (novoLOG FLEXPEN) 100 UNIT/ML solution pen-injector sc pen, For use at mealtimes and per correctional scale, up to 30 units daily, Disp: 9 mL, Rfl: 5  •  Insulin Glargine (BASAGLAR KWIKPEN) 100 UNIT/ML injection pen, Inject 15 Units under the skin into the appropriate area as directed Daily., Disp: 6 mL, Rfl: 5  •  ipratropium-albuterol (DUO-NEB) 0.5-2.5 mg/3 ml nebulizer, USE 1 AMPULES IN NEBULIZER FOUR TIMES A DAY AS DIRECTED, Disp: , Rfl:   •  isosorbide mononitrate (IMDUR) 30 MG 24 hr tablet, Take 30 mg by mouth., Disp: , Rfl:   •  LORazepam (ATIVAN) 1 MG tablet, Take 1 mg by mouth Every 8 (Eight) Hours As Needed for Anxiety., Disp: , Rfl:   •  losartan (COZAAR) 50 MG tablet,  Take 1 tablet by mouth Daily., Disp: 90 tablet, Rfl: 3  •  methocarbamol (ROBAXIN) 500 MG tablet, Take 1,000 mg by mouth 2 (two) times a day. Patient takes 2 tablets 2 times a day. , Disp: , Rfl:   •  methotrexate 2.5 MG tablet, , Disp: , Rfl:   •  metoprolol succinate XL (TOPROL-XL) 50 MG 24 hr tablet, Take 1 tablet by mouth 2 (Two) Times a Day., Disp: , Rfl:   •  nicotine polacrilex (COMMIT) 2 MG lozenge, nicotine (polacrilex) 2 mg buccal lozenge  DISSOLVE 1 LOZENGE (2 MG TOTAL) IN THE MOUTH IF NEEDED FOR SMOKING CESSATION., Disp: , Rfl:   •  nitrofurantoin (MACRODANTIN) 100 MG capsule, Take 100 mg by mouth Every Night., Disp: , Rfl:   •  nitroglycerin (NITROSTAT) 0.4 MG SL tablet, 1 under the tongue as needed for angina, may repeat q5mins for up three doses (Patient taking differently: Place 0.4 mg under the tongue Every 5 (Five) Minutes As Needed (pt states has never taken this). 1 under the tongue as needed for angina, may repeat q5mins for up three doses), Disp: 100 tablet, Rfl: 11  •  oxyCODONE (ROXICODONE) 5 MG immediate release tablet, , Disp: , Rfl:   •  rosuvastatin (CRESTOR) 10 MG tablet, Take 10 mg by mouth Daily., Disp: , Rfl:   •  SUMAtriptan (IMITREX) 50 MG tablet, Take 50 mg by mouth As Needed., Disp: , Rfl:   •  Trelegy Ellipta 100-62.5-25 MCG/INH inhaler, Inhale 1 puff Daily., Disp: 3 each, Rfl: 3  •  triamcinolone (KENALOG) 0.1 % cream, Apply 1 application topically to the appropriate area as directed Daily., Disp: , Rfl:   •  venlafaxine XR (EFFEXOR-XR) 150 MG 24 hr capsule, TAKE 1 CAPSULE TWICE DAILY (Patient taking differently: Take 150 mg by mouth 2 (Two) Times a Day.), Disp: 180 capsule, Rfl: 3  •  Diclofenac Sodium (VOLTAREN) 1 % gel gel, , Disp: , Rfl:   •  HYDROcodone-acetaminophen (NORCO) 7.5-325 MG per tablet, Take 1 tablet by mouth Daily., Disp: , Rfl:   •  insulin aspart prot & aspart (NovoLOG Mix 70/30 FlexPen) (70-30) 100 UNIT/ML suspension pen-injector injection, Inject 12 Units  "under the skin into the appropriate area as directed., Disp: , Rfl:     Allergies:   Allergies   Allergen Reactions   • Ceftin [Cefuroxime Axetil] Diarrhea and Nausea Only     MODERATE DIARRHEA   • Quinapril Cough   • Codeine Rash   • Penicillins Rash       Objective     Physical Exam:  Vital Signs:   Vitals:    12/10/21 0957   BP: 130/60   Pulse: (!) 47   Resp: 18   SpO2: 96%   Weight: 89.4 kg (197 lb)   Height: 167.6 cm (66\")       Physical Exam  Vitals and nursing note reviewed.   Constitutional:       General: She is not in acute distress.     Appearance: She is well-developed.      Comments: Appears chronically ill, wearing oxygen   HENT:      Head: Normocephalic and atraumatic.      Right Ear: Ear canal and external ear normal.      Left Ear: Ear canal and external ear normal.      Nose: Nose normal. No congestion or rhinorrhea.      Mouth/Throat:      Mouth: Mucous membranes are moist.      Pharynx: Oropharynx is clear. No oropharyngeal exudate or posterior oropharyngeal erythema.   Eyes:      General: No scleral icterus.        Right eye: No discharge.         Left eye: No discharge.      Extraocular Movements: Extraocular movements intact.      Conjunctiva/sclera: Conjunctivae normal.   Neck:      Trachea: No tracheal deviation.   Cardiovascular:      Rate and Rhythm: Normal rate and regular rhythm.      Heart sounds: No murmur heard.      Pulmonary:      Effort: No respiratory distress.      Breath sounds: Wheezing present.      Comments: Decreased breath sounds throughout  Abdominal:      General: There is no distension.      Palpations: Abdomen is soft.   Musculoskeletal:         General: No tenderness. Normal range of motion.      Cervical back: Normal range of motion and neck supple.      Right lower leg: No edema.      Left lower leg: No edema.      Comments: In wheelchair, but has brace on left knee from recent fall   Skin:     General: Skin is warm and dry.      Findings: Rash present.      " Comments: Psoriasis plaques noted on bilateral hands   Neurological:      Mental Status: She is alert and oriented to person, place, and time.      Coordination: Coordination normal.      Comments: Following commands   Psychiatric:         Mood and Affect: Mood normal.         Judgment: Judgment normal.       Mallampati Score: III (soft and hard palate and base of uvula visible)    Results Review:   Labs: Reviewed.  Lab Results   Component Value Date    WBC 10.13 08/20/2021    HGB 16.0 (H) 08/20/2021    HCT 46.8 (H) 08/20/2021    MCV 88.5 08/20/2021     08/20/2021       Lab Results   Component Value Date    GLUCOSE 242 (H) 08/20/2021    BUN 11 08/20/2021    CREATININE 1.01 (H) 08/20/2021    EGFRIFNONA 53 (L) 08/20/2021    BCR 10.9 08/20/2021    K 4.0 08/20/2021    CO2 26.5 08/20/2021    CALCIUM 9.1 08/20/2021    ALBUMIN 4.30 08/20/2021     (H) 08/20/2021    ALT 34 (H) 08/20/2021       No results found for: CBCDIF, CMP     Micro: As of December 10, 2021   No results found for: RESPCX  No results found for: BLOODCX  Lab Results   Component Value Date    URINECX 50,000 CFU/mL Streptococcus agalactiae (Group B) (A) 12/08/2020     No results found for: MRSACX  No results found for: MRSAPCR  No results found for: URCX  No components found for: LOWRESPCF  No results found for: THROATCX  No results found for: CULTURES  No components found for: STREPBCX  No results found for: STREPPNEUAG  No results found for: LEGIONELLA  No results found for: MYCOPLASCX  No results found for: GCCX  No results found for: WOUNDCX  No results found for: BODYFLDCX    ABG:   Lab Results   Component Value Date    PHART 7.341 (L) 08/20/2021    AIH7HRO 55.7 (C) 08/20/2021    PO2ART 85.3 08/20/2021    HGBBG 15.5 04/24/2020    Y7FKDITZ 97.1 08/20/2021    CARBOXYHGB 5.5 (H) 08/20/2021       Echo: Results for orders placed in visit on 12/23/20    Adult Transthoracic Echo Complete W/ Cont if Necessary Per Protocol    Interpretation  Summary  · Left ventricular wall thickness is consistent with borderline concentric hypertrophy.  · Left ventricular diastolic function is consistent with (grade I) impaired relaxation.  · Estimated left ventricular EF = 66% Left ventricular ejection fraction appears to be 66 - 70%. Left ventricular systolic function is normal.  · Estimated right ventricular systolic pressure from tricuspid regurgitation is mildly elevated (35-45 mmHg).  · Mild pulmonary hypertension is present.      Results for orders placed in visit on 01/25/18    Adult Transthoracic Echo Complete W/ Cont if Necessary Per Protocol    Interpretation Summary  · Left ventricular systolic function is hyperdynamic (EF > 70).  · There is calcification of the aortic valve.  · LVOT turbulence with peak gradient 17mmHg.  · RVSP(TR) 18.0 mmHg      Radiology Scans:   Last CT scan was reviewed in great detail with the patient. Images reviewed personally.     Results for orders placed during the hospital encounter of 08/20/21    CT Chest Pulmonary Embolism    Narrative  FINAL REPORT    TECHNIQUE:  Thin section axial images were obtained through the chest and  during the arterial phase of IV contrast administration. Coronal  3-D MIP reconstructed images were also provided.    CLINICAL HISTORY:  PE suspected, low/intermediate prob, neg D-dimer    FINDINGS:  The pulmonary arteries are well-opacified and there is no  filling defect to indicate pulmonary embolism. The thoracic  aorta is normal. There is evidence of old calcified  granulomatous disease. The lungs are otherwise clear and there  is no pleural disease, adenopathy or significant osseous  abnormality.    Authenticated by Roney Mcknight M.D. on 08/20/2021 10:30:05 PM        PFT IMPRESSION:    December 2021 PFT showed FEV1 49%, FEV1/FVC ratio 51.  No significant bronchodilator responsiveness.  %.  Air-trapping noted.  ERV 29%.  Decreased diffusing capacity.    Assessment / Plan      Assessment/Plan:     1. Stage 3 severe COPD by GOLD classification (HCC)  Reviewed PFTs with patient and her daughter today during clinic.  Showed severe COPD and discussed that triple therapy with Trelegy is appropriate treatment.  No frequent exacerbations currently.  Advised her to use albuterol when needed.  Patient was under the impression that she needed to use it in emergencies only which she interpreted to mean if she felt like she needed to go to the hospital and not if she just had increased symptoms.  - albuterol sulfate  (90 Base) MCG/ACT inhaler; Inhale 2 puffs Every 4 (Four) Hours As Needed for Wheezing.  Dispense: 6.7 g; Refill: 5  - Trelegy Ellipta 100-62.5-25 MCG/INH inhaler; Inhale 1 puff Daily.  Dispense: 3 each; Refill: 3    2. Polycythemia, secondary  Discussed need for repeat lab work today.  Discussed that this is related to her chronic hypoxia needs to consistently use oxygen 24/7.  She expressed understanding.  - CBC Auto Differential; Future    3. Dyspnea on exertion  Most likely related to her severe COPD and noncompliance with oxygen.  Work-up as above  - CBC Auto Differential; Future    4. Dependence on supplemental oxygen  Encouraged more consistent use    5. Nicotine abuse  Tobacco abuse cessation counseling for greater than 3 minutes.  Patient states she is not ready to quit.    6. Daytime hypersomnolence  Refuses sleep study      Follow Up:   Return in about 4 months (around 4/10/2022), or with In[facility].    Shahrzad Manriquez MD  Pulmonary/Critical Care Physician   [facility]      Please note that portions of this note may have been completed with a voice recognition program. Efforts were made to edit the dictations, but occasionally words are mistranscribed.

## 2022-01-08 ENCOUNTER — APPOINTMENT (OUTPATIENT)
Dept: GENERAL RADIOLOGY | Facility: HOSPITAL | Age: 77
End: 2022-01-08

## 2022-01-08 ENCOUNTER — HOSPITAL ENCOUNTER (EMERGENCY)
Facility: HOSPITAL | Age: 77
Discharge: HOME | End: 2022-01-08
Attending: EMERGENCY MEDICINE | Admitting: EMERGENCY MEDICINE

## 2022-01-08 VITALS
RESPIRATION RATE: 18 BRPM | SYSTOLIC BLOOD PRESSURE: 138 MMHG | BODY MASS INDEX: 30.37 KG/M2 | HEIGHT: 66 IN | OXYGEN SATURATION: 95 % | HEART RATE: 71 BPM | DIASTOLIC BLOOD PRESSURE: 54 MMHG | WEIGHT: 189 LBS | TEMPERATURE: 98.3 F

## 2022-01-08 DIAGNOSIS — Z71.1 NO PROBLEM, FEARED COMPLAINT UNFOUNDED: Primary | ICD-10-CM

## 2022-01-08 PROCEDURE — 99282 EMERGENCY DEPT VISIT SF MDM: CPT

## 2022-01-08 PROCEDURE — 73552 X-RAY EXAM OF FEMUR 2/>: CPT

## 2022-01-08 NOTE — ED PROVIDER NOTES
"Subjective   Chief Complaint: Possible foreign body in left thigh  History of Present Illness: Patient states she was using an insulin syringe to give herself an injection in her left thigh earlier this morning and thinks the needle may have broken off.  She circled the area with a pen.  Upon further investigation this appears to be a syringe that has a retractable needle in this needle appears to be well-seated within the syringe surrounded by spring.  Onset: This morning  Timing: Single episode  Exacerbating / Alleviating factors: None  Associated symptoms: None      Nurses Notes reviewed and agree, including vitals, allergies, social history and prior medical history.          Review of Systems   Constitutional: Negative.    HENT: Negative.    Eyes: Negative.    Respiratory: Negative.    Cardiovascular: Negative.    Gastrointestinal: Negative.    Genitourinary: Negative.    Musculoskeletal: Negative.    Skin:        Possible needle in left thigh   Neurological: Negative.    Psychiatric/Behavioral: Negative.        Past Medical History:   Diagnosis Date   • Anesthesia 02/14/2020    Patient reported she is slow to wake after anesthesia   • Angina at rest ([facility])    • Anxiety    • Arthritis    • Asthma    • Body piercing     both ears   • Cataract, bilateral     s/p surgery   • Colon polyp     adenomatous polyps   • Depression    • Diabetes mellitus ([facility])    • Elevated cholesterol    • Frequent UTI    • Headache    • Heart murmur    • History of chest pain 02/14/2020    Patient reported \"they think it's nerves\" and reported last episode was 6-7 months ago   • Hyperlipidemia    • Hypertension    • Migraines    • Peptic ulceration    • RA (rheumatoid arthritis) ([facility])    • Skin cancer     face   • Type 2 diabetes mellitus ([facility])    • Urinary tract infection    • Wears dentures     full set   • Wears glasses        Allergies   Allergen Reactions   • Ceftin [Cefuroxime Axetil] Diarrhea and Nausea Only     MODERATE DIARRHEA "   • Quinapril Cough   • Codeine Rash   • Penicillins Rash       Past Surgical History:   Procedure Laterality Date   • ABDOMINAL HYSTERECTOMY  2007   • APPENDECTOMY     • COLONOSCOPY  2015?   • COLONOSCOPY N/A 2/17/2020    Procedure: COLONOSCOPY WITH COLD FORCEP POLYPECTOMY;  Surgeon: Belkis Peraza MD;  [facility]: [facility] ENDOSCOPY;  Service: Gastroenterology;  Laterality: N/A;   • COLONOSCOPY W/ POLYPECTOMY     • LAPAROSCOPIC CHOLECYSTECTOMY  2010   • MOUTH SURGERY      full extraction of teeth   • SALPINGO OOPHORECTOMY Bilateral 2009   • SKIN BIOPSY     • SPINAL FUSION  2002       Family History   Problem Relation Age of Onset   • Uterine cancer Mother         widely metastatic   • Breast cancer Mother    • Arthritis Mother    • Cancer Mother    • Heart attack Father    • Arthritis Father    • Hypertension Father    • Diabetes Other        Social History     Socioeconomic History   • Marital status:    Tobacco Use   • Smoking status: Current Every Day Smoker     Packs/day: 0.50     Years: 56.00     Pack years: 28.00     Types: Cigarettes   • Smokeless tobacco: Never Used   • Tobacco comment: smokes 1 cig a day    Vaping Use   • Vaping Use: Never used   Substance and Sexual Activity   • Alcohol use: Not Currently     Alcohol/week: 0.0 standard drinks     Comment: occa   • Drug use: No   • Sexual activity: Defer           Objective   Physical Exam  Vitals and nursing note reviewed.   Constitutional:       General: She is not in acute distress.     Appearance: Normal appearance. She is obese. She is not ill-appearing, toxic-appearing or diaphoretic.   HENT:      Head: Normocephalic and atraumatic.      Nose: Nose normal.   Eyes:      Extraocular Movements: Extraocular movements intact.   Cardiovascular:      Rate and Rhythm: Normal rate and regular rhythm.   Pulmonary:      Effort: Pulmonary effort is normal.   Abdominal:      General: Abdomen is flat.   Musculoskeletal:         General: Normal range of  motion.      Cervical back: Normal range of motion.   Skin:     General: Skin is warm and dry.   Neurological:      General: No focal deficit present.      Mental Status: She is alert.   Psychiatric:         Mood and Affect: Mood normal.         Behavior: Behavior normal.         Procedures           ED Course                                                 MDM  1530  Plain film reviewed with Dr. Chopra reveals no metallic foreign body consistent with a needle.  Further investigation of the Stelara syringe shows one that is a spring-loaded retractable needle with a needle safely within the syringe.  Patient has no shortness of breath, noted low oxygen reading but is COPD oxygen requiring patient.  Final diagnoses:   No problem, feared complaint unfounded       ED Disposition  ED Disposition     ED Disposition Condition Comment    Discharge Stable           Murtaza Elder MD  [address] [address] [phone]      As needed         Medication List      Changed    nitroglycerin 0.4 MG SL tablet  Commonly known as: NITROSTAT  1 under the tongue as needed for angina, may repeat q5mins for up three doses  What changed:   · how much to take  · how to take this  · when to take this  · reasons to take this             Pepe Najera PA-C  01/08/22 8726

## 2022-02-02 ENCOUNTER — APPOINTMENT (OUTPATIENT)
Dept: GENERAL RADIOLOGY | Facility: HOSPITAL | Age: 77
End: 2022-02-02

## 2022-02-02 ENCOUNTER — HOSPITAL ENCOUNTER (EMERGENCY)
Facility: HOSPITAL | Age: 77
Discharge: HOME | End: 2022-02-02
Attending: EMERGENCY MEDICINE | Admitting: EMERGENCY MEDICINE

## 2022-02-02 VITALS
WEIGHT: 189 LBS | HEART RATE: 93 BPM | HEIGHT: 64 IN | RESPIRATION RATE: 40 BRPM | DIASTOLIC BLOOD PRESSURE: 73 MMHG | SYSTOLIC BLOOD PRESSURE: 150 MMHG | TEMPERATURE: 103.2 F | BODY MASS INDEX: 32.27 KG/M2 | OXYGEN SATURATION: 95 %

## 2022-02-02 DIAGNOSIS — U07.1 COVID-19: Primary | ICD-10-CM

## 2022-02-02 LAB
ALBUMIN SERPL-MCNC: 4.1 G/DL (ref 3.5–5.2)
ALBUMIN/GLOB SERPL: 1.1 G/DL
ALP SERPL-CCNC: 78 U/L (ref 39–117)
ALT SERPL W P-5'-P-CCNC: 19 U/L (ref 1–33)
ANION GAP SERPL CALCULATED.3IONS-SCNC: 12.8 MMOL/L (ref 5–15)
AST SERPL-CCNC: 44 U/L (ref 1–32)
BASOPHILS # BLD AUTO: 0.11 10*3/MM3 (ref 0–0.2)
BASOPHILS NFR BLD AUTO: 0.9 % (ref 0–1.5)
BILIRUB SERPL-MCNC: 0.2 MG/DL (ref 0–1.2)
BUN SERPL-MCNC: 16 MG/DL (ref 8–23)
BUN/CREAT SERPL: 18.4 (ref 7–25)
CALCIUM SPEC-SCNC: 9.4 MG/DL (ref 8.6–10.5)
CHLORIDE SERPL-SCNC: 95 MMOL/L (ref 98–107)
CO2 SERPL-SCNC: 27.2 MMOL/L (ref 22–29)
CREAT SERPL-MCNC: 0.87 MG/DL (ref 0.57–1)
D-LACTATE SERPL-SCNC: 1.4 MMOL/L (ref 0.5–2)
DEPRECATED RDW RBC AUTO: 37.5 FL (ref 37–54)
EOSINOPHIL # BLD AUTO: 0.12 10*3/MM3 (ref 0–0.4)
EOSINOPHIL NFR BLD AUTO: 1 % (ref 0.3–6.2)
ERYTHROCYTE [DISTWIDTH] IN BLOOD BY AUTOMATED COUNT: 11.7 % (ref 12.3–15.4)
FLUAV RNA RESP QL NAA+PROBE: NOT DETECTED
FLUBV RNA RESP QL NAA+PROBE: NOT DETECTED
GFR SERPL CREATININE-BSD FRML MDRD: 63 ML/MIN/1.73
GLOBULIN UR ELPH-MCNC: 3.6 GM/DL
GLUCOSE SERPL-MCNC: 119 MG/DL (ref 65–99)
HCT VFR BLD AUTO: 41.9 % (ref 34–46.6)
HGB BLD-MCNC: 14.3 G/DL (ref 12–15.9)
HOLD SPECIMEN: NORMAL
HOLD SPECIMEN: NORMAL
IMM GRANULOCYTES # BLD AUTO: 0.04 10*3/MM3 (ref 0–0.05)
IMM GRANULOCYTES NFR BLD AUTO: 0.3 % (ref 0–0.5)
LYMPHOCYTES # BLD AUTO: 1.1 10*3/MM3 (ref 0.7–3.1)
LYMPHOCYTES NFR BLD AUTO: 9 % (ref 19.6–45.3)
MCH RBC QN AUTO: 30.2 PG (ref 26.6–33)
MCHC RBC AUTO-ENTMCNC: 34.1 G/DL (ref 31.5–35.7)
MCV RBC AUTO: 88.4 FL (ref 79–97)
MONOCYTES # BLD AUTO: 1.82 10*3/MM3 (ref 0.1–0.9)
MONOCYTES NFR BLD AUTO: 15 % (ref 5–12)
NEUTROPHILS NFR BLD AUTO: 73.8 % (ref 42.7–76)
NEUTROPHILS NFR BLD AUTO: 8.98 10*3/MM3 (ref 1.7–7)
NRBC BLD AUTO-RTO: 0 /100 WBC (ref 0–0.2)
NT-PROBNP SERPL-MCNC: 2243 PG/ML (ref 0–1800)
PLATELET # BLD AUTO: 307 10*3/MM3 (ref 140–450)
PMV BLD AUTO: 9 FL (ref 6–12)
POTASSIUM SERPL-SCNC: 3.7 MMOL/L (ref 3.5–5.2)
PROCALCITONIN SERPL-MCNC: 0.07 NG/ML (ref 0–0.25)
PROT SERPL-MCNC: 7.7 G/DL (ref 6–8.5)
RBC # BLD AUTO: 4.74 10*6/MM3 (ref 3.77–5.28)
SARS-COV-2 RNA RESP QL NAA+PROBE: DETECTED
SODIUM SERPL-SCNC: 135 MMOL/L (ref 136–145)
WBC NRBC COR # BLD: 12.17 10*3/MM3 (ref 3.4–10.8)
WHOLE BLOOD HOLD SPECIMEN: NORMAL
WHOLE BLOOD HOLD SPECIMEN: NORMAL

## 2022-02-02 PROCEDURE — 83880 ASSAY OF NATRIURETIC PEPTIDE: CPT | Performed by: EMERGENCY MEDICINE

## 2022-02-02 PROCEDURE — 83605 ASSAY OF LACTIC ACID: CPT | Performed by: EMERGENCY MEDICINE

## 2022-02-02 PROCEDURE — 71045 X-RAY EXAM CHEST 1 VIEW: CPT

## 2022-02-02 PROCEDURE — 87636 SARSCOV2 & INF A&B AMP PRB: CPT | Performed by: EMERGENCY MEDICINE

## 2022-02-02 PROCEDURE — 99284 EMERGENCY DEPT VISIT MOD MDM: CPT

## 2022-02-02 PROCEDURE — 80053 COMPREHEN METABOLIC PANEL: CPT | Performed by: EMERGENCY MEDICINE

## 2022-02-02 PROCEDURE — 84145 PROCALCITONIN (PCT): CPT | Performed by: EMERGENCY MEDICINE

## 2022-02-02 PROCEDURE — 85025 COMPLETE CBC W/AUTO DIFF WBC: CPT | Performed by: EMERGENCY MEDICINE

## 2022-02-02 PROCEDURE — 93005 ELECTROCARDIOGRAM TRACING: CPT | Performed by: EMERGENCY MEDICINE

## 2022-02-02 RX ORDER — SODIUM CHLORIDE 0.9 % (FLUSH) 0.9 %
10 SYRINGE (ML) INJECTION AS NEEDED
Status: DISCONTINUED | OUTPATIENT
Start: 2022-02-02 | End: 2022-02-02 | Stop reason: HOSPADM

## 2022-02-02 RX ORDER — ACETAMINOPHEN 325 MG/1
975 TABLET ORAL ONCE
Status: COMPLETED | OUTPATIENT
Start: 2022-02-02 | End: 2022-02-02

## 2022-02-02 RX ORDER — DEXAMETHASONE 6 MG/1
6 TABLET ORAL 2 TIMES DAILY WITH MEALS
Qty: 10 TABLET | Refills: 0 | Status: SHIPPED | OUTPATIENT
Start: 2022-02-02 | End: 2022-02-07

## 2022-02-02 RX ADMIN — ACETAMINOPHEN 975 MG: 325 TABLET ORAL at 06:13

## 2022-02-02 NOTE — ED PROVIDER NOTES
"Subjective   76-year-old female presents to the ED with a chief complaint of fatigue, generalized weakness and fever.  Patient symptoms have been ongoing for 2 to 3 days.  She states that she almost fell 2 weeks to get out of bed today.  Spent most of the day on the couch.  The patient complains of some shortness of breath and cough as well.  No abdominal pain.  No nausea or vomiting.  No chest pain.  Presents to the ED with a temp of 103.2 °F.  No other complaints this time.          Review of Systems   Constitutional: Positive for fatigue and fever.   Respiratory: Positive for cough and shortness of breath.    All other systems reviewed and are negative.      Past Medical History:   Diagnosis Date   • Anesthesia 02/14/2020    Patient reported she is slow to wake after anesthesia   • Angina at rest ([facility])    • Anxiety    • Arthritis    • Asthma    • Body piercing     both ears   • Cataract, bilateral     s/p surgery   • Colon polyp     adenomatous polyps   • COPD (chronic obstructive pulmonary disease) ([facility])    • Depression    • Diabetes mellitus ([facility])    • Elevated cholesterol    • Frequent UTI    • Headache    • Heart murmur    • History of chest pain 02/14/2020    Patient reported \"they think it's nerves\" and reported last episode was 6-7 months ago   • Hyperlipidemia    • Hypertension    • Migraines    • Peptic ulceration    • RA (rheumatoid arthritis) ([facility])    • Skin cancer     face   • Type 2 diabetes mellitus ([facility])    • Urinary tract infection    • Wears dentures     full set   • Wears glasses        Allergies   Allergen Reactions   • Ceftin [Cefuroxime Axetil] Diarrhea and Nausea Only     MODERATE DIARRHEA   • Quinapril Cough   • Codeine Rash   • Penicillins Rash       Past Surgical History:   Procedure Laterality Date   • ABDOMINAL HYSTERECTOMY  2007   • APPENDECTOMY     • COLONOSCOPY  2015?   • COLONOSCOPY N/A 2/17/2020    Procedure: COLONOSCOPY WITH COLD FORCEP POLYPECTOMY;  Surgeon: Belkis Peraza MD; "  Location: [facility] ENDOSCOPY;  Service: Gastroenterology;  Laterality: N/A;   • COLONOSCOPY W/ POLYPECTOMY     • LAPAROSCOPIC CHOLECYSTECTOMY  2010   • MOUTH SURGERY      full extraction of teeth   • SALPINGO OOPHORECTOMY Bilateral 2009   • SKIN BIOPSY     • SPINAL FUSION  2002       Family History   Problem Relation Age of Onset   • Uterine cancer Mother         widely metastatic   • Breast cancer Mother    • Arthritis Mother    • Cancer Mother    • Heart attack Father    • Arthritis Father    • Hypertension Father    • Diabetes Other        Social History     Socioeconomic History   • Marital status:    Tobacco Use   • Smoking status: Former Smoker     Packs/day: 0.50     Years: 56.00     Pack years: 28.00     Types: Cigarettes   • Smokeless tobacco: Never Used   • Tobacco comment: smokes 1 cig a day    Vaping Use   • Vaping Use: Never used   Substance and Sexual Activity   • Alcohol use: Not Currently     Alcohol/week: 0.0 standard drinks     Comment: occa   • Drug use: No   • Sexual activity: Defer           Objective   Physical Exam  Vitals and nursing note reviewed.   Constitutional:       General: She is not in acute distress.     Appearance: She is well-developed. She is not diaphoretic.   HENT:      Head: Normocephalic and atraumatic.      Nose: Nose normal.   Eyes:      Conjunctiva/sclera: Conjunctivae normal.   Cardiovascular:      Rate and Rhythm: Normal rate and regular rhythm.   Pulmonary:      Effort: No respiratory distress.      Comments: Tachypnea.  Coarse breath sounds bilaterally.  Abdominal:      General: There is no distension.      Palpations: Abdomen is soft.      Tenderness: There is no abdominal tenderness. There is no guarding.   Musculoskeletal:         General: No deformity.   Neurological:      Mental Status: She is alert and oriented to person, place, and time.      Cranial Nerves: No cranial nerve deficit.         Procedures           ED Course  ED Course as of 02/07/22 1748    Wed Feb 02, 2022   0613 EKG interpreted by me.  Sinus rhythm.  Rate of 83.  Left bundle branch block.  No obvious ST or T wave abnormalities.  Abnormal EKG [CG]      ED Course User Index  [CG] Tej Umanzor, DO                                                 MDM  76-year-old female presents to the ED with chief complaint of fever, generalized weakness and fatigue.  Labs are reassuring.  Pulse ox appropriate on her home O2 requirements.  Temp of 102.3 °F on arrival which improved status post antipyretics.  Patient is Covid positive.  Further labs were without significant acute abnormality.  Will place on some dexamethasone.  Strict return precautions given.  Patient is appropriate for discharge given normal pulse ox based on home O2 requirements.    Evaluation, management, and disposition decisions were made in the context of the current coronavirus/COVID 19 pandemic.  In this patient, the increased risk of nosocomial infection is of particular concern.  In my judgment, and with shared decision-making with the patient, the balance of clinical factors dictate expedited evaluation and discharge from the ED in the interest of this patient's health and safety.    Final diagnoses:   COVID-19       ED Disposition  ED Disposition     ED Disposition Condition Comment    Discharge Stable           Murtaza Elder MD  [address] [address] [phone]               Medication List      New Prescriptions    dexamethasone 6 MG tablet  Commonly known as: DECADRON  Take 1 tablet by mouth 2 (Two) Times a Day With Meals for 5 days.        Changed    nitroglycerin 0.4 MG SL tablet  Commonly known as: NITROSTAT  1 under the tongue as needed for angina, may repeat q5mins for up three doses  What changed:   · how much to take  · how to take this  · when to take this  · reasons to take this           Where to Get Your Medications      These medications were sent to [facility]/pharmacy #[id number] - [place], [place] -  [address] [facility] [address] [phone]  - [phone]  [address] [facility] [address] [address]    Phone: [phone]   · dexamethasone 6 MG tablet          Tej Umanzor DO  02/07/22 0822

## 2022-04-15 ENCOUNTER — OFFICE VISIT (OUTPATIENT)
Dept: PULMONOLOGY | Facility: CLINIC | Age: 77
End: 2022-04-15

## 2022-04-15 VITALS
SYSTOLIC BLOOD PRESSURE: 138 MMHG | TEMPERATURE: 97.3 F | DIASTOLIC BLOOD PRESSURE: 78 MMHG | HEART RATE: 70 BPM | BODY MASS INDEX: 34.5 KG/M2 | WEIGHT: 201 LBS | OXYGEN SATURATION: 98 % | RESPIRATION RATE: 12 BRPM

## 2022-04-15 DIAGNOSIS — J43.2 CENTRILOBULAR EMPHYSEMA: ICD-10-CM

## 2022-04-15 DIAGNOSIS — J96.11 CHRONIC RESPIRATORY FAILURE WITH HYPOXIA: ICD-10-CM

## 2022-04-15 DIAGNOSIS — J44.9 STAGE 3 SEVERE COPD BY GOLD CLASSIFICATION: Primary | ICD-10-CM

## 2022-04-15 DIAGNOSIS — Z72.0 TOBACCO USE: ICD-10-CM

## 2022-04-15 DIAGNOSIS — Z53.20 LUNG CANCER SCREENING DECLINED BY PATIENT: ICD-10-CM

## 2022-04-15 DIAGNOSIS — I51.89 DIASTOLIC DYSFUNCTION: ICD-10-CM

## 2022-04-15 PROCEDURE — 99214 OFFICE O/P EST MOD 30 MIN: CPT | Performed by: NURSE PRACTITIONER

## 2022-04-15 RX ORDER — FUROSEMIDE 20 MG/1
TABLET ORAL
Qty: 7 TABLET | Refills: 0 | Status: SHIPPED | OUTPATIENT
Start: 2022-04-15

## 2022-04-15 RX ORDER — LOSARTAN POTASSIUM 100 MG/1
TABLET ORAL
COMMUNITY
Start: 2022-03-02

## 2022-04-15 NOTE — PROGRESS NOTES
"     Follow Up Office Visit      Patient Name: Katharine Fitzpatrick    Chief Complaint:    Chief Complaint   Patient presents with   • Shortness of Breath     followup       History of Present Illness: Katharine Fitzpatrick is a 77 y.o. female who is here today for follow up of COPD. Since last clinic visit, she was seen in the ER in February of this year for covid-19, at which time she was prescribed dexamethasone. Her symptoms have since returned to baseline. She is still smoking. She continues on Trelegy. Declines to undergo LDCT chest screening. swelling in ankles past few months    Duration: COPD diagnosed in 2020   Severity: Severe COPD  Timing: Daily  Associated Symptoms: Chronic cough with intermittent production of clear mucus, +intermittent wheezing though none currently, no fevers, no hemoptysis, + ankle swelling over the past few months.  Supplemental Oxygen: 2L NC  Last Steroids: February 2022    Subjective      Review of Systems:  Review of Systems   Constitutional: Negative for fever and unexpected weight change.   Respiratory: Positive for cough and shortness of breath. Negative for wheezing.    Cardiovascular: Positive for leg swelling. Negative for chest pain.      Past Medical History:   Past Medical History:   Diagnosis Date   • Anesthesia 02/14/2020    Patient reported she is slow to wake after anesthesia   • Angina at rest ([facility])    • Anxiety    • Arthritis    • Asthma    • Body piercing     both ears   • Cataract, bilateral     s/p surgery   • Colon polyp     adenomatous polyps   • COPD (chronic obstructive pulmonary disease) ([facility])    • Depression    • Diabetes mellitus ([facility])    • Elevated cholesterol    • Frequent UTI    • Headache    • Heart murmur    • History of chest pain 02/14/2020    Patient reported \"they think it's nerves\" and reported last episode was 6-7 months ago   • Hyperlipidemia    • Hypertension    • Migraines    • Peptic ulceration    • RA (rheumatoid arthritis) ([facility])    • Skin cancer     " face   • Type 2 diabetes mellitus (HCC)    • Urinary tract infection    • Wears dentures     full set   • Wears glasses        Past Surgical History:   Past Surgical History:   Procedure Laterality Date   • ABDOMINAL HYSTERECTOMY  2007   • APPENDECTOMY     • COLONOSCOPY  2015?   • COLONOSCOPY N/A 2/17/2020    Procedure: COLONOSCOPY WITH COLD FORCEP POLYPECTOMY;  Surgeon: Belkis Peraza MD;  Location: [facility] ENDOSCOPY;  Service: Gastroenterology;  Laterality: N/A;   • COLONOSCOPY W/ POLYPECTOMY     • LAPAROSCOPIC CHOLECYSTECTOMY  2010   • MOUTH SURGERY      full extraction of teeth   • SALPINGO OOPHORECTOMY Bilateral 2009   • SKIN BIOPSY     • SPINAL FUSION  2002       Family History:   Family History   Problem Relation Age of Onset   • Uterine cancer Mother         widely metastatic   • Breast cancer Mother    • Arthritis Mother    • Cancer Mother    • Heart attack Father    • Arthritis Father    • Hypertension Father    • Diabetes Other        Social History:   Social History     Socioeconomic History   • Marital status:    Tobacco Use   • Smoking status: Former Smoker     Packs/day: 0.50     Years: 56.00     Pack years: 28.00     Types: Cigarettes   • Smokeless tobacco: Never Used   • Tobacco comment: smokes 1 cig a day    Vaping Use   • Vaping Use: Never used   Substance and Sexual Activity   • Alcohol use: Not Currently     Alcohol/week: 0.0 standard drinks     Comment: occa   • Drug use: No   • Sexual activity: Defer       Current Medications:     Current Outpatient Medications:   •  Accu-Chek Guide test strip, , Disp: , Rfl:   •  albuterol sulfate  (90 Base) MCG/ACT inhaler, Inhale 2 puffs Every 4 (Four) Hours As Needed for Wheezing., Disp: 6.7 g, Rfl: 5  •  amLODIPine (NORVASC) 5 MG tablet, Take 5 mg by mouth Daily., Disp: , Rfl:   •  ARIPiprazole (ABILIFY) 5 MG tablet, aripiprazole 5 mg tablet, Disp: , Rfl:   •  atorvastatin (LIPITOR) 20 MG tablet, atorvastatin 20 mg tablet  TAKE 1 TABLET BY  MOUTH EVERY DAY, Disp: , Rfl:   •  clonazePAM (KlonoPIN) 1 MG tablet, Take 1 mg by mouth 3 (Three) Times a Day As Needed., Disp: , Rfl:   •  fenofibrate 160 MG tablet, Take 160 mg by mouth Daily., Disp: , Rfl:   •  HYDROcodone-acetaminophen (NORCO) 7.5-325 MG per tablet, Take 1 tablet by mouth Daily., Disp: , Rfl:   •  insulin aspart (novoLOG FLEXPEN) 100 UNIT/ML solution pen-injector sc pen, For use at mealtimes and per correctional scale, up to 30 units daily, Disp: 9 mL, Rfl: 5  •  insulin aspart prot & aspart (NovoLOG Mix 70/30 FlexPen) (70-30) 100 UNIT/ML suspension pen-injector injection, Inject 12 Units under the skin into the appropriate area as directed., Disp: , Rfl:   •  Insulin Glargine (BASAGLAR KWIKPEN) 100 UNIT/ML injection pen, Inject 15 Units under the skin into the appropriate area as directed Daily., Disp: 6 mL, Rfl: 5  •  ipratropium-albuterol (DUO-NEB) 0.5-2.5 mg/3 ml nebulizer, USE 1 AMPULES IN NEBULIZER FOUR TIMES A DAY AS DIRECTED, Disp: , Rfl:   •  LORazepam (ATIVAN) 1 MG tablet, Take 1 mg by mouth Every 8 (Eight) Hours As Needed for Anxiety., Disp: , Rfl:   •  losartan (COZAAR) 50 MG tablet, Take 1 tablet by mouth Daily., Disp: 90 tablet, Rfl: 3  •  methocarbamol (ROBAXIN) 500 MG tablet, Take 1,000 mg by mouth 2 (two) times a day. Patient takes 2 tablets 2 times a day., Disp: , Rfl:   •  methotrexate 2.5 MG tablet, , Disp: , Rfl:   •  metoprolol succinate XL (TOPROL-XL) 50 MG 24 hr tablet, Take 1 tablet by mouth 2 (Two) Times a Day., Disp: , Rfl:   •  nitroglycerin (NITROSTAT) 0.4 MG SL tablet, 1 under the tongue as needed for angina, may repeat q5mins for up three doses (Patient taking differently: Place 0.4 mg under the tongue Every 5 (Five) Minutes As Needed (pt states has never taken this). 1 under the tongue as needed for angina, may repeat q5mins for up three doses), Disp: 100 tablet, Rfl: 11  •  oxyCODONE (ROXICODONE) 5 MG immediate release tablet, , Disp: , Rfl:   •  rosuvastatin  (CRESTOR) 10 MG tablet, Take 10 mg by mouth Daily., Disp: , Rfl:   •  SUMAtriptan (IMITREX) 50 MG tablet, Take 50 mg by mouth As Needed., Disp: , Rfl:   •  Trelegy Ellipta 100-62.5-25 MCG/INH inhaler, Inhale 1 puff Daily. Rinse mouth out after use, Disp: 3 each, Rfl: 3  •  venlafaxine XR (EFFEXOR-XR) 150 MG 24 hr capsule, TAKE 1 CAPSULE TWICE DAILY (Patient taking differently: Take 150 mg by mouth 2 (Two) Times a Day.), Disp: 180 capsule, Rfl: 3  •  Diclofenac Sodium (VOLTAREN) 1 % gel gel, , Disp: , Rfl:   •  folic acid (FOLVITE) 1 MG tablet, , Disp: , Rfl:   •  furosemide (Lasix) 20 MG tablet, Take 1 tablet daily as needed for swelling, Disp: 7 tablet, Rfl: 0  •  isosorbide mononitrate (IMDUR) 30 MG 24 hr tablet, Take 30 mg by mouth., Disp: , Rfl:   •  losartan (COZAAR) 100 MG tablet, , Disp: , Rfl:   •  nitrofurantoin (MACRODANTIN) 100 MG capsule, Take 100 mg by mouth Every Night., Disp: , Rfl:   •  triamcinolone (KENALOG) 0.1 % cream, Apply 1 application topically to the appropriate area as directed Daily., Disp: , Rfl:      Allergies:   Allergies   Allergen Reactions   • Ceftin [Cefuroxime Axetil] Diarrhea and Nausea Only     MODERATE DIARRHEA   • Quinapril Cough   • Codeine Rash   • Penicillins Rash       Objective     Physical Exam:  Vital Signs:   Vitals:    04/15/22 1111 04/15/22 1123   BP: 138/78    BP Location: Left arm    Patient Position: Sitting    Cuff Size: Adult    Pulse: 70    Resp: 12    Temp: 97.3 °F (36.3 °C)    SpO2: (!) 88% on room air 98% on 2L NC   Weight: 91.2 kg (201 lb)      Body mass index is 34.5 kg/m².    Physical Exam  Constitutional:       General: She is not in acute distress.     Appearance: She is not toxic-appearing.   HENT:      Head: Normocephalic and atraumatic.   Eyes:      Extraocular Movements: Extraocular movements intact.   Cardiovascular:      Rate and Rhythm: Normal rate and regular rhythm.      Heart sounds: Normal heart sounds.   Pulmonary:      Effort: Pulmonary  effort is normal.      Breath sounds: Normal breath sounds.   Abdominal:      General: There is no distension.   Musculoskeletal:      Cervical back: Neck supple.   Skin:     General: Skin is warm and dry.      Findings: No rash.   Neurological:      General: No focal deficit present.      Mental Status: She is alert and oriented to person, place, and time.   Psychiatric:         Mood and Affect: Mood normal.         Behavior: Behavior normal.       Results Review:   WBC   Date Value Ref Range Status   02/02/2022 12.17 (H) 3.40 - 10.80 10*3/mm3 Final     RBC   Date Value Ref Range Status   02/02/2022 4.74 3.77 - 5.28 10*6/mm3 Final     Hemoglobin   Date Value Ref Range Status   02/02/2022 14.3 12.0 - 15.9 g/dL Final     Hematocrit   Date Value Ref Range Status   02/02/2022 41.9 34.0 - 46.6 % Final     MCV   Date Value Ref Range Status   02/02/2022 88.4 79.0 - 97.0 fL Final     MCH   Date Value Ref Range Status   02/02/2022 30.2 26.6 - 33.0 pg Final     MCHC   Date Value Ref Range Status   02/02/2022 34.1 31.5 - 35.7 g/dL Final     RDW   Date Value Ref Range Status   02/02/2022 11.7 (L) 12.3 - 15.4 % Final     RDW-SD   Date Value Ref Range Status   02/02/2022 37.5 37.0 - 54.0 fl Final     MPV   Date Value Ref Range Status   02/02/2022 9.0 6.0 - 12.0 fL Final     Platelets   Date Value Ref Range Status   02/02/2022 307 140 - 450 10*3/mm3 Final     Neutrophil %   Date Value Ref Range Status   02/02/2022 73.8 42.7 - 76.0 % Final     Lymphocyte %   Date Value Ref Range Status   02/02/2022 9.0 (L) 19.6 - 45.3 % Final     Monocyte %   Date Value Ref Range Status   02/02/2022 15.0 (H) 5.0 - 12.0 % Final     Eosinophil %   Date Value Ref Range Status   02/02/2022 1.0 0.3 - 6.2 % Final     Basophil %   Date Value Ref Range Status   02/02/2022 0.9 0.0 - 1.5 % Final     Immature Grans %   Date Value Ref Range Status   02/02/2022 0.3 0.0 - 0.5 % Final     Neutrophils, Absolute   Date Value Ref Range Status   02/02/2022 8.98 (H)  1.70 - 7.00 10*3/mm3 Final     Lymphocytes, Absolute   Date Value Ref Range Status   02/02/2022 1.10 0.70 - 3.10 10*3/mm3 Final     Monocytes, Absolute   Date Value Ref Range Status   02/02/2022 1.82 (H) 0.10 - 0.90 10*3/mm3 Final     Eosinophils, Absolute   Date Value Ref Range Status   02/02/2022 0.12 0.00 - 0.40 10*3/mm3 Final     Basophils, Absolute   Date Value Ref Range Status   02/02/2022 0.11 0.00 - 0.20 10*3/mm3 Final     Immature Grans, Absolute   Date Value Ref Range Status   02/02/2022 0.04 0.00 - 0.05 10*3/mm3 Final     nRBC   Date Value Ref Range Status   02/02/2022 0.0 0.0 - 0.2 /100 WBC Final     Lab Results   Component Value Date    GLUCOSE 119 (H) 02/02/2022    BUN 16 02/02/2022    CREATININE 0.87 02/02/2022    EGFRIFNONA 63 02/02/2022    BCR 18.4 02/02/2022    K 3.7 02/02/2022    CO2 27.2 02/02/2022    CALCIUM 9.4 02/02/2022    ALBUMIN 4.10 02/02/2022    AST 44 (H) 02/02/2022    ALT 19 02/02/2022     Results for orders placed in visit on 12/23/20    Adult Transthoracic Echo Complete W/ Cont if Necessary Per Protocol    Interpretation Summary  · Left ventricular wall thickness is consistent with borderline concentric hypertrophy.  · Left ventricular diastolic function is consistent with (grade I) impaired relaxation.  · Estimated left ventricular EF = 66% Left ventricular ejection fraction appears to be 66 - 70%. Left ventricular systolic function is normal.  · Estimated right ventricular systolic pressure from tricuspid regurgitation is mildly elevated (35-45 mmHg).  · Mild pulmonary hypertension is present.    February 2022 chest x-ray showed pulmonary venous hypertension.  No focal opacities or effusions.    Assessment / Plan      Assessment/Plan:   Diagnoses and all orders for this visit:    1. Stage 3 severe COPD by GOLD classification ([facility]) (Primary)  -     Trelegy Ellipta 100-62.5-25 MCG/INH inhaler; Inhale 1 puff Daily. Rinse mouth out after use  Dispense: 3 each; Refill: 3  Symptoms are  currently at baseline.  Continue current inhaled regimen. We discussed the risk and benefits of inhaled corticosteroids. Patient instructed to take them on a regular basis as prescribed. Patient instructed to rinse their mouth out after each use.     2. Chronic respiratory failure with hypoxia (HCC)  Patient continues to require supplemental oxygen    3. Centrilobular emphysema (HCC)  -     Trelegy Ellipta 100-62.5-25 MCG/INH inhaler; Inhale 1 puff Daily. Rinse mouth out after use  Dispense: 3 each; Refill: 3    4. Diastolic dysfunction  -     furosemide (Lasix) 20 MG tablet; Take 1 tablet daily as needed for swelling  Dispense: 7 tablet; Refill: 0  Discussed possible side effects of medications. Advised patient to eat potassium-rich foods during use. Risk and benefits of the medication were discussed with patient.     5.  Tobacco use  Complete cessation advised.  The patient was educated on the risk of fire/explosion and associated harm to self or others if open flame, including that from cigarettes if near a supplemental oxygen source. Patient voiced understanding.     6.  Lung cancer screening declined by patient  Patient with > 30 pack year smoking history, and therefore low dose lung cancer screening is recommended. Shared decision making counseling included risks and benefits of low does lung cancer screening, follow up diagnostic testing that may be indicated and how comorbid conditions would effect diagnosis and treatment, false positive rates and total radiation exposure. Patient declines to undergo screening at this time.     Follow Up:   Return in about 6 months (around 10/15/2022) for Recheck.  The patient was counseled on diagnostic results, risks and benefits of treatment options, risk factor modifications and the importance of treatment compliance. The patient was advised to contact the clinic with concerns or worsening symptoms.     EUNICE Day   Pulmonary Medicine [place]     Please note  that portions of this note may have been completed with a voice recognition program. Efforts were made to edit the dictations, but occasionally words are mistranscribed

## 2022-05-03 ENCOUNTER — TELEPHONE (OUTPATIENT)
Dept: OBSTETRICS AND GYNECOLOGY | Facility: CLINIC | Age: 77
End: 2022-05-03

## 2022-06-03 RX ORDER — NYSTATIN AND TRIAMCINOLONE ACETONIDE 100000; 1 [USP'U]/G; MG/G
1 OINTMENT TOPICAL 2 TIMES DAILY
Qty: 60 G | Refills: 0 | Status: SHIPPED | OUTPATIENT
Start: 2022-06-03

## 2022-06-13 ENCOUNTER — TRANSCRIBE ORDERS (OUTPATIENT)
Dept: LAB | Facility: HOSPITAL | Age: 77
End: 2022-06-13

## 2022-06-13 ENCOUNTER — LAB (OUTPATIENT)
Dept: LAB | Facility: HOSPITAL | Age: 77
End: 2022-06-13

## 2022-06-13 DIAGNOSIS — L40.50 PSORIATIC ARTHRITIS: ICD-10-CM

## 2022-06-13 DIAGNOSIS — L40.8 INVERSE PSORIASIS: ICD-10-CM

## 2022-06-13 DIAGNOSIS — L40.8 INVERSE PSORIASIS: Primary | ICD-10-CM

## 2022-06-13 DIAGNOSIS — L40.0 PSORIASIS VULGARIS: ICD-10-CM

## 2022-06-13 LAB
BASOPHILS # BLD AUTO: 0.14 10*3/MM3 (ref 0–0.2)
BASOPHILS NFR BLD AUTO: 1.6 % (ref 0–1.5)
DEPRECATED RDW RBC AUTO: 39.2 FL (ref 37–54)
EOSINOPHIL # BLD AUTO: 0.31 10*3/MM3 (ref 0–0.4)
EOSINOPHIL NFR BLD AUTO: 3.5 % (ref 0.3–6.2)
ERYTHROCYTE [DISTWIDTH] IN BLOOD BY AUTOMATED COUNT: 12 % (ref 12.3–15.4)
HCT VFR BLD AUTO: 36 % (ref 34–46.6)
HGB BLD-MCNC: 12.3 G/DL (ref 12–15.9)
IMM GRANULOCYTES # BLD AUTO: 0.03 10*3/MM3 (ref 0–0.05)
IMM GRANULOCYTES NFR BLD AUTO: 0.3 % (ref 0–0.5)
LYMPHOCYTES # BLD AUTO: 1.9 10*3/MM3 (ref 0.7–3.1)
LYMPHOCYTES NFR BLD AUTO: 21.7 % (ref 19.6–45.3)
MCH RBC QN AUTO: 30.1 PG (ref 26.6–33)
MCHC RBC AUTO-ENTMCNC: 34.2 G/DL (ref 31.5–35.7)
MCV RBC AUTO: 88.2 FL (ref 79–97)
MONOCYTES # BLD AUTO: 0.68 10*3/MM3 (ref 0.1–0.9)
MONOCYTES NFR BLD AUTO: 7.8 % (ref 5–12)
NEUTROPHILS NFR BLD AUTO: 5.71 10*3/MM3 (ref 1.7–7)
NEUTROPHILS NFR BLD AUTO: 65.1 % (ref 42.7–76)
NRBC BLD AUTO-RTO: 0 /100 WBC (ref 0–0.2)
PLATELET # BLD AUTO: 299 10*3/MM3 (ref 140–450)
PMV BLD AUTO: 9.3 FL (ref 6–12)
RBC # BLD AUTO: 4.08 10*6/MM3 (ref 3.77–5.28)
WBC NRBC COR # BLD: 8.77 10*3/MM3 (ref 3.4–10.8)

## 2022-06-13 PROCEDURE — 86480 TB TEST CELL IMMUN MEASURE: CPT

## 2022-06-13 PROCEDURE — 80053 COMPREHEN METABOLIC PANEL: CPT

## 2022-06-13 PROCEDURE — 85025 COMPLETE CBC W/AUTO DIFF WBC: CPT

## 2022-06-13 PROCEDURE — 36415 COLL VENOUS BLD VENIPUNCTURE: CPT

## 2022-06-14 LAB
ALBUMIN SERPL-MCNC: 3.4 G/DL (ref 3.5–5.2)
ALBUMIN/GLOB SERPL: 1.1 G/DL
ALP SERPL-CCNC: 63 U/L (ref 39–117)
ALT SERPL W P-5'-P-CCNC: 18 U/L (ref 1–33)
ANION GAP SERPL CALCULATED.3IONS-SCNC: 11.5 MMOL/L (ref 5–15)
AST SERPL-CCNC: 25 U/L (ref 1–32)
BILIRUB SERPL-MCNC: 0.4 MG/DL (ref 0–1.2)
BUN SERPL-MCNC: 18 MG/DL (ref 8–23)
BUN/CREAT SERPL: 16.8 (ref 7–25)
CALCIUM SPEC-SCNC: 8.5 MG/DL (ref 8.6–10.5)
CHLORIDE SERPL-SCNC: 95 MMOL/L (ref 98–107)
CO2 SERPL-SCNC: 27.5 MMOL/L (ref 22–29)
CREAT SERPL-MCNC: 1.07 MG/DL (ref 0.57–1)
EGFRCR SERPLBLD CKD-EPI 2021: 53.6 ML/MIN/1.73
GLOBULIN UR ELPH-MCNC: 3 GM/DL
GLUCOSE SERPL-MCNC: 297 MG/DL (ref 65–99)
POTASSIUM SERPL-SCNC: 4.1 MMOL/L (ref 3.5–5.2)
PROT SERPL-MCNC: 6.4 G/DL (ref 6–8.5)
SODIUM SERPL-SCNC: 134 MMOL/L (ref 136–145)

## 2022-06-15 LAB
GAMMA INTERFERON BACKGROUND BLD IA-ACNC: 0.01 IU/ML
M TB IFN-G BLD-IMP: NEGATIVE
M TB IFN-G CD4+ BCKGRND COR BLD-ACNC: 0.09 IU/ML
M TB IFN-G CD4+CD8+ BCKGRND COR BLD-ACNC: 0.06 IU/ML
MITOGEN IGNF BLD-ACNC: >10 IU/ML
QUANTIFERON INCUBATION: NORMAL
SERVICE CMNT-IMP: NORMAL

## 2022-06-21 ENCOUNTER — OFFICE VISIT (OUTPATIENT)
Dept: OBSTETRICS AND GYNECOLOGY | Facility: CLINIC | Age: 77
End: 2022-06-21

## 2022-06-21 VITALS
WEIGHT: 202 LBS | SYSTOLIC BLOOD PRESSURE: 154 MMHG | BODY MASS INDEX: 34.49 KG/M2 | DIASTOLIC BLOOD PRESSURE: 82 MMHG | HEIGHT: 64 IN

## 2022-06-21 DIAGNOSIS — Z01.411 ENCOUNTER FOR GYNECOLOGICAL EXAMINATION WITH ABNORMAL FINDING: Primary | ICD-10-CM

## 2022-06-21 DIAGNOSIS — Z12.31 ENCOUNTER FOR SCREENING MAMMOGRAM FOR BREAST CANCER: ICD-10-CM

## 2022-06-21 DIAGNOSIS — L40.9 PSORIASIS: ICD-10-CM

## 2022-06-21 DIAGNOSIS — B37.2 YEAST DERMATITIS: ICD-10-CM

## 2022-06-21 DIAGNOSIS — N95.2 POSTMENOPAUSAL ATROPHIC VAGINITIS: ICD-10-CM

## 2022-06-21 PROCEDURE — 3015F CERV CANCER SCREEN DOCD: CPT | Performed by: OBSTETRICS & GYNECOLOGY

## 2022-06-21 PROCEDURE — G0101 CA SCREEN;PELVIC/BREAST EXAM: HCPCS | Performed by: OBSTETRICS & GYNECOLOGY

## 2022-06-21 PROCEDURE — 99204 OFFICE O/P NEW MOD 45 MIN: CPT | Performed by: OBSTETRICS & GYNECOLOGY

## 2022-06-21 RX ORDER — SUMATRIPTAN 50 MG/1
1 TABLET, FILM COATED ORAL
COMMUNITY

## 2022-06-21 RX ORDER — POTASSIUM CHLORIDE 750 MG/1
10 TABLET, FILM COATED, EXTENDED RELEASE ORAL DAILY
COMMUNITY
Start: 2022-04-15

## 2022-06-21 RX ORDER — IBUPROFEN 200 MG
1 TABLET ORAL EVERY 6 HOURS
COMMUNITY

## 2022-06-21 RX ORDER — AMITRIPTYLINE HYDROCHLORIDE 10 MG/1
TABLET, FILM COATED ORAL
COMMUNITY
Start: 2022-06-06

## 2022-06-21 RX ORDER — CLONAZEPAM 1 MG/1
1 TABLET ORAL EVERY 8 HOURS
COMMUNITY

## 2022-06-21 RX ORDER — METHOCARBAMOL 500 MG/1
2 TABLET, FILM COATED ORAL EVERY 6 HOURS
COMMUNITY

## 2022-06-21 RX ORDER — OMEPRAZOLE 20 MG/1
TABLET, DELAYED RELEASE ORAL
COMMUNITY

## 2022-06-21 RX ORDER — FLUOCINONIDE 0.5 MG/G
OINTMENT TOPICAL
COMMUNITY
Start: 2022-06-08

## 2022-06-21 RX ORDER — MELOXICAM 15 MG/1
TABLET ORAL
COMMUNITY
Start: 2022-05-18

## 2022-06-21 RX ORDER — METOPROLOL TARTRATE 50 MG/1
1 TABLET, FILM COATED ORAL EVERY 12 HOURS
COMMUNITY

## 2022-06-21 RX ORDER — LOSARTAN POTASSIUM 50 MG/1
1 TABLET ORAL DAILY
COMMUNITY

## 2022-06-21 RX ORDER — ISOSORBIDE DINITRATE 30 MG/1
TABLET ORAL
COMMUNITY
Start: 2022-05-30

## 2022-06-21 RX ORDER — ONDANSETRON 4 MG/1
4 TABLET, FILM COATED ORAL DAILY PRN
COMMUNITY
Start: 2022-06-03

## 2022-06-21 RX ORDER — ATORVASTATIN CALCIUM 20 MG/1
1 TABLET, FILM COATED ORAL DAILY
COMMUNITY

## 2022-06-21 RX ORDER — AMLODIPINE BESYLATE 5 MG/1
1 TABLET ORAL DAILY
COMMUNITY

## 2022-06-21 RX ORDER — FLUTICASONE PROPIONATE AND SALMETEROL 250; 50 UG/1; UG/1
1 POWDER RESPIRATORY (INHALATION) EVERY 12 HOURS
COMMUNITY

## 2022-06-21 RX ORDER — FENOFIBRATE 160 MG/1
1 TABLET ORAL DAILY
COMMUNITY

## 2022-06-22 RX ORDER — ESTRADIOL 0.1 MG/G
CREAM VAGINAL
Qty: 1 EACH | Refills: 11 | Status: SHIPPED | OUTPATIENT
Start: 2022-06-22

## 2022-06-22 RX ORDER — CLOTRIMAZOLE AND BETAMETHASONE DIPROPIONATE 10; .64 MG/G; MG/G
1 CREAM TOPICAL 2 TIMES DAILY
Qty: 45 G | Refills: 0 | Status: SHIPPED | OUTPATIENT
Start: 2022-06-22

## 2022-06-22 NOTE — PROGRESS NOTES
"Chief Complaint  Gynecologic Exam     History of Present Illness:  Patient is 77 y.o.  who presents to [facility] here as a new patient for her annual examination.  Patient reports it has been many years since she has had a Pap smear.  Patient also reports it has been many years since she has had a mammogram.  Patient has had a hysterectomy 15 years ago.  She reports benign pathology.  Patient sees Dr. Elder for her primary care.  Patient does report also being followed by dermatologist for her psoriasis.  Patient does have an appointment with a rheumatologist as well.  Patient does report having vaginal dryness and irritation.  Patient also reports having vulvar itching and irritation.  Patient also reports having itching and irritation beneath her pannus as well as bilateral breasts.  Patient is a diabetic.  Patient is not on any hormone replacement therapy or estrogen cream.    History  Past Medical History:   Diagnosis Date   • Anesthesia 2020    Patient reported she is slow to wake after anesthesia   • Angina at rest ([facility])    • Anxiety    • Arthritis    • Asthma    • Body piercing     both ears   • Cataract, bilateral     s/p surgery   • Colon polyp     adenomatous polyps   • COPD (chronic obstructive pulmonary disease) ([facility])    • Depression    • Diabetes mellitus ([facility])    • Elevated cholesterol    • Frequent UTI    • Headache    • Heart murmur    • History of chest pain 2020    Patient reported \"they think it's nerves\" and reported last episode was 6-7 months ago   • Hyperlipidemia    • Hypertension    • Migraines    • Peptic ulceration    • RA (rheumatoid arthritis) ([facility])    • Skin cancer     face   • Type 2 diabetes mellitus ([facility])    • Urinary tract infection    • Wears dentures     full set   • Wears glasses      Current Outpatient Medications on File Prior to Visit   Medication Sig Dispense Refill   • Accu-Chek Guide test strip      • albuterol sulfate HFA " 108 (90 Base) MCG/ACT inhaler Inhale 2 puffs Every 4 (Four) Hours As Needed for Wheezing. 6.7 g 5   • amitriptyline (ELAVIL) 10 MG tablet      • amLODIPine (NORVASC) 5 MG tablet Take 5 mg by mouth Daily.     • amLODIPine (NORVASC) 5 MG tablet Take 1 tablet by mouth Daily.     • ARIPiprazole (ABILIFY) 5 MG tablet aripiprazole 5 mg tablet     • atorvastatin (LIPITOR) 20 MG tablet atorvastatin 20 mg tablet   TAKE 1 TABLET BY MOUTH EVERY DAY     • atorvastatin (LIPITOR) 20 MG tablet Take 1 tablet by mouth Daily.     • clonazePAM (KlonoPIN) 1 MG tablet Take 1 mg by mouth 3 (Three) Times a Day As Needed.     • clonazePAM (KlonoPIN) 1 MG tablet Take 1 tablet by mouth Every 8 (Eight) Hours.     • Diclofenac Sodium (VOLTAREN) 1 % gel gel      • fenofibrate 160 MG tablet Take 160 mg by mouth Daily.     • fenofibrate 160 MG tablet Take 1 tablet by mouth Daily.     • fluocinonide (LIDEX) 0.05 % ointment APPLY 1 APPLICATION TOPICALLY TO AFFECTED AREA TWICE A DAY FOR 2 WEEKS     • Fluticasone-Salmeterol (ADVAIR) 250-50 MCG/ACT DISKUS Inhale 1 puff Every 12 (Twelve) Hours.     • folic acid (FOLVITE) 1 MG tablet      • furosemide (Lasix) 20 MG tablet Take 1 tablet daily as needed for swelling 7 tablet 0   • HYDROcodone-acetaminophen (NORCO) 7.5-325 MG per tablet Take 1 tablet by mouth Daily.     • ibuprofen (ADVIL,MOTRIN) 200 MG tablet Take 1 tablet by mouth Every 6 (Six) Hours.     • insulin aspart (novoLOG FLEXPEN) 100 UNIT/ML solution pen-injector sc pen For use at mealtimes and per correctional scale, up to 30 units daily 9 mL 5   • insulin aspart prot & aspart (NovoLOG Mix 70/30 FlexPen) (70-30) 100 UNIT/ML suspension pen-injector injection Inject 12 Units under the skin into the appropriate area as directed.     • Insulin Glargine (BASAGLAR KWIKPEN) 100 UNIT/ML injection pen Inject 15 Units under the skin into the appropriate area as directed Daily. 6 mL 5   • ipratropium-albuterol (DUO-NEB) 0.5-2.5 mg/3 ml nebulizer USE 1  AMPULES IN NEBULIZER FOUR TIMES A DAY AS DIRECTED     • isosorbide dinitrate (ISORDIL) 30 MG tablet TAKE 1 TABLET BY MOUTH EVERY DAY FOR 90 DAYS     • isosorbide mononitrate (IMDUR) 30 MG 24 hr tablet Take 30 mg by mouth.     • LORazepam (ATIVAN) 1 MG tablet Take 1 mg by mouth Every 8 (Eight) Hours As Needed for Anxiety.     • losartan (COZAAR) 100 MG tablet      • losartan (COZAAR) 50 MG tablet Take 1 tablet by mouth Daily.     • meloxicam (MOBIC) 15 MG tablet TAKE 1 TABLET BY MOUTH EVERY DAY FOR 30 DAYS     • methocarbamol (ROBAXIN) 500 MG tablet Take 1,000 mg by mouth 2 (two) times a day. Patient takes 2 tablets 2 times a day.     • methocarbamol (ROBAXIN) 500 MG tablet Take 2 tablets by mouth Every 6 (Six) Hours.     • methotrexate 2.5 MG tablet      • metoprolol succinate XL (TOPROL-XL) 50 MG 24 hr tablet Take 1 tablet by mouth 2 (Two) Times a Day.     • metoprolol tartrate (LOPRESSOR) 50 MG tablet Take 1 tablet by mouth Every 12 (Twelve) Hours.     • nitrofurantoin (MACRODANTIN) 100 MG capsule Take 100 mg by mouth Every Night.     • nitroglycerin (NITROSTAT) 0.4 MG SL tablet 1 under the tongue as needed for angina, may repeat q5mins for up three doses (Patient taking differently: Place 0.4 mg under the tongue Every 5 (Five) Minutes As Needed (pt states has never taken this). 1 under the tongue as needed for angina, may repeat q5mins for up three doses) 100 tablet 11   • nystatin-triamcinolone (MYCOLOG) 990325-0.1 UNIT/GM-% ointment Apply 1 application topically to the appropriate area as directed 2 (Two) Times a Day. 60 g 0   • omeprazole OTC (PriLOSEC OTC) 20 MG EC tablet      • ondansetron (ZOFRAN) 4 MG tablet Take 4 mg by mouth Daily As Needed.     • potassium chloride 10 MEQ CR tablet Take 10 mEq by mouth Daily. with food     • rosuvastatin (CRESTOR) 10 MG tablet Take 10 mg by mouth Daily.     • SUMAtriptan (IMITREX) 50 MG tablet Take 50 mg by mouth As Needed.     • SUMAtriptan (IMITREX) 50 MG tablet Take  1 tablet by mouth.     • Trelegy Ellipta 100-62.5-25 MCG/INH inhaler Inhale 1 puff Daily. Rinse mouth out after use 3 each 3   • triamcinolone (KENALOG) 0.1 % cream Apply 1 application topically to the appropriate area as directed Daily.     • venlafaxine XR (EFFEXOR-XR) 150 MG 24 hr capsule TAKE 1 CAPSULE TWICE DAILY (Patient taking differently: Take 150 mg by mouth 2 (Two) Times a Day.) 180 capsule 3     No current facility-administered medications on file prior to visit.     Allergies   Allergen Reactions   • Ceftin [Cefuroxime Axetil] Diarrhea and Nausea Only     MODERATE DIARRHEA   • Quinapril Cough   • Codeine Rash   • Penicillins Rash     Past Surgical History:   Procedure Laterality Date   • ABDOMINAL HYSTERECTOMY  2007   • APPENDECTOMY     • COLONOSCOPY  2015?   • COLONOSCOPY N/A 2/17/2020    Procedure: COLONOSCOPY WITH COLD FORCEP POLYPECTOMY;  Surgeon: Belkis Peraza MD;  Location: [facility];  Service: Gastroenterology;  Laterality: N/A;   • COLONOSCOPY W/ POLYPECTOMY     • LAPAROSCOPIC CHOLECYSTECTOMY  2010   • MOUTH SURGERY      full extraction of teeth   • SALPINGO OOPHORECTOMY Bilateral 2009   • SKIN BIOPSY     • SPINAL FUSION  2002     Family History   Problem Relation Age of Onset   • Uterine cancer Mother         widely metastatic   • Breast cancer Mother    • Arthritis Mother    • Cancer Mother    • Heart attack Father    • Arthritis Father    • Hypertension Father    • Diabetes Other      Social History     Socioeconomic History   • Marital status:    Tobacco Use   • Smoking status: Former Smoker     Packs/day: 0.50     Years: 56.00     Pack years: 28.00     Types: Cigarettes   • Smokeless tobacco: Never Used   • Tobacco comment: smokes 1 cig a day    Vaping Use   • Vaping Use: Never used   Substance and Sexual Activity   • Alcohol use: Not Currently     Alcohol/week: 0.0 standard drinks     Comment: occa   • Drug use: No   • Sexual activity: Defer       Physical  "Examination:  Vital Signs: /82   Ht 162.6 cm (64\")   Wt 91.6 kg (202 lb)   BMI 34.67 kg/m²     General Appearance: alert, appears stated age, and cooperative  Breasts: Examined in supine position  Symmetric without masses or skin dimpling  Nipples normal without inversion, lesions or discharge  There are no palpable axillary nodes  +erythema with satellite lesions beneath bilateral breasts  Abdomen: no masses, no hepatomegaly, no splenomegaly, soft non-tender, no guarding, no rebound tenderness and Positive erythema and satellite lesions beneath the pannus  Pelvic: Clinical staff was present for exam  External genitalia:  Positive erythema of the vulva extending to the groin as well as multiple psoriatic lesions.  :  urethral meatus normal;  Vaginal:  atrophic mucosal changes are present;  Cervix:  absent.  Uterus:  absent.  Adnexa:  non palpable bilaterally.  Pap smear done and specimen sent using Thin-Prep technique    Data Review:  The following data was reviewed by: Ashwini Aguilar MD on 06/21/2022:     Labs:    Imaging:  Mammo Screening Digital Tomosynthesis Bilateral With CAD (08/01/2019 14:44)    Medical Records:  None    Assessment and Plan   Problem List Items Addressed This Visit    None     Visit Diagnoses     Encounter for gynecological examination with abnormal finding    -  Primary  I explained to Katharine that Pap smears are no longer recommended in patients after 65 years of age who have had adequate negative screening with three consecutive negative pap smears within the previous 10 years and the most recent test performed within the past 5 years. Women who have a history of ALESHA 2, ALESHA 3, or ACIS should continue routine screening for a total of 20 years after regression or treatment.   I stressed to Katharine that she still should be seen yearly for a full physical including breast and pelvic exam.  I informed her that women aged 65 and older still do get cervical cancer representing 14.1% of the [place] " female population and 19.6% of new cases of cervical cancer.  I also informed the patient regarding medicare guidelines on coverage for pap smear screening.  For this reason, Katharine has elected pap smear screening this year.    Relevant Orders    LIQUID-BASED PAP SMEAR, [facility] ([facility])    Encounter for screening mammogram for breast cancer      It is recommended per ACOG, for women at average risk to start annual mammogram screening at the age of 40 until the age of 75 and an individualized decision be made for women after age 75.  She was encouraged to continue getting yearly mammograms.  She should report any palpable breast lump(s) or skin changes regardless of mammographic findings.  I explained to Katharine that notification regarding her mammogram results will come from the center performing the study.  Our office will not be routinely calling with mammogram results.  It is her responsibility to make sure that the results from the mammogram are communicated to her by the breast center.  If she has any questions about the results, she is welcome to call our office anytime.  The patient reports she will schedule her mammogram.    Katharine was counseled regarding having clinical breast exams and breast self-awareness.  Women aged 29-39 years of age should have clinical breast exams every 1-3 years and yearly aged 40 and older.  The patient was counseled regarding breast self-awareness focusing on having a sense of what is normal for her breasts so that she can tell if there are changes.  Even small changes should be reported to provider.    Relevant Orders    Mammo Screening Digital Tomosynthesis Bilateral With CAD    Yeast dermatitis      Patient with yeast dermatitis as noted.  Prescription is given for Lotrisone.  Patient is also instructed to keep the area dry with Goldbond powder.  Instructions and precautions are given.  Patient is to call if no improvement in her symptoms as discussed.    Relevant Medications     fluocinonide (LIDEX) 0.05 % ointment    clotrimazole-betamethasone (Lotrisone) 1-0.05 % cream    Psoriasis      Patient with multiple psoriatic lesions as noted.  Patient is to keep her follow-up appointment with dermatology as well as new appointment with rheumatology as discussed.    Relevant Medications    fluocinonide (LIDEX) 0.05 % ointment    ibuprofen (ADVIL,MOTRIN) 200 MG tablet    meloxicam (MOBIC) 15 MG tablet    clotrimazole-betamethasone (Lotrisone) 1-0.05 % cream    Postmenopausal atrophic vaginitis      Discussed various options for relief of atrophic vaginal symptoms related to menopause. Discussed local therapy for treatment of vaginal symptoms only.  Discussed the different formulation options including cream, ring, and tablets.  Discussed the low risk of systemic absorption in postmenopausal women with atrophy using 25 mcgs of estradiol on a daily basis.  Recommend low dose use 2-3x/wk for maintenance of treatment.  Other treatment options were discussed including the use of water-based and silicone-based vaginal lubricants and moisturizers.  Also discussed was the FDA approved treatment option of ospemifene for moderate to severe dyspareunia.  Scription is given for estrogen cream as noted.  Patient is to call if no improvement in her symptoms in 4 to 6 weeks.    Relevant Medications    estradiol (ESTRACE VAGINAL) 0.1 MG/GM vaginal cream          Follow Up/Instructions:  Follow up as noted.  Patient was given instructions and counseling regarding her condition or for health maintenance advice. Please see specific information pulled into the AVS if appropriate.     Note: Speech recognition transcription software may have been used to dictate portions of this document.  An attempt at proofreading has been made though minor errors in transcription may still be present.    This note was electronically signed.  Ashwini Aguilar M.D.

## 2022-06-27 LAB — REF LAB TEST METHOD: NORMAL

## 2022-08-01 ENCOUNTER — HOSPITAL ENCOUNTER (OUTPATIENT)
Dept: MAMMOGRAPHY | Facility: HOSPITAL | Age: 77
End: 2022-08-01

## (undated) DEVICE — YANKAUER,BULB TIP, NO VENT: Brand: ARGYLE

## (undated) DEVICE — FRCP BIOP COLD ENDOJAW ALLGTR W/NDL 2.8X2300MM BLU

## (undated) DEVICE — GLV SURG SENSICARE W/ALOE PF LF 6.5 STRL

## (undated) DEVICE — MEDI-VAC NON-CONDUCTIVE SUCTION TUBING: Brand: CARDINAL HEALTH

## (undated) DEVICE — LUBE JELLY PK/2.75GM STRL BX/144

## (undated) DEVICE — Device

## (undated) DEVICE — GLV SURG SENSICARE W/ALOE PF LF SZ6 STRL

## (undated) DEVICE — Device: Brand: DEFENDO AIR/WATER/SUCTION AND BIOPSY VALVE